# Patient Record
Sex: MALE | Race: WHITE | NOT HISPANIC OR LATINO | Employment: FULL TIME | URBAN - METROPOLITAN AREA
[De-identification: names, ages, dates, MRNs, and addresses within clinical notes are randomized per-mention and may not be internally consistent; named-entity substitution may affect disease eponyms.]

---

## 2017-07-03 ENCOUNTER — OFFICE VISIT (OUTPATIENT)
Dept: FAMILY MEDICINE CLINIC | Facility: CLINIC | Age: 52
End: 2017-07-03

## 2017-07-03 VITALS
SYSTOLIC BLOOD PRESSURE: 128 MMHG | HEIGHT: 67 IN | WEIGHT: 238 LBS | OXYGEN SATURATION: 96 % | DIASTOLIC BLOOD PRESSURE: 76 MMHG | BODY MASS INDEX: 37.35 KG/M2 | TEMPERATURE: 98.2 F | HEART RATE: 87 BPM

## 2017-07-03 DIAGNOSIS — S76.112A STRAIN OF LEFT QUADRICEPS, INITIAL ENCOUNTER: Primary | ICD-10-CM

## 2017-07-03 PROCEDURE — 99213 OFFICE O/P EST LOW 20 MIN: CPT | Performed by: FAMILY MEDICINE

## 2017-07-03 RX ORDER — PANTOPRAZOLE SODIUM 40 MG/1
TABLET, DELAYED RELEASE ORAL
COMMUNITY
Start: 2017-04-04 | End: 2017-11-03 | Stop reason: SDUPTHER

## 2017-07-03 RX ORDER — CYCLOBENZAPRINE HCL 10 MG
10 TABLET ORAL 3 TIMES DAILY PRN
Qty: 30 TABLET | Refills: 0 | Status: SHIPPED | OUTPATIENT
Start: 2017-07-03 | End: 2017-11-03 | Stop reason: SDUPTHER

## 2017-07-03 NOTE — PROGRESS NOTES
Subjective   Jay Saunders is a 52 y.o. male    HPI Comments: Patient presents today for assessment of a left quadriceps injury that occurred on 6/28/17 while playing softball.  He felt something pop in his left anterior mid thigh diving for a softball.  He had subsequent loss of range of motion and slight swelling but marked pain and tenderness that persisted.  He subsequently developed evidence of deep bruising with discoloration in the medial distal thigh region.  He has pain with stretching of the quadriceps complex but has maintained relatively good motion overall.      The following portions of the patient's history were reviewed and updated as appropriate: allergies, current medications, past social history and problem list    Review of Systems   Constitutional: Negative.    Respiratory: Negative for shortness of breath and wheezing.    Cardiovascular: Negative for chest pain and palpitations.   Musculoskeletal: Positive for myalgias. Negative for arthralgias.   Skin: Positive for color change. Negative for rash and wound.   Neurological: Negative.    Hematological: Negative for adenopathy. Does not bruise/bleed easily.       Objective     Vitals:    07/03/17 1119   BP: 128/76   Pulse: 87   Temp: 98.2 °F (36.8 °C)   SpO2: 96%       Physical Exam   Constitutional: He is oriented to person, place, and time. He appears well-developed and well-nourished.   HENT:   Head: Normocephalic and atraumatic.   Eyes: Conjunctivae are normal. Pupils are equal, round, and reactive to light.   Musculoskeletal: He exhibits tenderness. He exhibits no deformity.        Left upper leg: He exhibits tenderness and swelling. He exhibits no bony tenderness.   Tenderness over the mid quadriceps left leg with resolving ecchymosis medial distal thigh.   Neurological: He is alert and oriented to person, place, and time.   Nursing note and vitals reviewed.      Assessment/Plan   Problem List Items Addressed This Visit     None      Visit  Diagnoses     Strain of left quadriceps, initial encounter    -  Primary    Relevant Medications    cyclobenzaprine (FLEXERIL) 10 MG tablet        Continued stretching left quad.

## 2017-10-28 RX ORDER — DICLOFENAC SODIUM 75 MG/1
TABLET, DELAYED RELEASE ORAL
Qty: 180 TABLET | Refills: 3 | Status: CANCELLED | OUTPATIENT
Start: 2017-10-28

## 2017-10-28 RX ORDER — DILTIAZEM HYDROCHLORIDE 240 MG/1
CAPSULE, EXTENDED RELEASE ORAL
Qty: 90 CAPSULE | Refills: 3 | Status: CANCELLED | OUTPATIENT
Start: 2017-10-28

## 2017-10-28 RX ORDER — SIMVASTATIN 40 MG
TABLET ORAL
Qty: 90 TABLET | Refills: 3 | Status: CANCELLED | OUTPATIENT
Start: 2017-10-28

## 2017-10-28 RX ORDER — CITALOPRAM 20 MG/1
TABLET ORAL
Qty: 90 TABLET | Refills: 3 | Status: CANCELLED | OUTPATIENT
Start: 2017-10-28

## 2017-10-28 RX ORDER — LISINOPRIL AND HYDROCHLOROTHIAZIDE 20; 12.5 MG/1; MG/1
TABLET ORAL
Qty: 90 TABLET | Refills: 3 | Status: CANCELLED | OUTPATIENT
Start: 2017-10-28

## 2017-11-02 RX ORDER — CITALOPRAM 20 MG/1
TABLET ORAL
Qty: 90 TABLET | Refills: 3 | Status: CANCELLED | OUTPATIENT
Start: 2017-11-02

## 2017-11-03 ENCOUNTER — TELEPHONE (OUTPATIENT)
Dept: FAMILY MEDICINE CLINIC | Facility: CLINIC | Age: 52
End: 2017-11-03

## 2017-11-03 DIAGNOSIS — S76.112A STRAIN OF LEFT QUADRICEPS, INITIAL ENCOUNTER: ICD-10-CM

## 2017-11-03 RX ORDER — SIMVASTATIN 40 MG
40 TABLET ORAL DAILY
Qty: 90 TABLET | Refills: 3 | Status: SHIPPED | OUTPATIENT
Start: 2017-11-03 | End: 2018-11-06 | Stop reason: SDUPTHER

## 2017-11-03 RX ORDER — PANTOPRAZOLE SODIUM 40 MG/1
40 TABLET, DELAYED RELEASE ORAL DAILY
Qty: 30 TABLET | Refills: 1 | Status: SHIPPED | OUTPATIENT
Start: 2017-11-03 | End: 2018-11-26

## 2017-11-03 RX ORDER — DILTIAZEM HYDROCHLORIDE 240 MG/1
240 CAPSULE, EXTENDED RELEASE ORAL DAILY
Qty: 90 CAPSULE | Refills: 3 | Status: SHIPPED | OUTPATIENT
Start: 2017-11-03 | End: 2018-11-06 | Stop reason: SDUPTHER

## 2017-11-03 RX ORDER — CITALOPRAM 20 MG/1
20 TABLET ORAL DAILY
Qty: 90 TABLET | Refills: 3 | Status: SHIPPED | OUTPATIENT
Start: 2017-11-03 | End: 2018-11-06 | Stop reason: SDUPTHER

## 2017-11-03 RX ORDER — LISINOPRIL AND HYDROCHLOROTHIAZIDE 20; 12.5 MG/1; MG/1
1 TABLET ORAL DAILY
Qty: 90 TABLET | Refills: 3 | Status: SHIPPED | OUTPATIENT
Start: 2017-11-03 | End: 2018-11-06 | Stop reason: SDUPTHER

## 2017-11-03 RX ORDER — DICLOFENAC SODIUM 75 MG/1
75 TABLET, DELAYED RELEASE ORAL 2 TIMES DAILY
Qty: 180 TABLET | Refills: 3 | Status: SHIPPED | OUTPATIENT
Start: 2017-11-03 | End: 2018-11-06 | Stop reason: SDUPTHER

## 2017-11-03 RX ORDER — CYCLOBENZAPRINE HCL 10 MG
10 TABLET ORAL 3 TIMES DAILY PRN
Qty: 30 TABLET | Refills: 0 | Status: SHIPPED | OUTPATIENT
Start: 2017-11-03 | End: 2018-11-26

## 2017-11-03 NOTE — TELEPHONE ENCOUNTER
Patient called for refills of medication, spoke to Dr. ST  Verbally and he ok'd all refills, notified patient. Sent to [facility]. Patient has appt 11/22/2017

## 2017-11-03 NOTE — TELEPHONE ENCOUNTER
----- Message from Brisa Umana sent at 11/3/2017 10:32 AM EDT -----  Contact: RX  PLEASE CALL RX BACK, THEY NEED TO VERIFY THAT THE INTERACTION BETWEEN THE FOLLOWING MEDICATIONS ARE BEING MONITORED:    diltiaZEM (TAZTIA XT) 240 MG 24 hr capsule  simvastatin (ZOCOR) 40 MG tablet        Pharmacy     [address] - [phone]  - [phone] FX

## 2017-11-22 ENCOUNTER — OFFICE VISIT (OUTPATIENT)
Dept: FAMILY MEDICINE CLINIC | Facility: CLINIC | Age: 52
End: 2017-11-22

## 2017-11-22 VITALS
BODY MASS INDEX: 36.29 KG/M2 | OXYGEN SATURATION: 99 % | HEIGHT: 67 IN | DIASTOLIC BLOOD PRESSURE: 74 MMHG | SYSTOLIC BLOOD PRESSURE: 128 MMHG | WEIGHT: 231.2 LBS | HEART RATE: 67 BPM

## 2017-11-22 DIAGNOSIS — I10 ESSENTIAL HYPERTENSION: ICD-10-CM

## 2017-11-22 DIAGNOSIS — Z12.5 PROSTATE CANCER SCREENING: ICD-10-CM

## 2017-11-22 DIAGNOSIS — M15.9 PRIMARY OSTEOARTHRITIS INVOLVING MULTIPLE JOINTS: ICD-10-CM

## 2017-11-22 DIAGNOSIS — E78.49 OTHER HYPERLIPIDEMIA: ICD-10-CM

## 2017-11-22 DIAGNOSIS — Z00.00 ROUTINE GENERAL MEDICAL EXAMINATION AT A HEALTH CARE FACILITY: Primary | ICD-10-CM

## 2017-11-22 PROCEDURE — 93000 ELECTROCARDIOGRAM COMPLETE: CPT | Performed by: FAMILY MEDICINE

## 2017-11-22 PROCEDURE — 99396 PREV VISIT EST AGE 40-64: CPT | Performed by: FAMILY MEDICINE

## 2017-11-22 RX ORDER — DILTIAZEM HYDROCHLORIDE 240 MG/1
CAPSULE, COATED, EXTENDED RELEASE ORAL
COMMUNITY
Start: 2017-11-08 | End: 2018-11-26

## 2017-11-22 NOTE — PROGRESS NOTES
Subjective   Jay Saunders is a 52 y.o. male    HPI Comments: Patient presents today for annual physical examination and follow-up regarding hypertension and hyperlipidemia.  He is compliant with his medications.  He reports some difficulties with erectile dysfunction and thinks it may be due to his citalopram.  I've given him instructions on how to wean off his medication of citalopram slowly over the next month.  Hopefully this will help.  The patient is not fasting today but will get his labs done at the [facility] where he lives.  He has no acute complaints today and feels well generally.  He is frustrated with his weight but has started exercising regularly at a gym 2-3 days a week and has lost a few pounds already.  He is encouraged regarding this.    Hypertension   Pertinent negatives include no chest pain, headaches, palpitations or shortness of breath.   Hyperlipidemia   Pertinent negatives include no chest pain, myalgias or shortness of breath.       The following portions of the patient's history were reviewed and updated as appropriate: allergies, current medications, past social history and problem list    Review of Systems   Constitutional: Negative.  Negative for chills, fatigue, fever and unexpected weight change.   HENT: Negative.    Eyes: Negative.    Respiratory: Negative.  Negative for cough, chest tightness, shortness of breath and wheezing.    Cardiovascular: Negative.  Negative for chest pain, palpitations and leg swelling.   Gastrointestinal: Negative.  Negative for abdominal pain, nausea and vomiting.   Endocrine: Negative.  Negative for polydipsia, polyphagia and polyuria.   Genitourinary: Negative.  Negative for dysuria, frequency and urgency.   Musculoskeletal: Negative.  Negative for arthralgias, back pain and myalgias.   Skin: Negative.  Negative for color change and rash.   Allergic/Immunologic: Negative.    Neurological: Negative.  Negative for dizziness, syncope, weakness  and headaches.   Hematological: Negative.  Negative for adenopathy. Does not bruise/bleed easily.   Psychiatric/Behavioral: Negative.    All other systems reviewed and are negative.      Objective     Vitals:    11/22/17 1406   BP: 128/74   Pulse: 67   SpO2: 99%       Physical Exam   Constitutional: He is oriented to person, place, and time. He appears well-developed and well-nourished.   HENT:   Head: Normocephalic and atraumatic.   Right Ear: External ear normal.   Left Ear: External ear normal.   Nose: Nose normal.   Mouth/Throat: Oropharynx is clear and moist.   Eyes: Conjunctivae and EOM are normal. Pupils are equal, round, and reactive to light.   Neck: Normal range of motion. Neck supple. No JVD present. No thyromegaly present.   Cardiovascular: Normal rate, regular rhythm, normal heart sounds, intact distal pulses and normal pulses.    No murmur heard.  Pulmonary/Chest: Effort normal and breath sounds normal. No respiratory distress.   Abdominal: Soft. Bowel sounds are normal. He exhibits no mass. There is no hepatosplenomegaly. There is no tenderness.   Genitourinary: Rectum normal, prostate normal and penis normal.   Musculoskeletal: Normal range of motion. He exhibits no edema.   Lymphadenopathy:     He has no cervical adenopathy.   Neurological: He is alert and oriented to person, place, and time. He has normal reflexes. No cranial nerve deficit.   Skin: Skin is warm and dry. No rash noted.   Psychiatric: He has a normal mood and affect. His behavior is normal.   Nursing note and vitals reviewed.      ECG 12 Lead  Date/Time: 11/22/2017 4:50 PM  Performed by: RAMESH MEJIA  Authorized by: RAMESH MEJIA   Comparison: compared with previous ECG from 3/17/2015  Similar to previous ECG  Rhythm: sinus rhythm  Rate: normal  Conduction: conduction normal  ST Segments: ST segments normal  T Waves: T waves normal  QRS axis: normal  Other: no other findings  Clinical impression: normal  ECG  Comments: rSr' in V1 likely normal variant.          Assessment/Plan   Problem List Items Addressed This Visit        Cardiovascular and Mediastinum    Hyperlipidemia    Relevant Orders    Comprehensive Metabolic Panel    Lipid Panel    TSH    ECG 12 Lead      Other Visit Diagnoses     Routine general medical examination at a health care facility    -  Primary    Relevant Orders    CBC (No Diff)    Comprehensive Metabolic Panel    Essential hypertension        Relevant Medications    diltiaZEM CD (CARDIZEM CD) 240 MG 24 hr capsule    Other Relevant Orders    Comprehensive Metabolic Panel    TSH    ECG 12 Lead    Primary osteoarthritis involving multiple joints        Prostate cancer screening        Relevant Orders    PSA Screen        Patient is counseled during today's visit regarding preventative health measures to include use of seatbelts, medication safety, healthy diet and regular exercise.

## 2018-11-06 RX ORDER — LISINOPRIL AND HYDROCHLOROTHIAZIDE 20; 12.5 MG/1; MG/1
TABLET ORAL
Qty: 90 TABLET | Refills: 0 | Status: SHIPPED | OUTPATIENT
Start: 2018-11-06 | End: 2018-11-26

## 2018-11-06 RX ORDER — SIMVASTATIN 40 MG
TABLET ORAL
Qty: 90 TABLET | Refills: 0 | Status: SHIPPED | OUTPATIENT
Start: 2018-11-06 | End: 2018-11-26

## 2018-11-06 RX ORDER — DICLOFENAC SODIUM 75 MG/1
TABLET, DELAYED RELEASE ORAL
Qty: 180 TABLET | Refills: 0 | Status: SHIPPED | OUTPATIENT
Start: 2018-11-06 | End: 2018-11-26

## 2018-11-06 RX ORDER — DILTIAZEM HYDROCHLORIDE 240 MG/1
CAPSULE, EXTENDED RELEASE ORAL
Qty: 90 CAPSULE | Refills: 0 | Status: SHIPPED | OUTPATIENT
Start: 2018-11-06 | End: 2018-11-26

## 2018-11-06 RX ORDER — CITALOPRAM 20 MG/1
TABLET ORAL
Qty: 90 TABLET | Refills: 0 | Status: SHIPPED | OUTPATIENT
Start: 2018-11-06 | End: 2018-11-26

## 2018-11-26 ENCOUNTER — OFFICE VISIT (OUTPATIENT)
Dept: FAMILY MEDICINE CLINIC | Facility: CLINIC | Age: 53
End: 2018-11-26

## 2018-11-26 VITALS
DIASTOLIC BLOOD PRESSURE: 78 MMHG | HEART RATE: 60 BPM | OXYGEN SATURATION: 98 % | TEMPERATURE: 98.6 F | BODY MASS INDEX: 34.07 KG/M2 | WEIGHT: 238 LBS | SYSTOLIC BLOOD PRESSURE: 130 MMHG | HEIGHT: 70 IN

## 2018-11-26 DIAGNOSIS — Z00.00 ROUTINE GENERAL MEDICAL EXAMINATION AT A HEALTH CARE FACILITY: Primary | ICD-10-CM

## 2018-11-26 DIAGNOSIS — M25.532 LEFT WRIST PAIN: ICD-10-CM

## 2018-11-26 DIAGNOSIS — I10 ESSENTIAL HYPERTENSION: ICD-10-CM

## 2018-11-26 DIAGNOSIS — S76.112A STRAIN OF LEFT QUADRICEPS, INITIAL ENCOUNTER: ICD-10-CM

## 2018-11-26 DIAGNOSIS — E78.49 OTHER HYPERLIPIDEMIA: ICD-10-CM

## 2018-11-26 DIAGNOSIS — Z12.5 PROSTATE CANCER SCREENING: ICD-10-CM

## 2018-11-26 DIAGNOSIS — M15.9 PRIMARY OSTEOARTHRITIS INVOLVING MULTIPLE JOINTS: ICD-10-CM

## 2018-11-26 DIAGNOSIS — R53.83 FATIGUE, UNSPECIFIED TYPE: ICD-10-CM

## 2018-11-26 PROCEDURE — 99396 PREV VISIT EST AGE 40-64: CPT | Performed by: FAMILY MEDICINE

## 2018-11-26 RX ORDER — LISINOPRIL AND HYDROCHLOROTHIAZIDE 20; 12.5 MG/1; MG/1
1 TABLET ORAL DAILY
Qty: 90 TABLET | Refills: 3 | Status: SHIPPED | OUTPATIENT
Start: 2018-11-26 | End: 2020-01-08 | Stop reason: SDUPTHER

## 2018-11-26 RX ORDER — DICLOFENAC SODIUM 75 MG/1
75 TABLET, DELAYED RELEASE ORAL 2 TIMES DAILY
Qty: 180 TABLET | Refills: 3 | Status: SHIPPED | OUTPATIENT
Start: 2018-11-26 | End: 2020-01-08 | Stop reason: SDUPTHER

## 2018-11-26 RX ORDER — DILTIAZEM HYDROCHLORIDE 240 MG/1
240 CAPSULE, COATED, EXTENDED RELEASE ORAL DAILY
Qty: 90 CAPSULE | Refills: 3 | Status: SHIPPED | OUTPATIENT
Start: 2018-11-26 | End: 2020-01-08 | Stop reason: SDUPTHER

## 2018-11-26 RX ORDER — PANTOPRAZOLE SODIUM 40 MG/1
40 TABLET, DELAYED RELEASE ORAL DAILY
Qty: 90 TABLET | Refills: 3 | Status: SHIPPED | OUTPATIENT
Start: 2018-11-26 | End: 2020-01-08 | Stop reason: SDUPTHER

## 2018-11-26 RX ORDER — CITALOPRAM 20 MG/1
20 TABLET ORAL DAILY
Qty: 90 TABLET | Refills: 3 | Status: SHIPPED | OUTPATIENT
Start: 2018-11-26 | End: 2020-01-08 | Stop reason: SDUPTHER

## 2018-11-26 RX ORDER — CYCLOBENZAPRINE HCL 10 MG
10 TABLET ORAL 3 TIMES DAILY PRN
Qty: 30 TABLET | Refills: 5 | Status: SHIPPED | OUTPATIENT
Start: 2018-11-26 | End: 2020-01-08 | Stop reason: SDUPTHER

## 2018-11-26 RX ORDER — SIMVASTATIN 40 MG
40 TABLET ORAL DAILY
Qty: 90 TABLET | Refills: 3 | Status: SHIPPED | OUTPATIENT
Start: 2018-11-26 | End: 2020-01-08 | Stop reason: SDUPTHER

## 2018-11-26 NOTE — PROGRESS NOTES
Subjective   Jay Saunders is a 53 y.o. male    Chief Complaint    Annual physical exam  High cholesterol  High blood pressure  GERD  Left wrist pain      History of Present Illness   Patient presents today for annual physical examination.  He is also here to follow-up regarding multiple chronic medical problems including hypertension, hyperlipidemia, GERD, degenerative arthritis with chronic back pain.  He is complaining of left wrist pain that has been present for some time but seems to have gotten worse of late.  He thinks he may have had a remote injury involving his wrist but had not had pain for many years.  He reports pain with hyperextension or hyperflexion.  It is limiting his activities as he is remodeling a home.  Patient requests written order for his lab studies to be done at [facility].  He is due for refills on all of his medications.      The following portions of the patient's history were reviewed and updated as appropriate: allergies, current medications, past social history and problem list    Review of Systems   Constitutional: Negative.  Negative for appetite change, chills, fatigue, fever and unexpected weight change.   HENT: Negative.    Eyes: Negative.    Respiratory: Negative.  Negative for cough, chest tightness, shortness of breath and wheezing.    Cardiovascular: Negative.  Negative for chest pain, palpitations and leg swelling.   Gastrointestinal: Negative.  Negative for abdominal distention, abdominal pain, diarrhea, nausea and vomiting.        Patient experiencing heartburn/acid reflux     Endocrine: Negative.  Negative for polydipsia, polyphagia and polyuria.   Genitourinary: Negative.  Negative for dysuria, frequency and urgency.   Musculoskeletal: Negative.  Negative for arthralgias, back pain and myalgias.   Skin: Negative.  Negative for color change and rash.   Allergic/Immunologic: Negative.    Neurological: Negative.  Negative for dizziness, syncope, weakness and  headaches.   Hematological: Negative.  Negative for adenopathy. Does not bruise/bleed easily.   Psychiatric/Behavioral: Negative.    All other systems reviewed and are negative.      Objective     Vitals:    11/26/18 1407   BP: 130/78   Pulse: 60   Temp: 98.6 °F (37 °C)   SpO2: 98%       Physical Exam   Constitutional: He is oriented to person, place, and time. He appears well-developed and well-nourished.   HENT:   Head: Normocephalic and atraumatic.   Right Ear: External ear normal.   Left Ear: External ear normal.   Nose: Nose normal.   Mouth/Throat: Oropharynx is clear and moist.   Eyes: Conjunctivae and EOM are normal. Pupils are equal, round, and reactive to light.   Neck: Normal range of motion. Neck supple. No JVD present. No thyromegaly present.   Cardiovascular: Normal rate, regular rhythm, normal heart sounds, intact distal pulses and normal pulses.   No murmur heard.  Pulmonary/Chest: Effort normal and breath sounds normal. No respiratory distress.   Abdominal: Soft. Bowel sounds are normal. He exhibits no mass. There is no hepatosplenomegaly. There is no tenderness.   Genitourinary: Rectum normal, prostate normal and penis normal.   Musculoskeletal: Normal range of motion. He exhibits no edema.   Lymphadenopathy:     He has no cervical adenopathy.   Neurological: He is alert and oriented to person, place, and time. He has normal reflexes. No cranial nerve deficit.   Skin: Skin is warm and dry. No rash noted.   Psychiatric: He has a normal mood and affect. His behavior is normal.   Nursing note and vitals reviewed.      Assessment/Plan   Problem List Items Addressed This Visit        Cardiovascular and Mediastinum    Hyperlipidemia    Relevant Medications    simvastatin (ZOCOR) 40 MG tablet    Other Relevant Orders    Comprehensive Metabolic Panel    Lipid Panel      Other Visit Diagnoses     Routine general medical examination at a health care facility    -  Primary    Relevant Orders    CBC (No Diff)     Comprehensive Metabolic Panel    Lipid Panel    Prostate cancer screening        Relevant Orders    PSA Screen    Primary osteoarthritis involving multiple joints        Essential hypertension        Relevant Medications    diltiaZEM CD (CARTIA XT) 240 MG 24 hr capsule    lisinopril-hydrochlorothiazide (PRINZIDE,ZESTORETIC) 20-12.5 MG per tablet    Other Relevant Orders    CBC (No Diff)    Comprehensive Metabolic Panel    Lipid Panel    TSH    T4, Free    Fatigue, unspecified type        Relevant Orders    Comprehensive Metabolic Panel    TSH    T4, Free    Testosterone, Free, Total    Left wrist pain        Relevant Orders    Ambulatory Referral to Hand Surgery    Strain of left quadriceps, initial encounter        Relevant Medications    cyclobenzaprine (FLEXERIL) 10 MG tablet        Patient is counseled during today's visit regarding preventative health measures to include use of seatbelts, medication safety, healthy diet and regular exercise.

## 2019-01-18 ENCOUNTER — TELEPHONE (OUTPATIENT)
Dept: FAMILY MEDICINE CLINIC | Facility: CLINIC | Age: 54
End: 2019-01-18

## 2019-01-18 NOTE — TELEPHONE ENCOUNTER
He had them drawn at [facility].  Everything looks okay but I never did receive his PSA or TSH levels.  Okay to mail copies of what we've got

## 2019-01-18 NOTE — TELEPHONE ENCOUNTER
----- Message from Brisa Umana sent at 1/18/2019 10:25 AM EST -----  Contact: PATIENT  HE WOULD LIKE A COPY OF HIS LABS MAILED TO HIM, SAID ITS BEEN SEVERAL MONTHS AND HE HAS NOT GOTTEN ANYTHING FROM  THE OFFICE. THANK YOU

## 2020-01-08 ENCOUNTER — OFFICE VISIT (OUTPATIENT)
Dept: FAMILY MEDICINE CLINIC | Facility: CLINIC | Age: 55
End: 2020-01-08

## 2020-01-08 VITALS
WEIGHT: 247 LBS | HEART RATE: 69 BPM | DIASTOLIC BLOOD PRESSURE: 84 MMHG | HEIGHT: 70 IN | OXYGEN SATURATION: 98 % | TEMPERATURE: 97.6 F | SYSTOLIC BLOOD PRESSURE: 140 MMHG | BODY MASS INDEX: 35.36 KG/M2

## 2020-01-08 DIAGNOSIS — E78.49 OTHER HYPERLIPIDEMIA: ICD-10-CM

## 2020-01-08 DIAGNOSIS — F41.9 ANXIETY: ICD-10-CM

## 2020-01-08 DIAGNOSIS — I10 ESSENTIAL HYPERTENSION: ICD-10-CM

## 2020-01-08 DIAGNOSIS — Z00.00 ROUTINE GENERAL MEDICAL EXAMINATION AT A HEALTH CARE FACILITY: Primary | ICD-10-CM

## 2020-01-08 DIAGNOSIS — Z12.5 PROSTATE CANCER SCREENING: ICD-10-CM

## 2020-01-08 DIAGNOSIS — K21.9 GASTROESOPHAGEAL REFLUX DISEASE, ESOPHAGITIS PRESENCE NOT SPECIFIED: ICD-10-CM

## 2020-01-08 PROCEDURE — 99396 PREV VISIT EST AGE 40-64: CPT | Performed by: FAMILY MEDICINE

## 2020-01-08 RX ORDER — SIMVASTATIN 40 MG
40 TABLET ORAL DAILY
Qty: 90 TABLET | Refills: 3 | Status: SHIPPED | OUTPATIENT
Start: 2020-01-08 | End: 2021-02-11

## 2020-01-08 RX ORDER — PANTOPRAZOLE SODIUM 40 MG/1
40 TABLET, DELAYED RELEASE ORAL DAILY
Qty: 90 TABLET | Refills: 3 | Status: SHIPPED | OUTPATIENT
Start: 2020-01-08 | End: 2021-02-11

## 2020-01-08 RX ORDER — DILTIAZEM HYDROCHLORIDE 240 MG/1
240 CAPSULE, COATED, EXTENDED RELEASE ORAL DAILY
Qty: 90 CAPSULE | Refills: 3 | Status: SHIPPED | OUTPATIENT
Start: 2020-01-08 | End: 2021-02-11

## 2020-01-08 RX ORDER — DICLOFENAC SODIUM 75 MG/1
75 TABLET, DELAYED RELEASE ORAL 2 TIMES DAILY
Qty: 180 TABLET | Refills: 3 | Status: SHIPPED | OUTPATIENT
Start: 2020-01-08 | End: 2021-02-11

## 2020-01-08 RX ORDER — LISINOPRIL AND HYDROCHLOROTHIAZIDE 20; 12.5 MG/1; MG/1
1 TABLET ORAL DAILY
Qty: 90 TABLET | Refills: 3 | Status: SHIPPED | OUTPATIENT
Start: 2020-01-08 | End: 2021-02-11

## 2020-01-08 RX ORDER — CYCLOBENZAPRINE HCL 10 MG
10 TABLET ORAL 3 TIMES DAILY PRN
Qty: 30 TABLET | Refills: 5 | Status: SHIPPED | OUTPATIENT
Start: 2020-01-08 | End: 2020-07-08

## 2020-01-08 RX ORDER — CITALOPRAM 20 MG/1
20 TABLET ORAL DAILY
Qty: 90 TABLET | Refills: 3 | Status: SHIPPED | OUTPATIENT
Start: 2020-01-08 | End: 2021-02-11

## 2020-01-08 NOTE — PROGRESS NOTES
Subjective   Jay Saunders is a 54 y.o. male    Chief Complaint    Annual physical exam  Prostate cancer screening  High blood pressure  High cholesterol  Anxiety    History of Present Illness  Patient presents today for annual physical examination.  He is due for annual lab studies as well as renewal of his prescriptions.  Since I last saw him he had surgery on his wrist by Dr. Olson.  He reports this is been slow to heal but he has improved.  Health maintenance issues are reviewed and addressed with the patient.    The following portions of the patient's history were reviewed and updated as appropriate: allergies, current medications, past social history and problem list    Review of Systems   Constitutional: Negative.  Negative for chills, fatigue, fever and unexpected weight change.   HENT: Negative.    Eyes: Negative.    Respiratory: Negative.  Negative for cough, chest tightness, shortness of breath and wheezing.    Cardiovascular: Negative.  Negative for chest pain, palpitations and leg swelling.   Gastrointestinal: Negative.  Negative for abdominal pain, nausea and vomiting.   Endocrine: Negative.  Negative for polydipsia, polyphagia and polyuria.   Genitourinary: Negative.  Negative for dysuria, frequency and urgency.   Musculoskeletal: Negative.  Negative for arthralgias, back pain and myalgias.   Skin: Negative.  Negative for color change and rash.   Allergic/Immunologic: Negative.    Neurological: Negative.  Negative for dizziness, syncope, weakness and headaches.   Hematological: Negative.  Negative for adenopathy. Does not bruise/bleed easily.   Psychiatric/Behavioral: Negative.    All other systems reviewed and are negative.      Objective     Vitals:    01/08/20 1407   BP: 140/84   Pulse: 69   Temp: 97.6 °F (36.4 °C)   SpO2: 98%       Physical Exam   Constitutional: He is oriented to person, place, and time. He appears well-developed and well-nourished.   HENT:   Head: Normocephalic and  atraumatic.   Right Ear: External ear normal.   Left Ear: External ear normal.   Nose: Nose normal.   Mouth/Throat: Oropharynx is clear and moist.   Eyes: Pupils are equal, round, and reactive to light. Conjunctivae and EOM are normal.   Neck: Normal range of motion. Neck supple. No JVD present. No thyromegaly present.   Cardiovascular: Normal rate, regular rhythm, normal heart sounds, intact distal pulses and normal pulses.   No murmur heard.  Pulmonary/Chest: Effort normal and breath sounds normal. No respiratory distress.   Abdominal: Soft. Bowel sounds are normal. He exhibits no mass. There is no hepatosplenomegaly. There is no tenderness.   Genitourinary: Rectum normal, prostate normal and penis normal.   Musculoskeletal: Normal range of motion. He exhibits no edema.   Lymphadenopathy:     He has no cervical adenopathy.   Neurological: He is alert and oriented to person, place, and time. He has normal reflexes. No cranial nerve deficit.   Skin: Skin is warm and dry. No rash noted.   Psychiatric: He has a normal mood and affect. His behavior is normal.   Nursing note and vitals reviewed.      Assessment/Plan   Problem List Items Addressed This Visit        Cardiovascular and Mediastinum    Hyperlipidemia    Relevant Medications    simvastatin (ZOCOR) 40 MG tablet    Other Relevant Orders    Comprehensive Metabolic Panel    Lipid Panel      Other Visit Diagnoses     Routine general medical examination at a health care facility    -  Primary    Relevant Orders    CBC (No Diff)    Comprehensive Metabolic Panel    Lipid Panel    PSA Screen    TSH    T4, Free    Prostate cancer screening        Relevant Orders    PSA Screen    Essential hypertension        Relevant Medications    dilTIAZem CD (CARTIA XT) 240 MG 24 hr capsule    lisinopril-hydrochlorothiazide (PRINZIDE,ZESTORETIC) 20-12.5 MG per tablet    Other Relevant Orders    CBC (No Diff)    Comprehensive Metabolic Panel    TSH    T4, Free    Strain of left  quadriceps, initial encounter        Relevant Medications    cyclobenzaprine (FLEXERIL) 10 MG tablet        Patient is counseled during today's visit regarding preventative health measures to include use of seatbelts, medication safety, healthy diet and regular exercise.

## 2020-07-08 ENCOUNTER — OFFICE VISIT (OUTPATIENT)
Dept: FAMILY MEDICINE CLINIC | Facility: CLINIC | Age: 55
End: 2020-07-08

## 2020-07-08 VITALS
DIASTOLIC BLOOD PRESSURE: 82 MMHG | WEIGHT: 245 LBS | TEMPERATURE: 97.7 F | SYSTOLIC BLOOD PRESSURE: 124 MMHG | HEIGHT: 70 IN | BODY MASS INDEX: 35.07 KG/M2 | OXYGEN SATURATION: 99 % | HEART RATE: 78 BPM

## 2020-07-08 DIAGNOSIS — M54.42 ACUTE MIDLINE LOW BACK PAIN WITH BILATERAL SCIATICA: Primary | ICD-10-CM

## 2020-07-08 DIAGNOSIS — M54.41 ACUTE MIDLINE LOW BACK PAIN WITH BILATERAL SCIATICA: Primary | ICD-10-CM

## 2020-07-08 DIAGNOSIS — M51.36 DEGENERATIVE DISC DISEASE, LUMBAR: ICD-10-CM

## 2020-07-08 PROCEDURE — 99213 OFFICE O/P EST LOW 20 MIN: CPT | Performed by: PHYSICIAN ASSISTANT

## 2020-07-08 RX ORDER — TIZANIDINE 2 MG/1
2 TABLET ORAL EVERY 8 HOURS PRN
Qty: 30 TABLET | Refills: 1 | Status: SHIPPED | OUTPATIENT
Start: 2020-07-08

## 2020-07-08 RX ORDER — METHYLPREDNISOLONE 4 MG/1
TABLET ORAL
Qty: 1 EACH | Refills: 0 | Status: SHIPPED | OUTPATIENT
Start: 2020-07-08 | End: 2020-10-20 | Stop reason: SDUPTHER

## 2020-07-08 NOTE — PROGRESS NOTES
Subjective   Jay Saunders is a 55 y.o. male  Back Pain (increased lower back pain x2 weeks, difficulty ambulating )      History of Present Illness  Patient is a pleasant 55-year-old white male who presents for evaluation treatment of severe, unrelenting, constant back pain he has been experiencing for the past 5 days.  He has a history of degenerative disc disease status post spinal surgery 2015 with Dr. Owens.  States he is done really well for the past 5 years and rarely has any flareups of his back problem until this past weekend.  He states he was bending over pouring some concrete and felt a sharp pain in his low back.  States is progressively worsened and is now 10 out of 10 and he has not been able get out of the bed for the past 3 days to do daily activities.  He states that it is radiating down both of his legs.  He is sitting down the pain radiates to his knees if he is walking radiates to his feet.  He states he had to have his wife assist him to dress himself, shower and he has difficulty walking.  He complains of pain numbness burning tingling in low back down both legs.  No loss of urinary or bowel control.  He is currently taking diclofenac and Flexeril.  The following portions of the patient's history were reviewed and updated as appropriate: allergies, current medications, past social history and problem list    Review of Systems   Respiratory: Negative.    Gastrointestinal: Negative.    Genitourinary: Negative.    Musculoskeletal: Positive for back pain and myalgias. Negative for arthralgias and gait problem.   Skin: Negative.    Neurological: Positive for weakness and numbness. Negative for dizziness and tremors.   Psychiatric/Behavioral: Positive for sleep disturbance.       Objective     Vitals:    07/08/20 1601   BP: 124/82   Pulse: 78   Temp: 97.7 °F (36.5 °C)   SpO2: 99%       Physical Exam   Constitutional: He is oriented to person, place, and time. He appears well-developed and  well-nourished. He does not have a sickly appearance. He does not appear ill. He appears distressed.   Cardiovascular: Normal rate, regular rhythm and intact distal pulses.   Pulmonary/Chest: Effort normal and breath sounds normal. No respiratory distress.   Musculoskeletal: He exhibits tenderness.        Lumbar back: He exhibits decreased range of motion, tenderness, bony tenderness and pain. He exhibits no swelling, no deformity and no spasm.   Gait is very slow, struggles to stand up after being seated, walks with lumbar spine in flexion and with very slow movements   Neurological: He is alert and oriented to person, place, and time. He displays abnormal reflex. A sensory deficit is present. No cranial nerve deficit. He exhibits normal muscle tone. Coordination normal.   Skin: No rash noted. He is not diaphoretic. No erythema. No pallor.   Psychiatric: He has a normal mood and affect. His behavior is normal. Thought content normal.   Nursing note and vitals reviewed.    I reviewed patient's MRI to start from 2015 showing degenerative changes  Assessment/Plan     Jay was seen today for back pain.    Diagnoses and all orders for this visit:    Acute midline low back pain with bilateral sciatica    Degenerative disc disease, lumbar    Other orders  -     methylPREDNISolone (MEDROL, LORIE,) 4 MG tablet; Take as directed on package instructions.  -     tiZANidine (ZANAFLEX) 2 MG tablet; Take 1 tablet by mouth Every 8 (Eight) Hours As Needed for Muscle Spasms.    Continuing diclofenac.  Prescription given for gabapentin 3 mg 1 twice daily for 10 days #20 and Norco 10/325 1 twice daily #20 with no refills.  Discussed abuse potential, controlled substance status of these medications and intention of this being for short-term 10-day usage only.  If symptoms are persistent, unimproved after the weekend patient will call at that time would recommend MRI of lumbar spine for further evaluation due to previous surgery and  symptoms of severe radiculopathy

## 2020-07-13 ENCOUNTER — TELEPHONE (OUTPATIENT)
Dept: FAMILY MEDICINE CLINIC | Facility: CLINIC | Age: 55
End: 2020-07-13

## 2020-07-13 DIAGNOSIS — M51.36 DEGENERATIVE DISC DISEASE, LUMBAR: Primary | ICD-10-CM

## 2020-07-13 DIAGNOSIS — M54.41 ACUTE MIDLINE LOW BACK PAIN WITH BILATERAL SCIATICA: ICD-10-CM

## 2020-07-13 DIAGNOSIS — M54.42 ACUTE MIDLINE LOW BACK PAIN WITH BILATERAL SCIATICA: ICD-10-CM

## 2020-07-13 NOTE — TELEPHONE ENCOUNTER
PT STATES THAT HE IS STILL HAVING ISSUES WITH HIS BACK.    PT IS REQUESTING AN ORDER FOR AN MRI.      PLEASE ADVISE

## 2020-07-23 ENCOUNTER — HOSPITAL ENCOUNTER (OUTPATIENT)
Dept: MRI IMAGING | Facility: HOSPITAL | Age: 55
Discharge: HOME | End: 2020-07-23
Admitting: FAMILY MEDICINE

## 2020-07-23 DIAGNOSIS — M51.36 DEGENERATIVE DISC DISEASE, LUMBAR: ICD-10-CM

## 2020-07-23 DIAGNOSIS — M54.42 ACUTE MIDLINE LOW BACK PAIN WITH BILATERAL SCIATICA: ICD-10-CM

## 2020-07-23 DIAGNOSIS — M54.41 ACUTE MIDLINE LOW BACK PAIN WITH BILATERAL SCIATICA: ICD-10-CM

## 2020-07-23 PROCEDURE — 72148 MRI LUMBAR SPINE W/O DYE: CPT

## 2020-10-19 ENCOUNTER — TELEPHONE (OUTPATIENT)
Dept: FAMILY MEDICINE CLINIC | Facility: CLINIC | Age: 55
End: 2020-10-19

## 2020-10-19 NOTE — TELEPHONE ENCOUNTER
Caller: Jay Saunders    Relationship: Self    Best call back number: [phone]    What medication are you requesting: STERIODS     What are your current symptoms: BACK PAIN    Have you had these symptoms before:    [x] Yes  [] No    Have you been treated for these symptoms before:   [x] Yes  [] No    If a prescription is needed, what is your preferred pharmacy and phone number: [facility] [address] [place], [place] [address] [facility] [phone] [facility] [phone]      Additional notes:  PATIENT STATES HE WAS GIVEN THIS MEDICINE BY [facility] BUT IS CURRENTLY OUT.

## 2020-10-20 RX ORDER — METHYLPREDNISOLONE 4 MG/1
TABLET ORAL
Qty: 1 EACH | Refills: 0 | Status: SHIPPED | OUTPATIENT
Start: 2020-10-20 | End: 2021-03-31

## 2021-02-08 DIAGNOSIS — K21.9 GASTROESOPHAGEAL REFLUX DISEASE: ICD-10-CM

## 2021-02-08 DIAGNOSIS — I10 ESSENTIAL HYPERTENSION: ICD-10-CM

## 2021-02-08 DIAGNOSIS — F41.9 ANXIETY: ICD-10-CM

## 2021-02-08 DIAGNOSIS — E78.49 OTHER HYPERLIPIDEMIA: ICD-10-CM

## 2021-02-10 NOTE — TELEPHONE ENCOUNTER
Caller: Jay Saunders    Relationship: Self    Best call back number: [phone]    Medication needed:   Requested Prescriptions     Pending Prescriptions Disp Refills   • pantoprazole (PROTONIX) 40 MG EC tablet [Pharmacy Med Name: Pantoprazole Sodium 40 MG Oral Tablet Delayed Release] 90 tablet 0     Sig: Take 1 tablet by mouth once daily   • diclofenac (VOLTAREN) 75 MG EC tablet [Pharmacy Med Name: Diclofenac Sodium 75 MG Oral Tablet Delayed Release] 180 tablet 0     Sig: Take 1 tablet by mouth twice daily   • citalopram (CeleXA) 20 MG tablet [Pharmacy Med Name: Citalopram Hydrobromide 20 MG Oral Tablet] 90 tablet 0     Sig: Take 1 tablet by mouth once daily   • simvastatin (ZOCOR) 40 MG tablet [Pharmacy Med Name: Simvastatin 40 MG Oral Tablet] 90 tablet 0     Sig: Take 1 tablet by mouth once daily   • lisinopril-hydrochlorothiazide (PRINZIDE,ZESTORETIC) 20-12.5 MG per tablet [Pharmacy Med Name: Lisinopril-hydroCHLOROthiazide 20-12.5 MG Oral Tablet] 90 tablet 0     Sig: Take 1 tablet by mouth once daily   • Cartia  MG 24 hr capsule [Pharmacy Med Name: Cartia  MG Oral Capsule Extended Release 24 Hour] 90 capsule 0     Sig: Take 1 capsule by mouth once daily       When do you need the refill by: ASAP    What details did the patient provide when requesting the medication: PATIENT IS OUT OF HIS MEDICATION AS OF TODAY    Does the patient have less than a 3 day supply:  [x] Yes  [] No    What is the patient's preferred pharmacy: [address] [phone] [facility] [phone]

## 2021-02-11 RX ORDER — DILTIAZEM HYDROCHLORIDE 240 MG/1
CAPSULE, EXTENDED RELEASE ORAL
Qty: 90 CAPSULE | Refills: 0 | Status: SHIPPED | OUTPATIENT
Start: 2021-02-11 | End: 2021-05-04

## 2021-02-11 RX ORDER — DICLOFENAC SODIUM 75 MG/1
TABLET, DELAYED RELEASE ORAL
Qty: 180 TABLET | Refills: 0 | Status: SHIPPED | OUTPATIENT
Start: 2021-02-11 | End: 2021-05-04

## 2021-02-11 RX ORDER — CITALOPRAM 20 MG/1
TABLET ORAL
Qty: 90 TABLET | Refills: 0 | Status: SHIPPED | OUTPATIENT
Start: 2021-02-11 | End: 2021-05-04

## 2021-02-11 RX ORDER — PANTOPRAZOLE SODIUM 40 MG/1
TABLET, DELAYED RELEASE ORAL
Qty: 90 TABLET | Refills: 0 | Status: SHIPPED | OUTPATIENT
Start: 2021-02-11 | End: 2021-05-04

## 2021-02-11 RX ORDER — LISINOPRIL AND HYDROCHLOROTHIAZIDE 20; 12.5 MG/1; MG/1
TABLET ORAL
Qty: 90 TABLET | Refills: 0 | Status: SHIPPED | OUTPATIENT
Start: 2021-02-11 | End: 2021-05-04

## 2021-02-11 RX ORDER — SIMVASTATIN 40 MG
TABLET ORAL
Qty: 90 TABLET | Refills: 0 | Status: SHIPPED | OUTPATIENT
Start: 2021-02-11 | End: 2021-05-04

## 2021-03-31 ENCOUNTER — HOSPITAL ENCOUNTER (OUTPATIENT)
Dept: GENERAL RADIOLOGY | Facility: HOSPITAL | Age: 56
Discharge: HOME | End: 2021-03-31

## 2021-03-31 ENCOUNTER — LAB (OUTPATIENT)
Dept: LAB | Facility: HOSPITAL | Age: 56
End: 2021-03-31

## 2021-03-31 ENCOUNTER — OFFICE VISIT (OUTPATIENT)
Dept: FAMILY MEDICINE CLINIC | Facility: CLINIC | Age: 56
End: 2021-03-31

## 2021-03-31 VITALS
HEIGHT: 70 IN | HEART RATE: 68 BPM | WEIGHT: 240.6 LBS | DIASTOLIC BLOOD PRESSURE: 78 MMHG | RESPIRATION RATE: 18 BRPM | BODY MASS INDEX: 34.44 KG/M2 | OXYGEN SATURATION: 96 % | SYSTOLIC BLOOD PRESSURE: 126 MMHG

## 2021-03-31 DIAGNOSIS — E78.49 OTHER HYPERLIPIDEMIA: ICD-10-CM

## 2021-03-31 DIAGNOSIS — Z00.00 ROUTINE GENERAL MEDICAL EXAMINATION AT A HEALTH CARE FACILITY: ICD-10-CM

## 2021-03-31 DIAGNOSIS — I10 ESSENTIAL HYPERTENSION: ICD-10-CM

## 2021-03-31 DIAGNOSIS — R22.31 SUBCUTANEOUS NODULE OF RIGHT HAND: ICD-10-CM

## 2021-03-31 DIAGNOSIS — F41.9 ANXIETY: ICD-10-CM

## 2021-03-31 DIAGNOSIS — Z12.5 PROSTATE CANCER SCREENING: ICD-10-CM

## 2021-03-31 DIAGNOSIS — Z00.00 ROUTINE GENERAL MEDICAL EXAMINATION AT A HEALTH CARE FACILITY: Primary | ICD-10-CM

## 2021-03-31 LAB
ALBUMIN SERPL-MCNC: 4.5 G/DL (ref 3.5–5.2)
ALBUMIN/GLOB SERPL: 2.3 G/DL
ALP SERPL-CCNC: 53 U/L (ref 39–117)
ALT SERPL W P-5'-P-CCNC: 42 U/L (ref 1–41)
ANION GAP SERPL CALCULATED.3IONS-SCNC: 10.5 MMOL/L (ref 5–15)
AST SERPL-CCNC: 25 U/L (ref 1–40)
BILIRUB SERPL-MCNC: 0.5 MG/DL (ref 0–1.2)
BUN SERPL-MCNC: 16 MG/DL (ref 6–20)
BUN/CREAT SERPL: 18.2 (ref 7–25)
CALCIUM SPEC-SCNC: 9.2 MG/DL (ref 8.6–10.5)
CHLORIDE SERPL-SCNC: 103 MMOL/L (ref 98–107)
CHOLEST SERPL-MCNC: 155 MG/DL (ref 0–200)
CO2 SERPL-SCNC: 29.5 MMOL/L (ref 22–29)
CREAT SERPL-MCNC: 0.88 MG/DL (ref 0.76–1.27)
DEPRECATED RDW RBC AUTO: 42.7 FL (ref 37–54)
ERYTHROCYTE [DISTWIDTH] IN BLOOD BY AUTOMATED COUNT: 12.8 % (ref 12.3–15.4)
GFR SERPL CREATININE-BSD FRML MDRD: 91 ML/MIN/1.73
GLOBULIN UR ELPH-MCNC: 2 GM/DL
GLUCOSE SERPL-MCNC: 73 MG/DL (ref 65–99)
HCT VFR BLD AUTO: 48.5 % (ref 37.5–51)
HDLC SERPL-MCNC: 45 MG/DL (ref 40–60)
HGB BLD-MCNC: 16.5 G/DL (ref 13–17.7)
LDLC SERPL CALC-MCNC: 74 MG/DL (ref 0–100)
LDLC/HDLC SERPL: 1.49 {RATIO}
MCH RBC QN AUTO: 31 PG (ref 26.6–33)
MCHC RBC AUTO-ENTMCNC: 34 G/DL (ref 31.5–35.7)
MCV RBC AUTO: 91 FL (ref 79–97)
PLATELET # BLD AUTO: 230 10*3/MM3 (ref 140–450)
PMV BLD AUTO: 9.4 FL (ref 6–12)
POTASSIUM SERPL-SCNC: 4 MMOL/L (ref 3.5–5.2)
PROT SERPL-MCNC: 6.5 G/DL (ref 6–8.5)
PSA SERPL-MCNC: 2.14 NG/ML (ref 0–4)
RBC # BLD AUTO: 5.33 10*6/MM3 (ref 4.14–5.8)
SODIUM SERPL-SCNC: 143 MMOL/L (ref 136–145)
T4 FREE SERPL-MCNC: 1.24 NG/DL (ref 0.93–1.7)
TRIGL SERPL-MCNC: 215 MG/DL (ref 0–150)
TSH SERPL DL<=0.05 MIU/L-ACNC: 1.62 UIU/ML (ref 0.27–4.2)
VLDLC SERPL-MCNC: 36 MG/DL (ref 5–40)
WBC # BLD AUTO: 5.26 10*3/MM3 (ref 3.4–10.8)

## 2021-03-31 PROCEDURE — 84439 ASSAY OF FREE THYROXINE: CPT

## 2021-03-31 PROCEDURE — 36415 COLL VENOUS BLD VENIPUNCTURE: CPT

## 2021-03-31 PROCEDURE — 80053 COMPREHEN METABOLIC PANEL: CPT

## 2021-03-31 PROCEDURE — 80061 LIPID PANEL: CPT

## 2021-03-31 PROCEDURE — 73120 X-RAY EXAM OF HAND: CPT

## 2021-03-31 PROCEDURE — G0103 PSA SCREENING: HCPCS

## 2021-03-31 PROCEDURE — 84443 ASSAY THYROID STIM HORMONE: CPT

## 2021-03-31 PROCEDURE — 85027 COMPLETE CBC AUTOMATED: CPT

## 2021-03-31 PROCEDURE — 99396 PREV VISIT EST AGE 40-64: CPT | Performed by: FAMILY MEDICINE

## 2021-03-31 NOTE — PROGRESS NOTES
"Subjective   Jay Saunders is a 55 y.o. male    Chief Complaint    Annual physical exam  Prostate cancer screening  Hypertension   Hyperlipidemia   Anxiety    History of Present Illness  The patient presents today in a fasting state for a physical exam and is also due for lab studies.     The patient complains of burning foot pain in the ball and arch area just above the heel. His foot itches sometimes, and it feels as though a sock is rolled up under his toe all the time. He describes this as intermittent but seems to be more often now.     He also notes 3 to 4 months ago having a deep cut on a finger on his right hand and 3 days later a knot popped up. The patient relates if he stretches the finger out it will hut, but if he makes a fist it \"burns like fire.\" He also notes that he made the repairs to the finger using Dermabond and Steri-Strips.    The patient denies any problems with his bowels or bladder.    The following portions of the patient's history were reviewed and updated as appropriate: allergies, current medications, past social history and problem list    Review of Systems   Constitutional: Negative.  Negative for fatigue and unexpected weight change.   HENT: Negative.    Eyes: Negative.    Respiratory: Negative.  Negative for cough, chest tightness and shortness of breath.    Cardiovascular: Negative.  Negative for chest pain, palpitations and leg swelling.   Gastrointestinal: Negative.  Negative for nausea.   Endocrine: Negative.    Genitourinary: Negative.    Musculoskeletal: Negative.    Skin: Negative.  Negative for color change and rash.   Allergic/Immunologic: Negative.    Neurological: Negative.  Negative for dizziness, syncope, weakness and headaches.   Hematological: Negative.    Psychiatric/Behavioral: Negative.    All other systems reviewed and are negative.      Objective     Vitals:    03/31/21 0851   BP: 126/78   Pulse: 68   Resp: 18   SpO2: 96%       Physical Exam  Vitals and " nursing note reviewed.   Constitutional:       Appearance: He is well-developed.   HENT:      Head: Normocephalic and atraumatic.      Right Ear: External ear normal.      Left Ear: External ear normal.      Nose: Nose normal.   Eyes:      Conjunctiva/sclera: Conjunctivae normal.      Pupils: Pupils are equal, round, and reactive to light.   Neck:      Thyroid: No thyromegaly.      Vascular: No JVD.   Cardiovascular:      Rate and Rhythm: Normal rate and regular rhythm.      Pulses: Normal pulses.      Heart sounds: Normal heart sounds. No murmur heard.     Pulmonary:      Effort: Pulmonary effort is normal. No respiratory distress.      Breath sounds: Normal breath sounds.   Abdominal:      General: Bowel sounds are normal.      Palpations: Abdomen is soft. There is no mass.      Tenderness: There is no abdominal tenderness.   Genitourinary:     Penis: Normal.       Prostate: Normal.      Rectum: Normal.   Musculoskeletal:         General: Normal range of motion.      Cervical back: Normal range of motion and neck supple.   Skin:     General: Skin is warm and dry.   Neurological:      Mental Status: He is alert and oriented to person, place, and time.      Cranial Nerves: No cranial nerve deficit.      Deep Tendon Reflexes: Reflexes are normal and symmetric.         Assessment/Plan   Problems Addressed this Visit        Cardiac and Vasculature    Hyperlipidemia    Relevant Orders    Comprehensive Metabolic Panel    Lipid Panel       Mental Health    Anxiety      Other Visit Diagnoses     Routine general medical examination at a health care facility    -  Primary    Relevant Orders    CBC (No Diff)    Comprehensive Metabolic Panel    Lipid Panel    TSH    T4, Free    Subcutaneous nodule of right hand        Relevant Orders    XR Hand 2 View Right    Prostate cancer screening        Relevant Orders    PSA Screen    Essential hypertension        Relevant Orders    Comprehensive Metabolic Panel    Lipid Panel       Diagnoses       Codes Comments    Routine general medical examination at a health care facility    -  Primary ICD-10-CM: Z00.00  ICD-9-CM: V70.0     Subcutaneous nodule of right hand     ICD-10-CM: R22.31  ICD-9-CM: 782.2     Prostate cancer screening     ICD-10-CM: Z12.5  ICD-9-CM: V76.44     Essential hypertension     ICD-10-CM: I10  ICD-9-CM: 401.9     Anxiety     ICD-10-CM: F41.9  ICD-9-CM: 300.00     Other hyperlipidemia     ICD-10-CM: E78.49  ICD-9-CM: 272.4         Patient is counseled during today's visit regarding preventative health measures to include use of seatbelts, medication safety, healthy diet and regular exercise.     Scribed for LEISA Roque MD by ENRIQUE ALBA.  03/31/21   11:20 EDT    I have personally performed the services described in this document as scribed by the above individual, and it is both accurate and complete.  LEISA Roque MD  3/31/2021  21:20 EDT

## 2021-04-14 ENCOUNTER — TELEPHONE (OUTPATIENT)
Dept: FAMILY MEDICINE CLINIC | Facility: CLINIC | Age: 56
End: 2021-04-14

## 2021-04-14 DIAGNOSIS — R22.31 SUBCUTANEOUS NODULE OF RIGHT HAND: Primary | ICD-10-CM

## 2021-04-14 NOTE — TELEPHONE ENCOUNTER
Caller: Jay Saunders    Relationship: Self    Best call back number: [phone]    What is the medical concern/diagnosis: HAND AND FOOT PAIN    What specialty or service is being requested: REFERRAL TO A HAND SPECIALIST AND FOOT SPECIALIST     What is the provider, practice or medical service name: [facility] DR. MEJIA RECOMMENDS       Any additional details: PATIENT WILL BE OUT OF TOWN 04/14/21 - 04/26/21 AS WELL AS 05/24/2021 - 06/01/21    PLEASE SCHEDULE ACCORDINGLY.

## 2021-05-03 DIAGNOSIS — F41.9 ANXIETY: ICD-10-CM

## 2021-05-03 DIAGNOSIS — E78.49 OTHER HYPERLIPIDEMIA: ICD-10-CM

## 2021-05-03 DIAGNOSIS — I10 ESSENTIAL HYPERTENSION: ICD-10-CM

## 2021-05-03 DIAGNOSIS — K21.9 GASTROESOPHAGEAL REFLUX DISEASE: ICD-10-CM

## 2021-05-04 RX ORDER — PANTOPRAZOLE SODIUM 40 MG/1
TABLET, DELAYED RELEASE ORAL
Qty: 90 TABLET | Refills: 3 | Status: SHIPPED | OUTPATIENT
Start: 2021-05-04 | End: 2022-05-09

## 2021-05-04 RX ORDER — DILTIAZEM HYDROCHLORIDE 240 MG/1
CAPSULE, COATED, EXTENDED RELEASE ORAL
Qty: 90 CAPSULE | Refills: 3 | Status: SHIPPED | OUTPATIENT
Start: 2021-05-04 | End: 2022-05-09

## 2021-05-04 RX ORDER — CITALOPRAM 20 MG/1
TABLET ORAL
Qty: 90 TABLET | Refills: 3 | Status: SHIPPED | OUTPATIENT
Start: 2021-05-04 | End: 2022-05-09

## 2021-05-04 RX ORDER — DICLOFENAC SODIUM 75 MG/1
TABLET, DELAYED RELEASE ORAL
Qty: 180 TABLET | Refills: 3 | Status: SHIPPED | OUTPATIENT
Start: 2021-05-04 | End: 2022-05-09

## 2021-05-04 RX ORDER — SIMVASTATIN 40 MG
TABLET ORAL
Qty: 90 TABLET | Refills: 3 | Status: SHIPPED | OUTPATIENT
Start: 2021-05-04 | End: 2022-05-09

## 2021-05-04 RX ORDER — LISINOPRIL AND HYDROCHLOROTHIAZIDE 20; 12.5 MG/1; MG/1
TABLET ORAL
Qty: 90 TABLET | Refills: 3 | Status: SHIPPED | OUTPATIENT
Start: 2021-05-04 | End: 2022-04-20 | Stop reason: SDUPTHER

## 2021-05-14 ENCOUNTER — PRIOR AUTHORIZATION (OUTPATIENT)
Dept: FAMILY MEDICINE CLINIC | Facility: CLINIC | Age: 56
End: 2021-05-14

## 2021-05-14 NOTE — TELEPHONE ENCOUNTER
PA submitted through CoverMyMeds for patient's Protonix 40 mg tablets.   Key: [id number]  Approved today. Will have additional information in approval fax from insurance.   Notified pharmacy of approval.

## 2021-09-30 ENCOUNTER — OFFICE VISIT (OUTPATIENT)
Dept: FAMILY MEDICINE CLINIC | Facility: CLINIC | Age: 56
End: 2021-09-30

## 2021-09-30 ENCOUNTER — HOSPITAL ENCOUNTER (OUTPATIENT)
Dept: GENERAL RADIOLOGY | Facility: HOSPITAL | Age: 56
Discharge: HOME | End: 2021-09-30
Admitting: FAMILY MEDICINE

## 2021-09-30 VITALS
OXYGEN SATURATION: 98 % | TEMPERATURE: 97 F | WEIGHT: 244 LBS | BODY MASS INDEX: 34.93 KG/M2 | HEART RATE: 66 BPM | SYSTOLIC BLOOD PRESSURE: 118 MMHG | DIASTOLIC BLOOD PRESSURE: 80 MMHG | HEIGHT: 70 IN | RESPIRATION RATE: 16 BRPM

## 2021-09-30 DIAGNOSIS — M25.562 CHRONIC PAIN OF LEFT KNEE: ICD-10-CM

## 2021-09-30 DIAGNOSIS — G89.29 CHRONIC PAIN OF LEFT KNEE: ICD-10-CM

## 2021-09-30 DIAGNOSIS — G89.29 CHRONIC PAIN OF LEFT KNEE: Primary | ICD-10-CM

## 2021-09-30 DIAGNOSIS — M25.562 CHRONIC PAIN OF LEFT KNEE: Primary | ICD-10-CM

## 2021-09-30 DIAGNOSIS — M79.672 PAIN IN BOTH FEET: ICD-10-CM

## 2021-09-30 DIAGNOSIS — M79.671 PAIN IN BOTH FEET: ICD-10-CM

## 2021-09-30 PROCEDURE — 99213 OFFICE O/P EST LOW 20 MIN: CPT | Performed by: FAMILY MEDICINE

## 2021-09-30 PROCEDURE — 73560 X-RAY EXAM OF KNEE 1 OR 2: CPT

## 2021-09-30 NOTE — PROGRESS NOTES
Subjective   Jay Saunders is a 56 y.o. male    Chief Complaint    Chronic left knee pain    History of Present Illness  The patient reports many years of knee pain that seems to be getting worse. He reports that his pain is constant. The patient said it feels like a little lining is under his patella. He has a lot of constant pain in the lateral collateral ligament where the meniscus is. The patient said his feet are still killing him. He is not sure if it is where he changed his gait a little bit. The patient said it has some pops and cranks, but the pain is not in that knee. He said the bone itself is what is hurting.    The patient would like to get some other things done. He said when they redid his wrist, it still is not right.  He said he has no strength in it. If he turns it a certain way it hurts. The patient said he gets as much problem if not more now than he did before he did it.    The patient said he needs to see Dr. Villalobos. He is starting to wake up 2 to 3 times a night and has to roll over and get pillows due to shoulder pain. The patient said the loss of sleep for 30 to 45 minutes, but he is starting to have that 2 to 3 times a night again.    The patient said he trips, and if he does not watch he will stumble. He said he still has foot drop. The patient said he could have hit the ground and jagged something up.    The following portions of the patient's history were reviewed and updated as appropriate: allergies, current medications, past social history and problem list    Review of Systems   Constitutional: Negative.  Negative for chills and fever.   Respiratory: Negative.    Cardiovascular: Negative.    Gastrointestinal: Negative.    Musculoskeletal: Positive for arthralgias and gait problem. Negative for myalgias.   Skin: Negative.    Allergic/Immunologic: Negative for immunocompromised state.   Neurological: Negative for dizziness, tremors, weakness and numbness.   Hematological: Negative  for adenopathy. Does not bruise/bleed easily.   Psychiatric/Behavioral: Negative for behavioral problems and dysphoric mood. The patient is not nervous/anxious.        Objective     Vitals:    09/30/21 1057   BP: 118/80   Pulse: 66   Resp: 16   Temp: 97 °F (36.1 °C)   SpO2: 98%       Physical Exam  Vitals and nursing note reviewed.   Constitutional:       Appearance: He is well-developed. He is obese.   HENT:      Head: Normocephalic and atraumatic.   Eyes:      Conjunctiva/sclera: Conjunctivae normal.      Pupils: Pupils are equal, round, and reactive to light.   Cardiovascular:      Rate and Rhythm: Normal rate and regular rhythm.   Pulmonary:      Effort: Pulmonary effort is normal.      Breath sounds: Normal breath sounds.   Abdominal:      General: Bowel sounds are normal.      Palpations: Abdomen is soft.   Musculoskeletal:      Lumbar back: Tenderness and bony tenderness present. No swelling, deformity or spasms. Decreased range of motion.      Left knee: Crepitus present. No swelling or deformity. Decreased range of motion. Tenderness present over the lateral joint line. No LCL laxity or MCL laxity.     Instability Tests: Anterior drawer test negative. Posterior drawer test negative.   Neurological:      Mental Status: He is alert and oriented to person, place, and time.      Deep Tendon Reflexes: Reflexes are normal and symmetric.   Psychiatric:         Mood and Affect: Mood normal.         Behavior: Behavior normal.         Assessment/Plan   Problems Addressed this Visit     None      Visit Diagnoses     Chronic pain of left knee    -  Primary    Relevant Orders    XR Knee 1 or 2 View Left    Ambulatory Referral to Orthopedic Surgery (Completed)    Pain in both feet        Relevant Orders    Ambulatory Referral to Orthopedic Surgery (Completed)      Diagnoses       Codes Comments    Chronic pain of left knee    -  Primary ICD-10-CM: M25.562, G89.29  ICD-9-CM: 719.46, 338.29     Pain in both feet      ICD-10-CM: M79.671, M79.672  ICD-9-CM: 729.5         Please note that portions of this document were completed with a voice recognition program. Efforts were made to edit the dictations, but occasionally words are mis-transcribed        Transcribed from ambient dictation for LEISA Roque MD by Bette Barrientos.  09/30/21   12:08 EDT    I have personally performed the services described in this document as transcribed by the above individual, and it is both accurate and complete.  LEISA Roque MD  9/30/2021  12:26 EDT

## 2021-10-12 ENCOUNTER — TELEPHONE (OUTPATIENT)
Dept: FAMILY MEDICINE CLINIC | Facility: CLINIC | Age: 56
End: 2021-10-12

## 2021-10-12 NOTE — TELEPHONE ENCOUNTER
PATIENT CALLED TO FOLLOW UP ON HIS REFERRAL TO  [facility] [phone] ABOUT HIS, KNEES, SHOULDER, FEET, AND WRIST ISSUES THAT WERE DISCUSSED IN LAST VISIT WITH DR. ROBERTS. PLEASE RETURN CALL TO GIVE A STATUS UPDATE  [phone]

## 2022-04-19 ENCOUNTER — OFFICE VISIT (OUTPATIENT)
Dept: FAMILY MEDICINE CLINIC | Facility: CLINIC | Age: 57
End: 2022-04-19

## 2022-04-19 ENCOUNTER — LAB (OUTPATIENT)
Dept: LAB | Facility: HOSPITAL | Age: 57
End: 2022-04-19

## 2022-04-19 VITALS
HEIGHT: 70 IN | HEART RATE: 71 BPM | SYSTOLIC BLOOD PRESSURE: 138 MMHG | TEMPERATURE: 96.5 F | RESPIRATION RATE: 16 BRPM | WEIGHT: 238 LBS | OXYGEN SATURATION: 98 % | BODY MASS INDEX: 34.07 KG/M2 | DIASTOLIC BLOOD PRESSURE: 88 MMHG

## 2022-04-19 DIAGNOSIS — F41.9 ANXIETY: ICD-10-CM

## 2022-04-19 DIAGNOSIS — Z00.00 ROUTINE GENERAL MEDICAL EXAMINATION AT A HEALTH CARE FACILITY: Primary | ICD-10-CM

## 2022-04-19 DIAGNOSIS — K21.9 GASTROESOPHAGEAL REFLUX DISEASE, UNSPECIFIED WHETHER ESOPHAGITIS PRESENT: ICD-10-CM

## 2022-04-19 DIAGNOSIS — E78.49 OTHER HYPERLIPIDEMIA: ICD-10-CM

## 2022-04-19 DIAGNOSIS — U09.9 POST COVID-19 CONDITION, UNSPECIFIED: ICD-10-CM

## 2022-04-19 DIAGNOSIS — I10 ESSENTIAL HYPERTENSION: ICD-10-CM

## 2022-04-19 DIAGNOSIS — Z12.5 PROSTATE CANCER SCREENING: ICD-10-CM

## 2022-04-19 DIAGNOSIS — M51.36 DEGENERATIVE DISC DISEASE, LUMBAR: ICD-10-CM

## 2022-04-19 DIAGNOSIS — Z00.00 ROUTINE GENERAL MEDICAL EXAMINATION AT A HEALTH CARE FACILITY: ICD-10-CM

## 2022-04-19 LAB
ALBUMIN SERPL-MCNC: 5 G/DL (ref 3.5–5.2)
ALBUMIN/GLOB SERPL: 2 G/DL
ALP SERPL-CCNC: 57 U/L (ref 39–117)
ALT SERPL W P-5'-P-CCNC: 56 U/L (ref 1–41)
ANION GAP SERPL CALCULATED.3IONS-SCNC: 10.1 MMOL/L (ref 5–15)
AST SERPL-CCNC: 30 U/L (ref 1–40)
BASOPHILS # BLD AUTO: 0.07 10*3/MM3 (ref 0–0.2)
BASOPHILS NFR BLD AUTO: 1.3 % (ref 0–1.5)
BILIRUB SERPL-MCNC: 0.6 MG/DL (ref 0–1.2)
BUN SERPL-MCNC: 13 MG/DL (ref 6–20)
BUN/CREAT SERPL: 12.5 (ref 7–25)
CALCIUM SPEC-SCNC: 9.9 MG/DL (ref 8.6–10.5)
CHLORIDE SERPL-SCNC: 104 MMOL/L (ref 98–107)
CHOLEST SERPL-MCNC: 160 MG/DL (ref 0–200)
CO2 SERPL-SCNC: 28.9 MMOL/L (ref 22–29)
CREAT SERPL-MCNC: 1.04 MG/DL (ref 0.76–1.27)
CRP SERPL-MCNC: <0.3 MG/DL (ref 0–0.5)
DEPRECATED RDW RBC AUTO: 41.6 FL (ref 37–54)
EGFRCR SERPLBLD CKD-EPI 2021: 85.3 ML/MIN/1.73
EOSINOPHIL # BLD AUTO: 0.08 10*3/MM3 (ref 0–0.4)
EOSINOPHIL NFR BLD AUTO: 1.5 % (ref 0.3–6.2)
ERYTHROCYTE [DISTWIDTH] IN BLOOD BY AUTOMATED COUNT: 13.1 % (ref 12.3–15.4)
ERYTHROCYTE [SEDIMENTATION RATE] IN BLOOD: 6 MM/HR (ref 0–20)
GLOBULIN UR ELPH-MCNC: 2.5 GM/DL
GLUCOSE SERPL-MCNC: 88 MG/DL (ref 65–99)
HCT VFR BLD AUTO: 49.5 % (ref 37.5–51)
HDLC SERPL-MCNC: 45 MG/DL (ref 40–60)
HGB BLD-MCNC: 16.8 G/DL (ref 13–17.7)
IMM GRANULOCYTES # BLD AUTO: 0.01 10*3/MM3 (ref 0–0.05)
IMM GRANULOCYTES NFR BLD AUTO: 0.2 % (ref 0–0.5)
LDLC SERPL CALC-MCNC: 94 MG/DL (ref 0–100)
LDLC/HDLC SERPL: 2.03 {RATIO}
LYMPHOCYTES # BLD AUTO: 1.24 10*3/MM3 (ref 0.7–3.1)
LYMPHOCYTES NFR BLD AUTO: 23.8 % (ref 19.6–45.3)
MCH RBC QN AUTO: 29.7 PG (ref 26.6–33)
MCHC RBC AUTO-ENTMCNC: 33.9 G/DL (ref 31.5–35.7)
MCV RBC AUTO: 87.5 FL (ref 79–97)
MONOCYTES # BLD AUTO: 0.55 10*3/MM3 (ref 0.1–0.9)
MONOCYTES NFR BLD AUTO: 10.6 % (ref 5–12)
NEUTROPHILS NFR BLD AUTO: 3.26 10*3/MM3 (ref 1.7–7)
NEUTROPHILS NFR BLD AUTO: 62.6 % (ref 42.7–76)
NRBC BLD AUTO-RTO: 0 /100 WBC (ref 0–0.2)
PLATELET # BLD AUTO: 248 10*3/MM3 (ref 140–450)
PMV BLD AUTO: 9.4 FL (ref 6–12)
POTASSIUM SERPL-SCNC: 4.6 MMOL/L (ref 3.5–5.2)
PROT SERPL-MCNC: 7.5 G/DL (ref 6–8.5)
PSA SERPL-MCNC: 2.98 NG/ML (ref 0–4)
RBC # BLD AUTO: 5.66 10*6/MM3 (ref 4.14–5.8)
SODIUM SERPL-SCNC: 143 MMOL/L (ref 136–145)
T4 FREE SERPL-MCNC: 1.73 NG/DL (ref 0.93–1.7)
TRIGL SERPL-MCNC: 119 MG/DL (ref 0–150)
TSH SERPL DL<=0.05 MIU/L-ACNC: 2.46 UIU/ML (ref 0.27–4.2)
VLDLC SERPL-MCNC: 21 MG/DL (ref 5–40)
WBC NRBC COR # BLD: 5.21 10*3/MM3 (ref 3.4–10.8)

## 2022-04-19 PROCEDURE — G0103 PSA SCREENING: HCPCS

## 2022-04-19 PROCEDURE — 80061 LIPID PANEL: CPT

## 2022-04-19 PROCEDURE — 86140 C-REACTIVE PROTEIN: CPT

## 2022-04-19 PROCEDURE — 85652 RBC SED RATE AUTOMATED: CPT

## 2022-04-19 PROCEDURE — 99396 PREV VISIT EST AGE 40-64: CPT | Performed by: FAMILY MEDICINE

## 2022-04-19 PROCEDURE — 36415 COLL VENOUS BLD VENIPUNCTURE: CPT

## 2022-04-19 PROCEDURE — 80050 GENERAL HEALTH PANEL: CPT

## 2022-04-19 PROCEDURE — 84439 ASSAY OF FREE THYROXINE: CPT

## 2022-04-19 NOTE — PROGRESS NOTES
Subjective   Jay Saunders is a 56 y.o. male    Chief Complaint    Annual physical exam  Prostate cancer screening  Hypertension  Hyperlipidemia  Back pain  Anxiety  History of Present Illness     The patient presents today for his annual physical exam and prostate cancer screening, as well as follow-up on his medical problems. He will soon be due for medication refills and is due for annual laboratory testing.     The patient notes he had COVID-19 in 01/2021. He notes that since then, his blood pressure has been fluctuating more than usual and he has been intermittently hypertensive. The patient also notes mild intermittent chest tightness.    The patient notes his knee pain is still present. He has been seen by orthopedic surgery, Dr. Mejia, since his last visit. The patient notes he was told by Dr. Mejia that he will probably need knee replacement in the next few years. He was given a cortisone injection in his knee, but he states it only provided relief of pain for a few days.     The patient denies any recent swelling to his bilateral hands or feet. He notes occasional swelling when he walks for a long time, but states this resolves once he stops walking.     The patient notes he has been taking Zyrtec in addition to his regular medications for his seasonal allergies. He has not eaten today.      The following portions of the patient's history were reviewed and updated as appropriate: allergies, current medications, past social history and problem list    Review of Systems   Constitutional: Negative.  Negative for appetite change, chills, fatigue, fever and unexpected weight change.   HENT: Negative.    Eyes: Negative.    Respiratory: Negative.  Negative for cough, chest tightness, shortness of breath and wheezing.    Cardiovascular: Negative.  Negative for chest pain, palpitations and leg swelling.   Gastrointestinal: Negative.  Negative for abdominal pain, diarrhea, nausea and vomiting.   Endocrine:  Negative.  Negative for polydipsia, polyphagia and polyuria.   Genitourinary: Negative.  Negative for dysuria, frequency and urgency.   Musculoskeletal: Negative.  Negative for arthralgias, back pain, gait problem and myalgias.   Skin: Negative.  Negative for color change and rash.   Allergic/Immunologic: Negative.    Neurological: Negative.  Negative for dizziness, tremors, syncope, weakness, light-headedness, numbness and headaches.   Hematological: Negative.  Negative for adenopathy. Does not bruise/bleed easily.   Psychiatric/Behavioral: Positive for dysphoric mood and sleep disturbance. Negative for agitation, behavioral problems, confusion, decreased concentration and suicidal ideas. The patient is nervous/anxious.    All other systems reviewed and are negative.      Objective     Vitals:    04/19/22 0902   BP: 138/88   Pulse: 71   Resp: 16   Temp: 96.5 °F (35.8 °C)   SpO2: 98%       Physical Exam  Vitals and nursing note reviewed.   Constitutional:       General: He is not in acute distress.     Appearance: Normal appearance. He is well-developed. He is not ill-appearing, toxic-appearing or diaphoretic.   HENT:      Head: Normocephalic and atraumatic.      Right Ear: External ear normal.      Left Ear: External ear normal.   Eyes:      General: No scleral icterus.     Conjunctiva/sclera: Conjunctivae normal.      Pupils: Pupils are equal, round, and reactive to light.   Neck:      Thyroid: No thyromegaly.      Vascular: No carotid bruit or JVD.   Cardiovascular:      Rate and Rhythm: Normal rate and regular rhythm.      Pulses: Normal pulses.      Heart sounds: Normal heart sounds. No murmur heard.  Pulmonary:      Effort: Pulmonary effort is normal. No respiratory distress.      Breath sounds: Normal breath sounds. No wheezing or rales.   Abdominal:      General: Bowel sounds are normal. There is no distension.      Palpations: Abdomen is soft. There is no mass.      Tenderness: There is no abdominal  tenderness. There is no guarding or rebound.      Hernia: No hernia is present.   Musculoskeletal:         General: No swelling.      Cervical back: Normal range of motion and neck supple.      Lumbar back: Tenderness and bony tenderness present. No swelling, deformity or spasms. Decreased range of motion.   Lymphadenopathy:      Cervical: No cervical adenopathy.   Skin:     General: Skin is warm and dry.      Coloration: Skin is not jaundiced or pale.      Findings: No lesion or rash.   Neurological:      Mental Status: He is alert and oriented to person, place, and time.      Cranial Nerves: No cranial nerve deficit.      Sensory: No sensory deficit.      Motor: No weakness.      Coordination: Coordination normal.      Gait: Gait normal.      Deep Tendon Reflexes: Reflexes are normal and symmetric.   Psychiatric:         Mood and Affect: Mood normal.         Behavior: Behavior normal.         Thought Content: Thought content normal.         Judgment: Judgment normal.         Assessment/Plan   Problems Addressed this Visit        Cardiac and Vasculature    Hyperlipidemia    Relevant Orders    Comprehensive Metabolic Panel    Lipid Panel       Mental Health    Anxiety      Other Visit Diagnoses     Routine general medical examination at a health care facility    -  Primary    Relevant Orders    CBC & Differential    Comprehensive Metabolic Panel    Lipid Panel    TSH    T4, Free    Prostate cancer screening        Relevant Orders    PSA Screen    Essential hypertension        Relevant Orders    Comprehensive Metabolic Panel    TSH    T4, Free    Degenerative disc disease, lumbar        Gastroesophageal reflux disease, unspecified whether esophagitis present        Relevant Orders    CBC & Differential    Comprehensive Metabolic Panel    Post covid-19 condition, unspecified        Relevant Orders    CBC & Differential    Comprehensive Metabolic Panel    TSH    T4, Free    C-reactive Protein    Sedimentation Rate       Diagnoses       Codes Comments    Routine general medical examination at a health care facility    -  Primary ICD-10-CM: Z00.00  ICD-9-CM: V70.0     Prostate cancer screening     ICD-10-CM: Z12.5  ICD-9-CM: V76.44     Other hyperlipidemia     ICD-10-CM: E78.49  ICD-9-CM: 272.4     Essential hypertension     ICD-10-CM: I10  ICD-9-CM: 401.9     Anxiety     ICD-10-CM: F41.9  ICD-9-CM: 300.00     Degenerative disc disease, lumbar     ICD-10-CM: M51.36  ICD-9-CM: 722.52     Gastroesophageal reflux disease, unspecified whether esophagitis present     ICD-10-CM: K21.9  ICD-9-CM: 530.81     Post covid-19 condition, unspecified     ICD-10-CM: U09.9  ICD-9-CM: 139.8         Preventive medicine discussed, diet, exercise, healthy living discussed at length.  Discussed nutrition, physical activity, healthy weight, injury prevention, misuse of tobacco, alcohol and drugs, dental health, mental health, immunizations, screening    Part of this note may be an electronic transcription/translation of spoken language to printed text using the Dragon Dictation System.          Transcribed from ambient dictation for LEISA Roque MD by Margaret Mitchell.  04/19/22   12:59 EDT    Patient verbalized consent to the visit recording.  I have personally performed the services described in this document as transcribed by the above individual, and it is both accurate and complete.  Margaret Mitchell  4/19/2022  13:06 EDT

## 2022-04-20 DIAGNOSIS — I10 ESSENTIAL HYPERTENSION: ICD-10-CM

## 2022-04-20 RX ORDER — LISINOPRIL AND HYDROCHLOROTHIAZIDE 20; 12.5 MG/1; MG/1
2 TABLET ORAL DAILY
Qty: 180 TABLET | Refills: 3 | Status: SHIPPED | OUTPATIENT
Start: 2022-04-20 | End: 2023-02-02

## 2022-05-09 DIAGNOSIS — K21.9 GASTROESOPHAGEAL REFLUX DISEASE: ICD-10-CM

## 2022-05-09 DIAGNOSIS — I10 ESSENTIAL HYPERTENSION: ICD-10-CM

## 2022-05-09 DIAGNOSIS — E78.49 OTHER HYPERLIPIDEMIA: ICD-10-CM

## 2022-05-09 DIAGNOSIS — F41.9 ANXIETY: ICD-10-CM

## 2022-05-09 RX ORDER — DICLOFENAC SODIUM 75 MG/1
TABLET, DELAYED RELEASE ORAL
Qty: 180 TABLET | Refills: 0 | Status: SHIPPED | OUTPATIENT
Start: 2022-05-09 | End: 2022-08-08

## 2022-05-09 RX ORDER — DILTIAZEM HYDROCHLORIDE 240 MG/1
CAPSULE, COATED, EXTENDED RELEASE ORAL
Qty: 90 CAPSULE | Refills: 0 | Status: SHIPPED | OUTPATIENT
Start: 2022-05-09 | End: 2022-08-08

## 2022-05-09 RX ORDER — SIMVASTATIN 40 MG
TABLET ORAL
Qty: 90 TABLET | Refills: 0 | Status: SHIPPED | OUTPATIENT
Start: 2022-05-09 | End: 2022-08-08

## 2022-05-09 RX ORDER — CITALOPRAM 20 MG/1
TABLET ORAL
Qty: 90 TABLET | Refills: 0 | Status: SHIPPED | OUTPATIENT
Start: 2022-05-09 | End: 2022-08-08

## 2022-05-09 RX ORDER — PANTOPRAZOLE SODIUM 40 MG/1
TABLET, DELAYED RELEASE ORAL
Qty: 90 TABLET | Refills: 0 | Status: SHIPPED | OUTPATIENT
Start: 2022-05-09 | End: 2022-08-08

## 2022-08-08 DIAGNOSIS — I10 ESSENTIAL HYPERTENSION: ICD-10-CM

## 2022-08-08 DIAGNOSIS — E78.49 OTHER HYPERLIPIDEMIA: ICD-10-CM

## 2022-08-08 DIAGNOSIS — F41.9 ANXIETY: ICD-10-CM

## 2022-08-08 DIAGNOSIS — K21.9 GASTROESOPHAGEAL REFLUX DISEASE: ICD-10-CM

## 2022-08-08 RX ORDER — DILTIAZEM HYDROCHLORIDE 240 MG/1
CAPSULE, COATED, EXTENDED RELEASE ORAL
Qty: 90 CAPSULE | Refills: 0 | Status: SHIPPED | OUTPATIENT
Start: 2022-08-08 | End: 2022-11-07

## 2022-08-08 RX ORDER — DICLOFENAC SODIUM 75 MG/1
TABLET, DELAYED RELEASE ORAL
Qty: 180 TABLET | Refills: 0 | Status: SHIPPED | OUTPATIENT
Start: 2022-08-08 | End: 2022-11-07

## 2022-08-08 RX ORDER — CITALOPRAM 20 MG/1
TABLET ORAL
Qty: 90 TABLET | Refills: 0 | Status: SHIPPED | OUTPATIENT
Start: 2022-08-08 | End: 2022-11-07

## 2022-08-08 RX ORDER — PANTOPRAZOLE SODIUM 40 MG/1
TABLET, DELAYED RELEASE ORAL
Qty: 90 TABLET | Refills: 0 | Status: SHIPPED | OUTPATIENT
Start: 2022-08-08 | End: 2022-11-07

## 2022-08-08 RX ORDER — SIMVASTATIN 40 MG
TABLET ORAL
Qty: 90 TABLET | Refills: 0 | Status: SHIPPED | OUTPATIENT
Start: 2022-08-08 | End: 2022-11-07

## 2022-11-06 DIAGNOSIS — E78.49 OTHER HYPERLIPIDEMIA: ICD-10-CM

## 2022-11-06 DIAGNOSIS — K21.9 GASTROESOPHAGEAL REFLUX DISEASE: ICD-10-CM

## 2022-11-06 DIAGNOSIS — F41.9 ANXIETY: ICD-10-CM

## 2022-11-06 DIAGNOSIS — I10 ESSENTIAL HYPERTENSION: ICD-10-CM

## 2022-11-07 RX ORDER — SIMVASTATIN 40 MG
TABLET ORAL
Qty: 90 TABLET | Refills: 0 | Status: SHIPPED | OUTPATIENT
Start: 2022-11-07 | End: 2023-02-02

## 2022-11-07 RX ORDER — DICLOFENAC SODIUM 75 MG/1
TABLET, DELAYED RELEASE ORAL
Qty: 180 TABLET | Refills: 0 | Status: SHIPPED | OUTPATIENT
Start: 2022-11-07 | End: 2023-02-02

## 2022-11-07 RX ORDER — DILTIAZEM HYDROCHLORIDE 240 MG/1
CAPSULE, COATED, EXTENDED RELEASE ORAL
Qty: 90 CAPSULE | Refills: 0 | Status: SHIPPED | OUTPATIENT
Start: 2022-11-07 | End: 2023-02-02

## 2022-11-07 RX ORDER — PANTOPRAZOLE SODIUM 40 MG/1
TABLET, DELAYED RELEASE ORAL
Qty: 90 TABLET | Refills: 0 | Status: SHIPPED | OUTPATIENT
Start: 2022-11-07 | End: 2023-02-02

## 2022-11-07 RX ORDER — CITALOPRAM 20 MG/1
TABLET ORAL
Qty: 90 TABLET | Refills: 0 | Status: SHIPPED | OUTPATIENT
Start: 2022-11-07 | End: 2023-02-02

## 2022-11-10 ENCOUNTER — OFFICE VISIT (OUTPATIENT)
Dept: FAMILY MEDICINE CLINIC | Facility: CLINIC | Age: 57
End: 2022-11-10

## 2022-11-10 VITALS
DIASTOLIC BLOOD PRESSURE: 80 MMHG | RESPIRATION RATE: 16 BRPM | BODY MASS INDEX: 33.64 KG/M2 | HEIGHT: 70 IN | OXYGEN SATURATION: 98 % | SYSTOLIC BLOOD PRESSURE: 138 MMHG | WEIGHT: 235 LBS | HEART RATE: 69 BPM | TEMPERATURE: 96.5 F

## 2022-11-10 DIAGNOSIS — I10 ESSENTIAL HYPERTENSION: ICD-10-CM

## 2022-11-10 DIAGNOSIS — F41.9 ANXIETY: Primary | ICD-10-CM

## 2022-11-10 DIAGNOSIS — E78.49 OTHER HYPERLIPIDEMIA: ICD-10-CM

## 2022-11-10 DIAGNOSIS — M79.673 HEEL PAIN, UNSPECIFIED LATERALITY: ICD-10-CM

## 2022-11-10 PROCEDURE — 99214 OFFICE O/P EST MOD 30 MIN: CPT | Performed by: FAMILY MEDICINE

## 2022-11-10 NOTE — PROGRESS NOTES
Subjective   Jay Saunders is a 57 y.o. male    Chief Complaint    Hypertension  Hyperlipidemia  Gastroesophageal reflux disease  Anxiety    History of Present Illness  The patient presents today for a follow-up primarily regarding hypertension due to an increase in his antihypertensive medication in 04/2022. His blood pressure improved today to 138/80 mmHg. He is due for multiple medication refills as listed.    Hypertension  He reports he has taken medications for 30 years. The patient states when he exhibits pain, he breaks 1 tablet in half or takes a whole tablet. He states he has never taken drugs or smoked. The patient notes he drinks beer or a shot of bourbon. He reports he has lost weight. The patient walks between 3 to 4 miles every day and drinks a lot of water.    Foot pain  The patient reports he would like a referral to a podiatrist. He states he has a bruise on his foot that he believes is a bone spur. The patient notes he gets 4,000 to 5,000 steps. He reports he exhibits pain once passes the 4,000 to 5,000 step threshold.    The following portions of the patient's history were reviewed and updated as appropriate: allergies, current medications, past social history and problem list    Review of Systems   Constitutional: Negative for appetite change, chills, fatigue, fever and unexpected weight change.   Respiratory: Negative for cough, chest tightness, shortness of breath and wheezing.    Cardiovascular: Negative for chest pain, palpitations and leg swelling.   Gastrointestinal: Negative for abdominal distention, abdominal pain, diarrhea, nausea and vomiting.        Patient experiencing heartburn/acid reflux     Endocrine: Negative for polydipsia, polyphagia and polyuria.   Genitourinary: Negative for dysuria, frequency and urgency.   Musculoskeletal: Negative for arthralgias, back pain and myalgias.   Skin: Negative for color change and rash.   Neurological: Negative for dizziness, tremors,  syncope, weakness, light-headedness and headaches.   Hematological: Negative for adenopathy. Does not bruise/bleed easily.   Psychiatric/Behavioral: Positive for dysphoric mood and sleep disturbance. Negative for agitation, behavioral problems, confusion, decreased concentration and suicidal ideas. The patient is nervous/anxious.        Objective     Vitals:    11/10/22 1533   BP: 138/80   Pulse: 69   Resp: 16   Temp: 96.5 °F (35.8 °C)   SpO2: 98%       Physical Exam  Vitals and nursing note reviewed.   Constitutional:       General: He is not in acute distress.     Appearance: Normal appearance. He is well-developed. He is not ill-appearing, toxic-appearing or diaphoretic.   HENT:      Head: Normocephalic.   Eyes:      General: No scleral icterus.     Conjunctiva/sclera: Conjunctivae normal.      Pupils: Pupils are equal, round, and reactive to light.   Neck:      Thyroid: No thyromegaly.      Vascular: No carotid bruit or JVD.   Cardiovascular:      Rate and Rhythm: Normal rate and regular rhythm.      Pulses: Normal pulses.      Heart sounds: Normal heart sounds. No murmur heard.  Pulmonary:      Effort: Pulmonary effort is normal. No respiratory distress.      Breath sounds: Normal breath sounds. No wheezing or rales.   Abdominal:      General: Bowel sounds are normal. There is no distension.      Palpations: Abdomen is soft. There is no mass.      Tenderness: There is no abdominal tenderness. There is no guarding or rebound.      Hernia: No hernia is present.   Musculoskeletal:      Cervical back: Neck supple.   Lymphadenopathy:      Cervical: No cervical adenopathy.   Skin:     General: Skin is warm and dry.      Coloration: Skin is not jaundiced or pale.      Findings: No rash.   Neurological:      Mental Status: He is alert and oriented to person, place, and time.   Psychiatric:         Behavior: Behavior normal.         Assessment & Plan     Problems Addressed this Visit        Cardiac and Vasculature     Hyperlipidemia       Mental Health    Anxiety - Primary   Other Visit Diagnoses     Essential hypertension        Heel pain, unspecified laterality        Relevant Orders    Ambulatory Referral to Orthopedic Surgery      Diagnoses       Codes Comments    Anxiety    -  Primary ICD-10-CM: F41.9  ICD-9-CM: 300.00     Essential hypertension     ICD-10-CM: I10  ICD-9-CM: 401.9     Other hyperlipidemia     ICD-10-CM: E78.49  ICD-9-CM: 272.4     Heel pain, unspecified laterality     ICD-10-CM: M79.673  ICD-9-CM: 729.5         I spent 25 minutes in patient care: Reviewing records prior to the visit, examining the patient, entering orders and documentation    Part of this note may be an electronic transcription/translation of spoken language to printed text using the Dragon Dictation System.           Transcribed from ambient dictation for LEISA Roque MD by Lori Bullock.  11/10/22   17:51 EST  Patient or patient representative verbalized consent to the visit recording.  I have personally performed the services described in this document as transcribed by the above individual, and it is both accurate and complete.

## 2022-11-21 ENCOUNTER — OFFICE VISIT (OUTPATIENT)
Dept: ORTHOPEDIC SURGERY | Facility: CLINIC | Age: 57
End: 2022-11-21

## 2022-11-21 VITALS
WEIGHT: 235.89 LBS | BODY MASS INDEX: 33.77 KG/M2 | DIASTOLIC BLOOD PRESSURE: 82 MMHG | SYSTOLIC BLOOD PRESSURE: 120 MMHG | HEIGHT: 70 IN

## 2022-11-21 DIAGNOSIS — M79.671 FOOT PAIN, BILATERAL: Primary | ICD-10-CM

## 2022-11-21 DIAGNOSIS — M20.22 HALLUX RIGIDUS OF BOTH FEET: ICD-10-CM

## 2022-11-21 DIAGNOSIS — M79.672 PAIN OF LEFT HEEL: ICD-10-CM

## 2022-11-21 DIAGNOSIS — M20.21 HALLUX RIGIDUS OF BOTH FEET: ICD-10-CM

## 2022-11-21 DIAGNOSIS — M79.672 FOOT PAIN, BILATERAL: Primary | ICD-10-CM

## 2022-11-21 PROCEDURE — 99204 OFFICE O/P NEW MOD 45 MIN: CPT | Performed by: ORTHOPAEDIC SURGERY

## 2022-11-21 NOTE — PROGRESS NOTES
[facility] [place] [place]     Office Visit     Date: 11/21/2022   Patient Name: Jay Saunders  MRN: [id number]  YOB: 1965    Chief Complaint:   Chief Complaint   Patient presents with   • Left Foot - Pain   • Right Foot - Pain       Referring Physician: Hank Roque*     History of Present Illness: Jay Saunders is a 57 y.o. male who is here today for bilateral great toe pain and left heel pain.  States has been bothering him for the last several months.  Denies any injury or trauma.    Subjective   Review of Systems   Constitutional: Negative.  Negative for chills, fatigue and fever.   HENT: Negative.  Negative for congestion and dental problem.    Eyes: Negative.  Negative for blurred vision.   Respiratory: Negative.  Negative for shortness of breath.    Cardiovascular: Negative.  Negative for leg swelling.   Gastrointestinal: Negative.  Negative for abdominal pain.   Endocrine: Negative.  Negative for polyuria.   Genitourinary: Negative.  Negative for difficulty urinating.   Musculoskeletal: Positive for arthralgias.   Skin: Negative.    Allergic/Immunologic: Negative.    Neurological: Negative.    Hematological: Negative.  Negative for adenopathy.   Psychiatric/Behavioral: Negative.  Negative for behavioral problems.        Past Medical History:   Past Medical History:   Diagnosis Date   • Acute maxillary sinusitis    • Anxiety    • Essential hypertension, benign    • Hyperlipidemia    • Lumbar spondylosis    • Osteoarthritis    • Routine general medical examination at a health care facility    • Shoulder pain    • Special screening for malignant neoplasm of prostate    • Spinal stenosis        Past Surgical History:   Past Surgical History:   Procedure Laterality Date   • BACK SURGERY     • EYE SURGERY     • HIP SURGERY     • MOUTH SURGERY     • SHOULDER SURGERY     • VASECTOMY         Family History:   Family History   Problem  "Relation Age of Onset   • Thyroid disease Mother    • Hypertension Father        Social History:   Social History     Socioeconomic History   • Marital status:    Tobacco Use   • Smoking status: Former   • Smokeless tobacco: Never   Substance and Sexual Activity   • Alcohol use: No     Comment: 10/13/1995   • Drug use: No       Medications:   Current Outpatient Medications:   •  citalopram (CeleXA) 20 MG tablet, Take 1 tablet by mouth once daily, Disp: 90 tablet, Rfl: 0  •  diclofenac (VOLTAREN) 75 MG EC tablet, Take 1 tablet by mouth twice daily, Disp: 180 tablet, Rfl: 0  •  dilTIAZem CD (CARDIZEM CD) 240 MG 24 hr capsule, Take 1 capsule by mouth once daily, Disp: 90 capsule, Rfl: 0  •  lisinopril-hydrochlorothiazide (PRINZIDE,ZESTORETIC) 20-12.5 MG per tablet, Take 2 tablets by mouth Daily., Disp: 180 tablet, Rfl: 3  •  pantoprazole (PROTONIX) 40 MG EC tablet, Take 1 tablet by mouth once daily, Disp: 90 tablet, Rfl: 0  •  simvastatin (ZOCOR) 40 MG tablet, Take 1 tablet by mouth once daily, Disp: 90 tablet, Rfl: 0  •  tiZANidine (ZANAFLEX) 2 MG tablet, Take 1 tablet by mouth Every 8 (Eight) Hours As Needed for Muscle Spasms., Disp: 30 tablet, Rfl: 1    Allergies: No Known Allergies    I reviewed the patient's chief complaint, history of present illness, review of systems, past medical history, surgical history, family history, social history, medications and allergy list   Objective    Vital Signs:   Vitals:    11/21/22 0810   BP: 120/82   Weight: 107 kg (235 lb 14.3 oz)   Height: 177.8 cm (70\")       Ortho Exam:  left LE Foot and Ankle Exam:   Normal gait pattern. Hindfoot alignment is neutral. Plantigrade foot.   Neurological exam of the superficial peroneal, deep peroneal, plantar, sural and saphenous nerves demonstrates intact sensation and normal motor function.   Peripheral pulses including posterior tibial artery and deep peroneal artery are intact and palpable. There is good perfusion to the toes. " "  The skin is intact throughout the foot and ankle without ulceration.   Range of motion of ankle, subtalar joint, midfoot is within normal limits.   There is tenderness to palpation at the plantar aspect of the posteromedial heel at the plantar fascia insertion site.   There is limited motion of the first MTP joint bilaterally, there is pain with mid arc passive ROM.     Results Review:   XR Foot 3+ View Bilateral  Bilateral Foot X-Ray 11/21/22   Indication: Pain  Views: 3 weight bearing , comparison none  Findings: xrays reviewed by me today in the office and show bilateral   first MTP joint space narrowing with dorsal osteophyte formation worse on   the right than left, degenerative changes consistent with age, no other   osseous abnormality     Assessment / Plan    Assessment/Plan:   Diagnoses and all orders for this visit:    1. Foot pain, bilateral (Primary)  -     XR Foot 3+ View Bilateral    2. Pain of left heel    3. Hallux rigidus of both feet    Discussed heel pain/injury (undiagnosed new problem with uncertain prognosis) with patient as well as treatment options at length. Decision regarding surgical intervention considered. Patient is not a candidate due to trial of nonoperative management. I explained to the the patient that we will treat this as plantar fasciitis. I explained the treatments that are statistically significant at improving the problem include stretching/night splinting and casting.    I explained the most important treatments: Stretching and night splinting. I provided them with an educational handout and showed them the stretches and they were able to reproduce them. I emphasized the \"toe pull\" as the most important stretch. They need to do the stretches ~5 repetitions each, 6-8 times per day.  I explained how to do and also provided them with information regarding getting a night splint.    If they do not improve after 4 months of aggressive stretching and night splinting, the next " "step is typically a short leg fiberglass walking cast worn for 6 weeks.    Discussed with patient that most patients with this condition improve with nonoperative treatment however it frequently takes at least several months. I did explain to patient that if conservative management fails there are surgical treatment options however they are not 100% successful.     I also explained to the patient he has arthritis of both big toe MTP joints. It is commonly genetic, but may also occur after trauma (such as a turf toe injury). It is often bilateral. I explained that arthritis by definition is a loss of cartilage. We do not make cartilage in our bodies as adults, once it begins to wear out it will continue to wear out. As this happens, the body puts extra bone around the joint.  We call this osteophyte formation.    We discussed treatment options at length:  1. Turf toe orthotics    2. NSAIDs (OTC Aleve, Advil, etc) when painful I also recommended voltaren gel   3. Cortisone injection - the injection can be done every 3 months    Surgery is reserved for failure of conservative treatment. There are 2 surgeries that I  generally recommend. They are both \"salvage\" procedures, as we cannot make new cartilage. For patients who have some cartilage remaining I do a cheilectomy and osteotomy. This only helps pain about 75% of the time. For a failure of the cheilectomy and osteotomy or for a patient with very little cartilage in the joint I do a fusion of the 1st MTP joint or the Cartiva procedure.     Provided patient with information for plantar fascia exercises, obtaining carbon fiber footplate, was provided gel heel cups in clinic today, recommend Voltaren gel as needed, recommend accommodative shoe wear for the future.  Recommend return to clinic as needed for possible future steroid injections for MTP joints.    Follow Up:   Return if symptoms worsen or fail to improve.      Nathanael Harvey MD  [facility] [place] [facility]  "

## 2022-11-21 NOTE — PATIENT INSTRUCTIONS
Plantar Fasciitis    What is the plantar fascia?  The plantar fascia is a web of strong bands of fibrous connective tissue beneath the skin on the bottom of the foot  This tissue travels along the arch of the foot, from the base of the heel bone (calcaneus) to the ball of the foot (the metatarsophalangeal joints)      The plantar fascia functions to support the arch of the foot, especially during the forefoot push-off phase of the gait cycle    What is plantar fasciitis?  Plantar fasciitis is the most common cause of heel pain   The pain is typically experienced at the bottom of the heel and sometimes along the arch of the foot  Other less typical pain patterns have been observed  Pain is often worsened by physical activity and improved with rest  Pain can be pronounced during the first few steps in the morning or during the first few steps after a period of rest        What causes plantar fasciitis?  Plantar fasciitis is most accurately characterized as a degenerative condition  The term degenerative is used to describe conditions that are caused by repetitive “wear and tear”   For the plantar fascia, these small “wear and tear” injuries tend to accumulate near where the connective tissue band attaches to the heel bone  The body will naturally try to heal this damaged tissue and this may produce some swelling and inflammation localized to the bottom of the heel area  The plantar fascia tissue is constantly under some degree of tension and this tension is increased with weight-bearing and high impact activities  Changes in activity level and inadequate or poorly supportive footwear may play a role in the development of plantar fasciitis although some cases develop spontaneously without an obvious explanation       Some patients with heel pain from plantar fasciitis may have bone spurs on their x-ray. Bone spurs do not cause plantar fasciitis. Many people without heel pain and no diagnosis of plantar fasciitis will  have bone spurs were they to have x-rays performed.     What is the treatment for plantar fasciitis?  Plantar fasciitis rarely needs surgery  The vast majority of patients recover normal painless function through a combination of the below nonsurgical treatment strategies  The most important thing to recognize is that it takes time to recover and that the most successful patients are those that apply the below strategies consistently over time  Nonsurgical treatment options  Temporary immobilization:  Some patients present with a plantar fascia that is very acutely inflamed and exquisitely tender to the touch. It is difficult for these patients to walk and they do so with a limp. These patients often benefit from a period of stricter rest, such as in a walking boot, to jumpstart the healing process and calm down the aggravated tissue. For these cases a boot may be recommended for 1-2 weeks.  Temporary activity modifications:   Avoid high impact activities such as power walking, running, using the treadmill, hiking and jumping. These activities place a lot of strain on the plantar fascia and can impede its ability to heal. Continuation of these activities through the treatment or recovery period may prolong the duration of symptoms.   Stretching exercises:      Healing of the plantar fascia tissue is dependent upon the flexibility of the calf muscle. If the calf is tight, this leads to greater tension along the plantar fascia tissue during walking and can obstruct healing. There is good evidence that a consistent calf stretching program can improve symptoms of plantar fasciitis and lead to recovery. Improving calf and Achilles tendon flexibility does not happen overnight, however. It can take 6-12 weeks of routine stretching before improvements are realized. Stretching and massage techniques along the plantar fascia in the arch of the foot can also be tried with ice and rollers.     Orthotics:      In general, a well  "cushioned soft orthotic or insert will be better tolerated when the bottom of the heel is sore. It is recommended that this orthotic or insert also include some form of arch support. Gel heel cups can also be tried to further cushion the painful heel area.     Footwear modifications:  Patients prefer shoes with a thicker and more rigid supportive sole. Shoes with a rocker-bottom style sole are helpful because the foot rolls with the shoe on the walking surface. This type of sole limits the need for the forefoot to push-off and this decreases the demands and strain on the plantar fascia tissue. Many footwear companies offer this style now. [employer] is one such brand.     Night splinting:  Many patients share that pain is the worst in the mornings with the first few steps of the day. This is probably due to the way the ankle rests in plantarflexion (toes pointed down) during sleep. A plantarflexed ankle leads to a contracted or tight position for the calf and plantar fascia tissue. There is some evidence to suggest that keeping the ankle in a neutral (90-degree) position overnight can help alleviate some of the morning symptoms. This can be accomplished with a night splint. These can be searched for and       purchased on Amazon for $20-40. Amazon: type in \"Plantar Fasciitis Night Splint\"    Injections:  Injections are typically reserved for patients with symptoms that are not responding to the above measures. Steroid is one type of injection that is effective for pain control but usually temporary. On occasion patients may expect 3-6 months of relief. Symptoms will likely return, however, if this treatment is performed in isolation. It is not advisable to perform these often as there are risks such as heel fat pad atrophy. Other injections are also available such as platelet-rich-plasma (PRP). This is still a relatively new treatment technique, but some early evidence suggests that it may be more effective than steroid. "   Anti-inflammatory medications:      Non-steroidal anti-inflammatory medications (NSAIDs) may be tried. There are over-the-counter and prescription options. These are typically taken by mouth, but there are also topical formulations. Plantar fasciitis is not considered an inflammatory condition and so the use of these medications in isolation will rarely solve the underlying problem. Rather, these medications can help with pain while other, more effective strategies like stretching, are employed. Some patients have medical conditions that limit their ability to take NSAIDs.     Other treatments:  Extracorporeal shock wave therapy (ESWT), radiofrequency ablation (RFA), iontophoresis and the Tenex procedure are alternative second-line treatments.   Surgical treatment options  Nonsurgical treatment has a very high rate of success for plantar fasciitis. It is rare that a patient needs surgery, and this is usually not considered until around 12 months of failed conservative treatment.   The surgical treatment for plantar fasciitis is called a plantar fasciotomy. This is basically a partial surgical release of the damaged plantar fascia band near the attachment on the heel bone. This can be done with open or endoscopic (scope) technique. With the open technique there is a single few centimeter incision on the inner aspect of the heel. With the scope technique there are two very small poke-hole incisions on either side of the heel. If there is a bone spur on the bottom of the heel bone, this is usually removed at the time of surgery.   Occasionally, a calf or heel cord lengthening procedure may be recommended at the same time as the plantar fasciotomy surgery. This is basically the surgical way to stretch the calf. This procedure is called a gastrocnemius recession.     Resources:  Anyi COLBY, Mikey QUAN, Jose Ramon DP, Olivia PA, Sam TT, Stone GE, Tanvi MILLIGAN. Plantar fascia-specific stretching exercise improves  outcomes in patients with chronic plantar fasciitis. A prospective clinical trial with two-year follow-up. J Bone Joint Surg Am. 2006 Aug;88(8):1775-81.  John CJ, Milka D, Kim BP. Correlation Between Gastrocnemius Tightness and Heel Pain Severity in Plantar Fasciitis. Foot Ankle Int. 2021 Jan;42(1):76-82.  Some images were obtained from the American Academy of Orthopedic Surgery's patient education resource called OrthoInfo. Additional information can be found at www.orthoinfo.org. Search “plantar fasciitis”.      Plantar Fasciitis Rehabilitation Stretches/Exercises:     YOU MUST DO:   5 REPETITIONS          6-8 TIMES PER DAY            FOR 4 MONTHS     Cross the injured leg over other leg. While placing fingers along base of toes, pull toes back toward shin until stretch felt in plantar fascia.           Stand with the ball of the injured foot on a stair. Reach for the bottom step with your heel until stretching felt in arch of foot. Hold this position for 30-60 seconds then relax and repeat.        Standing calf stretch - While facing a wall, put your hands against the wall at eye level. Keep injured leg back and uninjured leg forward while keeping the heel of the injured leg on the floor. Turn injured foot slightly inward while slowly leaning inward until you feel the back of your calf on the injured leg stretch. Repeat 3 times.        Towel Stretch - Sit on a hard surface with your injured leg stretched out. Wrap a towel around the foot of the stretched out leg and pull the towel toward your body, stretching the back of the calf for 30 seconds. Repeat 3 times.        Frozen can roll - Roll bare injured foot back and forth from heel to mid-arch over a frozen juice can. Repeat for 3-5 minutes. This exercise is particularly useful in the morning.        Sitting toe raise - Sit in a chair with your feet flat on the floor. Raise the toes and ball of injured foot while keeping heel on the floor. Hold for 5 seconds,  "repeat 10 times and perform 3 sets of 10.        Towel pickup - With your heel on the ground,  a towel with your toes and release. Repeat 10-20 times.     References:  1. Angelica ALVES, Gianluca NEWMAN (1999) Plantar fasciitis: etiology and treatment. Journal of Orthopaedic & Sports Physical Therapy 29, 756-952  2. KALYN Kincaid Special Foot Exercises [Internet]. [facility]. Available from [email or web address]-exercises       [employer]bon [employer] Inserts      www.[employer]  www.amazon.com, type in \"carbon fiber insole\"       [employer]                  Online:  www.[employer]  www.[employer]/en/us/  [employer]    [place], [place]:   [employer] [place]   [address], [address], [facility] [address]   [email or web address]      [employer]   [address] [facility], [facility], [address]   [email or web address]      [employer]   [address], [place] [address]   [email or web address]      [employer]   [address] , [address], [place] [address]   [email or web address]                "

## 2023-02-01 DIAGNOSIS — K21.9 GASTROESOPHAGEAL REFLUX DISEASE: ICD-10-CM

## 2023-02-01 DIAGNOSIS — F41.9 ANXIETY: ICD-10-CM

## 2023-02-01 DIAGNOSIS — I10 ESSENTIAL HYPERTENSION: ICD-10-CM

## 2023-02-01 DIAGNOSIS — E78.49 OTHER HYPERLIPIDEMIA: ICD-10-CM

## 2023-02-02 RX ORDER — DILTIAZEM HYDROCHLORIDE 240 MG/1
CAPSULE, COATED, EXTENDED RELEASE ORAL
Qty: 90 CAPSULE | Refills: 0 | Status: SHIPPED | OUTPATIENT
Start: 2023-02-02

## 2023-02-02 RX ORDER — SIMVASTATIN 40 MG
TABLET ORAL
Qty: 90 TABLET | Refills: 0 | Status: SHIPPED | OUTPATIENT
Start: 2023-02-02

## 2023-02-02 RX ORDER — LISINOPRIL AND HYDROCHLOROTHIAZIDE 20; 12.5 MG/1; MG/1
TABLET ORAL
Qty: 180 TABLET | Refills: 0 | Status: SHIPPED | OUTPATIENT
Start: 2023-02-02

## 2023-02-02 RX ORDER — PANTOPRAZOLE SODIUM 40 MG/1
TABLET, DELAYED RELEASE ORAL
Qty: 90 TABLET | Refills: 0 | Status: SHIPPED | OUTPATIENT
Start: 2023-02-02

## 2023-02-02 RX ORDER — CITALOPRAM 20 MG/1
TABLET ORAL
Qty: 90 TABLET | Refills: 0 | Status: SHIPPED | OUTPATIENT
Start: 2023-02-02

## 2023-02-02 RX ORDER — DICLOFENAC SODIUM 75 MG/1
TABLET, DELAYED RELEASE ORAL
Qty: 180 TABLET | Refills: 0 | Status: SHIPPED | OUTPATIENT
Start: 2023-02-02

## 2023-04-17 DIAGNOSIS — I10 ESSENTIAL HYPERTENSION: ICD-10-CM

## 2023-04-17 RX ORDER — LISINOPRIL AND HYDROCHLOROTHIAZIDE 20; 12.5 MG/1; MG/1
TABLET ORAL
Qty: 180 TABLET | Refills: 0 | Status: SHIPPED | OUTPATIENT
Start: 2023-04-17

## 2023-05-07 DIAGNOSIS — F41.9 ANXIETY: ICD-10-CM

## 2023-05-07 DIAGNOSIS — K21.9 GASTROESOPHAGEAL REFLUX DISEASE: ICD-10-CM

## 2023-05-07 DIAGNOSIS — E78.49 OTHER HYPERLIPIDEMIA: ICD-10-CM

## 2023-05-07 DIAGNOSIS — I10 ESSENTIAL HYPERTENSION: ICD-10-CM

## 2023-05-08 RX ORDER — CITALOPRAM 20 MG/1
TABLET ORAL
Qty: 90 TABLET | Refills: 0 | Status: SHIPPED | OUTPATIENT
Start: 2023-05-08

## 2023-05-08 RX ORDER — DILTIAZEM HYDROCHLORIDE 240 MG/1
240 CAPSULE, COATED, EXTENDED RELEASE ORAL DAILY
Qty: 90 CAPSULE | Refills: 0 | Status: SHIPPED | OUTPATIENT
Start: 2023-05-08

## 2023-05-08 RX ORDER — DICLOFENAC SODIUM 75 MG/1
TABLET, DELAYED RELEASE ORAL
Qty: 180 TABLET | Refills: 0 | Status: SHIPPED | OUTPATIENT
Start: 2023-05-08

## 2023-05-08 RX ORDER — PANTOPRAZOLE SODIUM 40 MG/1
TABLET, DELAYED RELEASE ORAL
Qty: 90 TABLET | Refills: 0 | Status: SHIPPED | OUTPATIENT
Start: 2023-05-08

## 2023-05-08 RX ORDER — SIMVASTATIN 40 MG
TABLET ORAL
Qty: 90 TABLET | Refills: 0 | Status: SHIPPED | OUTPATIENT
Start: 2023-05-08

## 2023-05-30 ENCOUNTER — TELEPHONE (OUTPATIENT)
Dept: FAMILY MEDICINE CLINIC | Facility: CLINIC | Age: 58
End: 2023-05-30

## 2023-05-30 NOTE — TELEPHONE ENCOUNTER
"Spoke with pt; he said he's been taking Imodium and Pedialyte and the diarrhea/nausea/vomiting has improved, just wanted to make sure he was \"doing the right things\". I told him it sounds like he is but if sx get worse between now and appt tomorrow to go to ER for work-up/possible IV. He wanted provider to know this is his second round with this in about 4 months and he's concerned. I told him to mention to his provider tomorrow and she'd likely do labs/testing.  "

## 2023-05-30 NOTE — TELEPHONE ENCOUNTER
Patient would a return call to discuss his symptoms. He states he has lost 14 pounds in 4 days.   [facility] scheduled the patient for an appointment tomorrow.

## 2023-05-31 ENCOUNTER — OFFICE VISIT (OUTPATIENT)
Dept: FAMILY MEDICINE CLINIC | Facility: CLINIC | Age: 58
End: 2023-05-31

## 2023-05-31 ENCOUNTER — LAB (OUTPATIENT)
Dept: FAMILY MEDICINE CLINIC | Facility: CLINIC | Age: 58
End: 2023-05-31

## 2023-05-31 VITALS
BODY MASS INDEX: 31.92 KG/M2 | HEART RATE: 64 BPM | WEIGHT: 223 LBS | SYSTOLIC BLOOD PRESSURE: 118 MMHG | TEMPERATURE: 98.7 F | HEIGHT: 70 IN | OXYGEN SATURATION: 98 % | DIASTOLIC BLOOD PRESSURE: 66 MMHG

## 2023-05-31 DIAGNOSIS — Z00.00 GENERAL MEDICAL EXAM: ICD-10-CM

## 2023-05-31 DIAGNOSIS — R63.4 UNEXPLAINED WEIGHT LOSS: ICD-10-CM

## 2023-05-31 DIAGNOSIS — R19.7 DIARRHEA, UNSPECIFIED TYPE: Primary | ICD-10-CM

## 2023-05-31 DIAGNOSIS — R19.7 NAUSEA VOMITING AND DIARRHEA: ICD-10-CM

## 2023-05-31 DIAGNOSIS — R11.2 NAUSEA VOMITING AND DIARRHEA: ICD-10-CM

## 2023-05-31 LAB
BASOPHILS # BLD AUTO: 0.04 10*3/MM3 (ref 0–0.2)
BASOPHILS NFR BLD AUTO: 1 % (ref 0–1.5)
DEPRECATED RDW RBC AUTO: 40.9 FL (ref 37–54)
EOSINOPHIL # BLD AUTO: 0.03 10*3/MM3 (ref 0–0.4)
EOSINOPHIL NFR BLD AUTO: 0.8 % (ref 0.3–6.2)
ERYTHROCYTE [DISTWIDTH] IN BLOOD BY AUTOMATED COUNT: 12.8 % (ref 12.3–15.4)
HCT VFR BLD AUTO: 48.1 % (ref 37.5–51)
HGB BLD-MCNC: 16.9 G/DL (ref 13–17.7)
IMM GRANULOCYTES # BLD AUTO: 0.01 10*3/MM3 (ref 0–0.05)
IMM GRANULOCYTES NFR BLD AUTO: 0.3 % (ref 0–0.5)
LYMPHOCYTES # BLD AUTO: 1.25 10*3/MM3 (ref 0.7–3.1)
LYMPHOCYTES NFR BLD AUTO: 32 % (ref 19.6–45.3)
MCH RBC QN AUTO: 30.8 PG (ref 26.6–33)
MCHC RBC AUTO-ENTMCNC: 35.1 G/DL (ref 31.5–35.7)
MCV RBC AUTO: 87.8 FL (ref 79–97)
MONOCYTES # BLD AUTO: 0.46 10*3/MM3 (ref 0.1–0.9)
MONOCYTES NFR BLD AUTO: 11.8 % (ref 5–12)
NEUTROPHILS NFR BLD AUTO: 2.12 10*3/MM3 (ref 1.7–7)
NEUTROPHILS NFR BLD AUTO: 54.1 % (ref 42.7–76)
NRBC BLD AUTO-RTO: 0 /100 WBC (ref 0–0.2)
PLATELET # BLD AUTO: 226 10*3/MM3 (ref 140–450)
PMV BLD AUTO: 9 FL (ref 6–12)
RBC # BLD AUTO: 5.48 10*6/MM3 (ref 4.14–5.8)
WBC NRBC COR # BLD: 3.91 10*3/MM3 (ref 3.4–10.8)

## 2023-05-31 PROCEDURE — 82150 ASSAY OF AMYLASE: CPT | Performed by: PHYSICIAN ASSISTANT

## 2023-05-31 PROCEDURE — 99214 OFFICE O/P EST MOD 30 MIN: CPT | Performed by: PHYSICIAN ASSISTANT

## 2023-05-31 PROCEDURE — 83690 ASSAY OF LIPASE: CPT | Performed by: PHYSICIAN ASSISTANT

## 2023-05-31 PROCEDURE — 80050 GENERAL HEALTH PANEL: CPT | Performed by: PHYSICIAN ASSISTANT

## 2023-05-31 PROCEDURE — 84481 FREE ASSAY (FT-3): CPT | Performed by: PHYSICIAN ASSISTANT

## 2023-05-31 PROCEDURE — 86677 HELICOBACTER PYLORI ANTIBODY: CPT | Performed by: PHYSICIAN ASSISTANT

## 2023-05-31 PROCEDURE — 84439 ASSAY OF FREE THYROXINE: CPT | Performed by: PHYSICIAN ASSISTANT

## 2023-05-31 RX ORDER — ONDANSETRON 8 MG/1
8 TABLET, ORALLY DISINTEGRATING ORAL EVERY 8 HOURS PRN
Qty: 20 TABLET | Refills: 0 | Status: SHIPPED | OUTPATIENT
Start: 2023-05-31

## 2023-05-31 NOTE — PROGRESS NOTES
Subjective   Jay Saunders is a 58 y.o. male  Diarrhea (Intermittent diarrhea since April, last for a few weeks, stops then comes back, currently taking imodium, probiotic and pepto-bismul ) and Vomiting (Intermittent vomiting and nausea since April, last for a few weeks, stops then comes back)      History of Present Illness      The patient is a 58-year-old male seen today for evaluation of sudden weight loss, nausea, vomiting, and diarrhea.    The patient has lost 14 pounds in the last several days. He was at work on Thursday, 05/25/2023, and became nauseous as the day went on. This is the third occurrence since 04/2023. He will experience symptoms for a few days and then it will stop, and he will start gaining weight. He has experienced diarrhea with every episode and each time the number of days has increased. However, this time he experienced vomiting yellow bile. He experienced syncope at the time of vomiting. While at work, he was unable to eat much, he just tried putting something in his abdomen and then he started experiencing diarrhea. He has not had diarrhea since Sunday, 05/28/2023 secondary to taking Imodium. He wakes up in the morning, and experiencing nausea like someone that has sinus drainage. He is drinking excessive amounts of Pedialyte, Gatorade, and water. He takes pantoprazole for acid reflux, which is well controlled. He denies blood in his stool. He denies hematemesis. He denies any fever. He denies any other family members are sick at the time it occurs. He denies being diagnosed with a peptic ulcer. He denies having trouble digesting food. He denies any specific food that causes it. He has regular bowel movements. His bowels are yellowish green in appearance. He periodically has abdominal cramps. He has not had his gallbladder removed. He has had a colonoscopy multiple times previously that all were normal. He denies it started after he traveled. He has lived all through [place] and  [place] and has never experienced abdominal problems. He has not had any blood work or stool studies done. He has been able to eat without vomiting. He is urinating well. He denies blood in his urine. He denies any hernias.  He has worked this week.     His blood pressure is well controlled.    The following portions of the patient's history were reviewed and updated as appropriate: allergies, current medications, past social history and problem list    Review of Systems   Constitutional: Positive for appetite change and unexpected weight change.   Respiratory: Negative.    Cardiovascular: Negative.    Gastrointestinal: Positive for diarrhea, nausea and vomiting. Negative for anal bleeding, blood in stool and constipation.   Genitourinary: Negative.    Allergic/Immunologic: Negative.        Objective     Vitals:    05/31/23 1129   BP: 118/66   Pulse: 64   Temp: 98.7 °F (37.1 °C)   SpO2: 98%     A review of systems was performed, and the pertinent positives are noted in the HPI.    Physical Exam  Vitals and nursing note reviewed.   Constitutional:       General: He is not in acute distress.     Appearance: Normal appearance. He is well-developed. He is not ill-appearing, toxic-appearing or diaphoretic.      Comments: BMI32   Eyes:      General: No scleral icterus.     Conjunctiva/sclera: Conjunctivae normal.   Cardiovascular:      Rate and Rhythm: Normal rate and regular rhythm.   Pulmonary:      Effort: Pulmonary effort is normal. No respiratory distress.      Breath sounds: Normal breath sounds.   Abdominal:      General: Bowel sounds are normal. There is no distension.      Palpations: Abdomen is soft. There is no mass.      Tenderness: There is no abdominal tenderness. There is no guarding or rebound.      Hernia: No hernia is present.   Skin:     General: Skin is warm and dry.      Coloration: Skin is not jaundiced or pale.      Findings: No erythema or rash.   Neurological:      Mental Status: He is  alert and oriented to person, place, and time.   Psychiatric:         Mood and Affect: Mood normal.         Behavior: Behavior normal.         Assessment & Plan     Diagnoses and all orders for this visit:    1. Diarrhea, unspecified type (Primary)  -     CBC & Differential; Future  -     Comprehensive metabolic panel; Future  -     TSH; Future  -     T4, Free; Future  -     T3, free; Future  -     Amylase; Future  -     Lipase; Future  -     H.pylori,IgG / IgA Antibodies; Future  -     Gastrointestinal Panel, PCR - Stool, Per Rectum; Future  -     CBC & Differential  -     Comprehensive metabolic panel  -     TSH  -     T4, Free  -     T3, free  -     Amylase  -     Lipase  -     H.pylori,IgG / IgA Antibodies    2. Unexplained weight loss  -     CBC & Differential; Future  -     Comprehensive metabolic panel; Future  -     TSH; Future  -     T4, Free; Future  -     T3, free; Future  -     Amylase; Future  -     Lipase; Future  -     H.pylori,IgG / IgA Antibodies; Future  -     CBC & Differential  -     Comprehensive metabolic panel  -     TSH  -     T4, Free  -     T3, free  -     Amylase  -     Lipase  -     H.pylori,IgG / IgA Antibodies    3. Nausea vomiting and diarrhea  -     CBC & Differential; Future  -     Comprehensive metabolic panel; Future  -     TSH; Future  -     T4, Free; Future  -     T3, free; Future  -     Amylase; Future  -     Lipase; Future  -     H.pylori,IgG / IgA Antibodies; Future  -     Gastrointestinal Panel, PCR - Stool, Per Rectum; Future  -     CBC & Differential  -     Comprehensive metabolic panel  -     TSH  -     T4, Free  -     T3, free  -     Amylase  -     Lipase  -     H.pylori,IgG / IgA Antibodies    Other orders  -     ondansetron ODT (ZOFRAN-ODT) 8 MG disintegrating tablet; Place 1 tablet on the tongue Every 8 (Eight) Hours As Needed for Nausea or Vomiting.  Dispense: 20 tablet; Refill: 0    1. Nausea and vomiting  - We will obtain blood work today.  - We will obtain a stool  specimen.  - If I do not find an answer with the labs and the stool study by the first of the week, then I will refer him to gastroenterology and either get him rescoped again or ultrasound of the gallbladder.  - I will prescribe Zofran for nausea.    Transcribed from ambient dictation for Argentina Osborn PA-C by Kezia Webster.  05/31/23   15:35 EDT    Patient or patient representative verbalized consent to the visit recording.  I have personally performed the services described in this document as transcribed by the above individual, and it is both accurate and complete.  Argentina Osborn PA-C  5/31/2023  16:02 EDT

## 2023-06-01 LAB
ALBUMIN SERPL-MCNC: 4.7 G/DL (ref 3.5–5.2)
ALBUMIN/GLOB SERPL: 1.7 G/DL
ALP SERPL-CCNC: 55 U/L (ref 39–117)
ALT SERPL W P-5'-P-CCNC: 47 U/L (ref 1–41)
AMYLASE SERPL-CCNC: 45 U/L (ref 28–100)
ANION GAP SERPL CALCULATED.3IONS-SCNC: 15.3 MMOL/L (ref 5–15)
AST SERPL-CCNC: 29 U/L (ref 1–40)
BILIRUB SERPL-MCNC: 0.6 MG/DL (ref 0–1.2)
BUN SERPL-MCNC: 13 MG/DL (ref 6–20)
BUN/CREAT SERPL: 14.9 (ref 7–25)
CALCIUM SPEC-SCNC: 9.7 MG/DL (ref 8.6–10.5)
CHLORIDE SERPL-SCNC: 98 MMOL/L (ref 98–107)
CO2 SERPL-SCNC: 25.7 MMOL/L (ref 22–29)
CREAT SERPL-MCNC: 0.87 MG/DL (ref 0.76–1.27)
EGFRCR SERPLBLD CKD-EPI 2021: 101.3 ML/MIN/1.73
GLOBULIN UR ELPH-MCNC: 2.8 GM/DL
GLUCOSE SERPL-MCNC: 73 MG/DL (ref 65–99)
LIPASE SERPL-CCNC: 37 U/L (ref 13–60)
POTASSIUM SERPL-SCNC: 3.7 MMOL/L (ref 3.5–5.2)
PROT SERPL-MCNC: 7.5 G/DL (ref 6–8.5)
SODIUM SERPL-SCNC: 139 MMOL/L (ref 136–145)
T3FREE SERPL-MCNC: 4.63 PG/ML (ref 2–4.4)
T4 FREE SERPL-MCNC: 1.84 NG/DL (ref 0.93–1.7)
TSH SERPL DL<=0.05 MIU/L-ACNC: 1.83 UIU/ML (ref 0.27–4.2)

## 2023-06-01 PROCEDURE — 87507 IADNA-DNA/RNA PROBE TQ 12-25: CPT | Performed by: PHYSICIAN ASSISTANT

## 2023-06-02 ENCOUNTER — TELEPHONE (OUTPATIENT)
Dept: FAMILY MEDICINE CLINIC | Facility: CLINIC | Age: 58
End: 2023-06-02

## 2023-06-02 DIAGNOSIS — E05.90 HYPERTHYROIDISM: Primary | ICD-10-CM

## 2023-06-02 LAB
ADV 40+41 DNA STL QL NAA+NON-PROBE: NOT DETECTED
ASTRO TYP 1-8 RNA STL QL NAA+NON-PROBE: NOT DETECTED
C CAYETANENSIS DNA STL QL NAA+NON-PROBE: NOT DETECTED
C COLI+JEJ+UPSA DNA STL QL NAA+NON-PROBE: NOT DETECTED
CRYPTOSP DNA STL QL NAA+NON-PROBE: NOT DETECTED
E HISTOLYT DNA STL QL NAA+NON-PROBE: NOT DETECTED
EAEC PAA PLAS AGGR+AATA ST NAA+NON-PRB: NOT DETECTED
EC STX1+STX2 GENES STL QL NAA+NON-PROBE: NOT DETECTED
EPEC EAE GENE STL QL NAA+NON-PROBE: NOT DETECTED
ETEC LTA+ST1A+ST1B TOX ST NAA+NON-PROBE: NOT DETECTED
G LAMBLIA DNA STL QL NAA+NON-PROBE: NOT DETECTED
H PYLORI IGA SER-ACNC: <9 UNITS (ref 0–8.9)
H PYLORI IGG SER IA-ACNC: 0.7 INDEX VALUE (ref 0–0.79)
NOROVIRUS GI+II RNA STL QL NAA+NON-PROBE: NOT DETECTED
P SHIGELLOIDES DNA STL QL NAA+NON-PROBE: NOT DETECTED
RVA RNA STL QL NAA+NON-PROBE: DETECTED
S ENT+BONG DNA STL QL NAA+NON-PROBE: NOT DETECTED
SAPO I+II+IV+V RNA STL QL NAA+NON-PROBE: NOT DETECTED
SHIGELLA SP+EIEC IPAH ST NAA+NON-PROBE: NOT DETECTED
V CHOL+PARA+VUL DNA STL QL NAA+NON-PROBE: NOT DETECTED
V CHOLERAE DNA STL QL NAA+NON-PROBE: NOT DETECTED
Y ENTEROCOL DNA STL QL NAA+NON-PROBE: NOT DETECTED

## 2023-06-02 NOTE — TELEPHONE ENCOUNTER
Pt notified and verbalized an understanding; he said he is actually feeling better; please place referral for endo.

## 2023-06-02 NOTE — TELEPHONE ENCOUNTER
Jadon from  lab in [place] called with critical value:  Pts GI panel showed + for Rotavirus A.

## 2023-06-02 NOTE — TELEPHONE ENCOUNTER
Please let patient know that his stool panel is positive for rotavirus and his labs show that he is slightly hyperthyroid.  Please see if he is feeling any better.  Regarding the hyperthyroidism I need to refer him to an endocrinologist, it could be causing some of the weight loss, typically rotavirus is treated with rehydration and avoiding antidiarrheal medications as they can trap the infection in the gut.  I am hoping that his diarrhea has improved and that if so his weight loss I think is due to the hyperthyroidism

## 2023-06-16 ENCOUNTER — OFFICE VISIT (OUTPATIENT)
Dept: ENDOCRINOLOGY | Facility: CLINIC | Age: 58
End: 2023-06-16
Payer: COMMERCIAL

## 2023-06-16 VITALS
HEART RATE: 64 BPM | SYSTOLIC BLOOD PRESSURE: 138 MMHG | DIASTOLIC BLOOD PRESSURE: 82 MMHG | HEIGHT: 70 IN | BODY MASS INDEX: 32.93 KG/M2 | WEIGHT: 230 LBS

## 2023-06-16 DIAGNOSIS — R94.6 ABNORMAL THYROID FUNCTION TEST: Primary | ICD-10-CM

## 2023-06-16 PROCEDURE — 99203 OFFICE O/P NEW LOW 30 MIN: CPT | Performed by: INTERNAL MEDICINE

## 2023-06-16 NOTE — ASSESSMENT & PLAN NOTE
He has normal TSH but mildly elevated free T4 and free T3.  He is clinically euthyroid.  No goiter on exam.  I'm not sure why the T4 and T3 are high but it doesn't seem to be causing any issues.  He may have an abnormal thyroid binding protein.  This wouldn't cause any problems.  Will continue observation at this time.

## 2023-06-16 NOTE — PROGRESS NOTES
"     Office Note      Date: 2023  Patient Name: Jay Saunders  MRN: [id number]  : 1965    Chief Complaint   Patient presents with    Abnormal Lab       History of Present Illness:   Jay Saunders is a 58 y.o. male who presents for Abnormal Lab    He denies any prior h/o thyroid disease.  He hasn't taken any thyroid meds.  The TSH has been consistently normal.  He had labs done 2022.  The TSH was 2.46.  The free T4 was 1.73 (ULN 1.70).  He had labs done 2023.  The TSH was normal at 1.83.  The free T4 was mildly elevated at 1.84.  The free T4 was 4.63 (ULN 4.4).  He denies any symptoms of hypo- or hyperthyroidism at this time.  He denies any h/o goiter.  He hasn't noted any change in the size of his neck.  He denies any compressive sxs.  He denies any recent steroid use.  He hasn't taken any amiodarone.  He denies any biotin use.    Subjective      Patient was born where: [place].  Facial radiation exposure: No.  High iodine intake: No  Family hx of thyroid disease: Yes, describe: mother with hypothyroidism.    Review of Systems:   Review of Systems   Constitutional: Negative.    HENT:  Positive for hearing loss.    Eyes: Negative.    Respiratory: Negative.     Cardiovascular: Negative.    Gastrointestinal: Negative.    Endocrine: Negative.    Genitourinary:  Positive for difficulty urinating.   Musculoskeletal:  Positive for arthralgias and back pain.   Skin: Negative.    Allergic/Immunologic: Negative.    Neurological: Negative.    Hematological: Negative.    Psychiatric/Behavioral: Negative.       The following portions of the patient's history were reviewed and updated as appropriate: allergies, current medications, past family history, past medical history, past social history, past surgical history, and problem list.    Objective     Visit Vitals  /82 (BP Location: Left arm, Patient Position: Sitting, Cuff Size: Adult)   Pulse 64   Ht 177.8 cm (70\")   Wt 104 kg (230 lb)   BMI 33.00 " kg/m²       Physical Exam:  Physical Exam  Constitutional:       Appearance: Normal appearance.   HENT:      Head: Normocephalic and atraumatic.   Eyes:      Extraocular Movements: Extraocular movements intact.      Conjunctiva/sclera: Conjunctivae normal.      Pupils: Pupils are equal, round, and reactive to light.   Neck:      Thyroid: No thyroid mass, thyromegaly or thyroid tenderness.   Cardiovascular:      Rate and Rhythm: Normal rate and regular rhythm.      Pulses: Normal pulses.      Heart sounds: Normal heart sounds.   Pulmonary:      Effort: Pulmonary effort is normal.      Breath sounds: Normal breath sounds.   Abdominal:      General: Bowel sounds are normal.      Palpations: Abdomen is soft.   Musculoskeletal:         General: Normal range of motion.      Cervical back: Normal range of motion and neck supple.   Lymphadenopathy:      Cervical: No cervical adenopathy.   Skin:     General: Skin is warm and dry.   Neurological:      General: No focal deficit present.      Mental Status: He is alert.   Psychiatric:         Mood and Affect: Mood normal.         Behavior: Behavior normal.         Thought Content: Thought content normal.         Judgment: Judgment normal.       Labs:    TSH  No results found for: TSHBASE     Free T4  Free T4   Date Value Ref Range Status   05/31/2023 1.84 (H) 0.93 - 1.70 ng/dL Final       T3  No results found for: S1FJKUO      TPO  No results found for: THYROIDAB    TG AB  No results found for: THGAB    TG  No results found for: THYROGLB    CBC w/DIFF  Lab Results   Component Value Date    WBC 3.91 05/31/2023    RBC 5.48 05/31/2023    HGB 16.9 05/31/2023    HCT 48.1 05/31/2023    MCV 87.8 05/31/2023    MCH 30.8 05/31/2023    MCHC 35.1 05/31/2023    RDW 12.8 05/31/2023    RDWSD 40.9 05/31/2023    MPV 9.0 05/31/2023     05/31/2023    NEUTRORELPCT 54.1 05/31/2023    LYMPHORELPCT 32.0 05/31/2023    MONORELPCT 11.8 05/31/2023    EOSRELPCT 0.8 05/31/2023    BASORELPCT 1.0  05/31/2023    AUTOIGPER 0.3 05/31/2023    NEUTROABS 2.12 05/31/2023    LYMPHSABS 1.25 05/31/2023    MONOSABS 0.46 05/31/2023    EOSABS 0.03 05/31/2023    BASOSABS 0.04 05/31/2023    AUTOIGNUM 0.01 05/31/2023    NRBC 0.0 05/31/2023           Assessment / Plan      Assessment & Plan:  Diagnoses and all orders for this visit:    1. Abnormal thyroid function test (Primary)  Assessment & Plan:  He has normal TSH but mildly elevated free T4 and free T3.  He is clinically euthyroid.  No goiter on exam.  I'm not sure why the T4 and T3 are high but it doesn't seem to be causing any issues.  He may have an abnormal thyroid binding protein.  This wouldn't cause any problems.  Will continue observation at this time.         Current Outpatient Medications   Medication Instructions    citalopram (CeleXA) 20 MG tablet Take 1 tablet by mouth once daily    diclofenac (VOLTAREN) 75 MG EC tablet Take 1 tablet by mouth twice daily    dilTIAZem CD (CARDIZEM CD) 240 mg, Oral, Daily    lisinopril-hydrochlorothiazide (PRINZIDE,ZESTORETIC) 20-12.5 MG per tablet Take 2 tablets by mouth once daily    ondansetron ODT (ZOFRAN-ODT) 8 mg, Translingual, Every 8 Hours PRN    pantoprazole (PROTONIX) 40 MG EC tablet Take 1 tablet by mouth once daily    simvastatin (ZOCOR) 40 MG tablet Take 1 tablet by mouth once daily    tiZANidine (ZANAFLEX) 2 mg, Oral, Every 8 Hours PRN      Return in about 6 months (around 12/16/2023) for Recheck with TSH, free T4.    Tu Mcbride MD   06/16/2023

## 2023-07-30 DIAGNOSIS — K21.9 GASTROESOPHAGEAL REFLUX DISEASE: ICD-10-CM

## 2023-07-30 DIAGNOSIS — I10 ESSENTIAL HYPERTENSION: ICD-10-CM

## 2023-07-30 DIAGNOSIS — E78.49 OTHER HYPERLIPIDEMIA: ICD-10-CM

## 2023-07-30 DIAGNOSIS — F41.9 ANXIETY: ICD-10-CM

## 2023-07-31 ENCOUNTER — TELEPHONE (OUTPATIENT)
Dept: FAMILY MEDICINE CLINIC | Facility: CLINIC | Age: 58
End: 2023-07-31

## 2023-07-31 RX ORDER — DILTIAZEM HYDROCHLORIDE 240 MG/1
240 CAPSULE, COATED, EXTENDED RELEASE ORAL DAILY
Qty: 90 CAPSULE | Refills: 0 | Status: SHIPPED | OUTPATIENT
Start: 2023-07-31

## 2023-07-31 RX ORDER — DICLOFENAC SODIUM 75 MG/1
TABLET, DELAYED RELEASE ORAL
Qty: 180 TABLET | Refills: 0 | Status: SHIPPED | OUTPATIENT
Start: 2023-07-31

## 2023-07-31 RX ORDER — CITALOPRAM 20 MG/1
TABLET ORAL
Qty: 90 TABLET | Refills: 0 | Status: SHIPPED | OUTPATIENT
Start: 2023-07-31

## 2023-07-31 RX ORDER — PANTOPRAZOLE SODIUM 40 MG/1
TABLET, DELAYED RELEASE ORAL
Qty: 90 TABLET | Refills: 0 | Status: SHIPPED | OUTPATIENT
Start: 2023-07-31

## 2023-07-31 RX ORDER — SIMVASTATIN 40 MG
TABLET ORAL
Qty: 90 TABLET | Refills: 0 | Status: SHIPPED | OUTPATIENT
Start: 2023-07-31

## 2023-07-31 NOTE — TELEPHONE ENCOUNTER
Janeth notified.   Pre-Op H&P  Rony He  [id number]  1952    Chief complaint: infrarenal abdominal aortic aneurysm    HPI:    Patient is a 68 y.o.male who presents today for an endovascular abdominal aortic aneurysm repair for an infrarenal abdominal aortic aneurysm. He has a history of stage III kidney disease and he underwent an attempted renal angiogram which revealed an increase in size of the abdominal aortic aneurysm, which now measures 5.3 cm. He notes that he's known about the aneurysm but when it was initially discovered, it was too small to require intervention.     He has a history of coronary artery disease s/p coronary stenting with his last stent being placed in February of 2019. He notes that he was continuing to have fatigue, shortness of breath and episodes of high blood pressure after his stenting. His cardiologist changed some of his medication and he has not experienced any of these symptoms since early in 2020. He denies current or recent chest pain or shortness of breath. He also has a history of CHF, his last ECHO was in 2019 with an EF of 55%.    Review of Systems:  General ROS: negative for chills, fever or skin lesions;  No changes since last office visit.  Neg for recent sick exposure  Cardiovascular ROS: no chest pain or dyspnea on exertion  Respiratory ROS: no cough, shortness of breath, or wheezing    Allergies:   Allergies   Allergen Reactions   • Amlodipine Swelling   • Carvedilol Itching   • Penicillins Myalgia       Home Meds:    No current facility-administered medications on file prior to encounter.      Current Outpatient Medications on File Prior to Encounter   Medication Sig Dispense Refill   • aspirin 81 MG chewable tablet Chew 1 tablet Daily. 90 tablet 3   • atorvastatin (LIPITOR) 80 MG tablet Take 1 tablet by mouth Daily. 30 tablet 11   • clopidogrel (PLAVIX) 75 MG tablet Take 1 tablet by mouth Daily. (Patient taking differently: Take 75 mg by mouth Daily. Was instructed by   "V to continue) 30 tablet 11   • isosorbide mononitrate (IMDUR) 60 MG 24 hr tablet TAKE 1 TABLET BY MOUTH ONCE DAILY (Patient taking differently: Take  by mouth 2 (two) times a day. 120 mg in the morning and 90 mg at 15:00 in the afternoon) 90 tablet 3       PMH:   Past Medical History:   Diagnosis Date   • Angina at rest (CMS/HCC)    • CHF (congestive heart failure) (CMS/HCC)    • Chronic kidney disease, stage 3    • COPD (chronic obstructive pulmonary disease) (CMS/HCC)    • Dyslipidemia    • Heart failure (CMS/HCC)    • Heart murmur    • Hyperlipidemia    • Hypertension    • Kidney stone    • Myocardial infarction (CMS/HCC)     ad[facility] due to heart   • On home oxygen therapy     2L at HS    • Probable coronary artery disease    • Rheumatic fever    • Unstable angina (CMS/HCC)    • Wears dentures     upper   • Wears glasses      PSH:    Past Surgical History:   Procedure Laterality Date   • APPENDECTOMY     • CARDIAC CATHETERIZATION     • CARDIAC CATHETERIZATION N/A 2019    Procedure: Left Heart Cath;  Surgeon: Cody Nascimento MD;  Location: [facility] CATH INVASIVE LOCATION;  Service: Cardiovascular   • CORONARY ANGIOPLASTY WITH STENT PLACEMENT      x2   • HERNIA REPAIR      X 2   • OTHER SURGICAL HISTORY      renal angiogram    • OTHER SURGICAL HISTORY      \"Tapped back and drained fluid\"            Social History:   Tobacco:   Social History     Tobacco Use   Smoking Status Former Smoker   • Packs/day: 1.50   • Years: 44.00   • Pack years: 66.00   • Types: Cigarettes   • Quit date: 2020   • Years since quittin.8   Smokeless Tobacco Never Used      Alcohol:     Social History     Substance and Sexual Activity   Alcohol Use No       Vitals:           /80 (BP Location: Right arm, Patient Position: Lying)   Temp 97.5 °F (36.4 °C) (Temporal)   Resp 14   Ht 177.8 cm (70\")   Wt 90.3 kg (199 lb)   SpO2 95%   BMI 28.55 kg/m²     Physical Exam:  General Appearance:    " Alert, cooperative, no distress, appears stated age   Head:    Normocephalic, without obvious abnormality, atraumatic   Lungs:     Clear to auscultation bilaterally, respirations unlabored    Heart:   Regular rate and rhythm, S1 and S2 normal with audible systolic murmur     Abdomen:    Soft, nontender.       Genitalia:    deferred   Extremities:   Extremities normal, atraumatic, no cyanosis or edema   Skin:   Skin color, texture, turgor normal, no rashes or lesions   Neurologic:   Grossly intact   Results Review  LABS:  Lab Results   Component Value Date    WBC 9.77 11/10/2020    HGB 10.6 (L) 11/10/2020    HCT 34.4 (L) 11/10/2020    MCV 86.2 11/10/2020     11/10/2020    NEUTROABS 8.49 (H) 03/04/2019    GLUCOSE 108 (H) 11/10/2020    BUN 39 (H) 11/10/2020    CREATININE 1.95 (H) 11/10/2020    EGFRIFNONA 35 (L) 11/10/2020     11/10/2020    K 4.6 11/10/2020     11/10/2020    CO2 25.0 11/10/2020    MG 1.7 02/12/2019    PHOS 6.0 (H) 02/14/2019    CALCIUM 8.9 11/10/2020    ALBUMIN 4.00 04/12/2019    AST 12 04/12/2019    ALT 15 04/12/2019    BILITOT 0.2 04/12/2019       RADIOLOGY:  Imaging Results (Last 72 Hours)     ** No results found for the last 72 hours. **          I reviewed the patient's new clinical results.      Impression:   1. Infrarenal abdominal aortic aneurysm    Plan:   1. Endovascular abdominal aortic aneurysm repair   Patient was seen in the office and risks and benefits were discussed. Please see office note in the chart for details.     ERVIN Stewart   11/11/2020   07:31 EST

## 2023-07-31 NOTE — TELEPHONE ENCOUNTER
Pharmacy Name: [facility] [address] [place] [address] [id number]  - [phone] [facility] [phone]      Pharmacy representative name: HAYDER    Pharmacy representative phone number: [phone]     What medication are you calling in regards to: SIMVASTATIN AND DILTIAZEM    What question does the pharmacy have: LEANDRO STATED THAT THIS COMBINATION HAS AN INCREASED RISK OF INTERACTION. IT IS STRONGLY RECOMMENDED THAT THE SIMVASTATIN BE DROPPED TO 10MG TO LOWER THAT RISK. PLEASE CALL TO DISCUSS.    Who is the provider that prescribed the medication: DR. MEJIA

## 2023-10-22 DIAGNOSIS — F41.9 ANXIETY: ICD-10-CM

## 2023-10-22 DIAGNOSIS — E78.49 OTHER HYPERLIPIDEMIA: ICD-10-CM

## 2023-10-22 DIAGNOSIS — K21.9 GASTROESOPHAGEAL REFLUX DISEASE: ICD-10-CM

## 2023-10-22 DIAGNOSIS — I10 ESSENTIAL HYPERTENSION: ICD-10-CM

## 2023-10-23 RX ORDER — PANTOPRAZOLE SODIUM 40 MG/1
TABLET, DELAYED RELEASE ORAL
Qty: 90 TABLET | Refills: 0 | Status: SHIPPED | OUTPATIENT
Start: 2023-10-23

## 2023-10-23 RX ORDER — CITALOPRAM 20 MG/1
TABLET ORAL
Qty: 90 TABLET | Refills: 0 | Status: SHIPPED | OUTPATIENT
Start: 2023-10-23

## 2023-10-23 RX ORDER — DICLOFENAC SODIUM 75 MG/1
TABLET, DELAYED RELEASE ORAL
Qty: 180 TABLET | Refills: 0 | Status: SHIPPED | OUTPATIENT
Start: 2023-10-23

## 2023-10-23 RX ORDER — SIMVASTATIN 40 MG
TABLET ORAL
Qty: 90 TABLET | Refills: 0 | Status: SHIPPED | OUTPATIENT
Start: 2023-10-23

## 2023-10-23 RX ORDER — LISINOPRIL AND HYDROCHLOROTHIAZIDE 20; 12.5 MG/1; MG/1
TABLET ORAL
Qty: 180 TABLET | Refills: 0 | Status: SHIPPED | OUTPATIENT
Start: 2023-10-23

## 2023-10-23 RX ORDER — DILTIAZEM HYDROCHLORIDE 240 MG/1
240 CAPSULE, COATED, EXTENDED RELEASE ORAL DAILY
Qty: 90 CAPSULE | Refills: 0 | Status: SHIPPED | OUTPATIENT
Start: 2023-10-23

## 2023-12-19 ENCOUNTER — OFFICE VISIT (OUTPATIENT)
Dept: FAMILY MEDICINE CLINIC | Facility: CLINIC | Age: 58
End: 2023-12-19
Payer: COMMERCIAL

## 2023-12-19 ENCOUNTER — LAB (OUTPATIENT)
Dept: FAMILY MEDICINE CLINIC | Facility: CLINIC | Age: 58
End: 2023-12-19
Payer: COMMERCIAL

## 2023-12-19 VITALS
BODY MASS INDEX: 34.65 KG/M2 | SYSTOLIC BLOOD PRESSURE: 126 MMHG | OXYGEN SATURATION: 97 % | HEART RATE: 73 BPM | DIASTOLIC BLOOD PRESSURE: 78 MMHG | WEIGHT: 242 LBS | HEIGHT: 70 IN | TEMPERATURE: 97.8 F

## 2023-12-19 DIAGNOSIS — M25.511 CHRONIC PAIN IN RIGHT SHOULDER: ICD-10-CM

## 2023-12-19 DIAGNOSIS — Z00.00 GENERAL MEDICAL EXAM: Primary | ICD-10-CM

## 2023-12-19 DIAGNOSIS — K21.9 GASTROESOPHAGEAL REFLUX DISEASE: ICD-10-CM

## 2023-12-19 DIAGNOSIS — E78.49 OTHER HYPERLIPIDEMIA: ICD-10-CM

## 2023-12-19 DIAGNOSIS — I10 ESSENTIAL HYPERTENSION, BENIGN: ICD-10-CM

## 2023-12-19 DIAGNOSIS — G89.29 CHRONIC PAIN IN RIGHT SHOULDER: ICD-10-CM

## 2023-12-19 DIAGNOSIS — G57.93 NEUROPATHY OF BOTH FEET: ICD-10-CM

## 2023-12-19 DIAGNOSIS — Z12.5 PROSTATE CANCER SCREENING: ICD-10-CM

## 2023-12-19 DIAGNOSIS — M19.071 ARTHRITIS OF RIGHT FOOT: ICD-10-CM

## 2023-12-19 DIAGNOSIS — E78.2 MIXED HYPERLIPIDEMIA: ICD-10-CM

## 2023-12-19 DIAGNOSIS — G89.29 WRIST PAIN, CHRONIC, LEFT: ICD-10-CM

## 2023-12-19 DIAGNOSIS — R94.6 ABNORMAL THYROID FUNCTION TEST: ICD-10-CM

## 2023-12-19 DIAGNOSIS — I10 ESSENTIAL HYPERTENSION: ICD-10-CM

## 2023-12-19 DIAGNOSIS — M25.532 WRIST PAIN, CHRONIC, LEFT: ICD-10-CM

## 2023-12-19 DIAGNOSIS — F41.9 ANXIETY: ICD-10-CM

## 2023-12-19 PROBLEM — M79.672 PAIN OF LEFT HEEL: Status: RESOLVED | Noted: 2022-11-21 | Resolved: 2023-12-19

## 2023-12-19 LAB
ALBUMIN SERPL-MCNC: 5.3 G/DL (ref 3.5–5.2)
ALBUMIN/GLOB SERPL: 2.2 G/DL
ALP SERPL-CCNC: 60 U/L (ref 39–117)
ALT SERPL W P-5'-P-CCNC: 67 U/L (ref 1–41)
ANION GAP SERPL CALCULATED.3IONS-SCNC: 13 MMOL/L (ref 5–15)
AST SERPL-CCNC: 35 U/L (ref 1–40)
BILIRUB SERPL-MCNC: 0.6 MG/DL (ref 0–1.2)
BUN SERPL-MCNC: 18 MG/DL (ref 6–20)
BUN/CREAT SERPL: 17 (ref 7–25)
CALCIUM SPEC-SCNC: 10.2 MG/DL (ref 8.6–10.5)
CHLORIDE SERPL-SCNC: 102 MMOL/L (ref 98–107)
CHOLEST SERPL-MCNC: 184 MG/DL (ref 0–200)
CO2 SERPL-SCNC: 27 MMOL/L (ref 22–29)
CREAT SERPL-MCNC: 1.06 MG/DL (ref 0.76–1.27)
DEPRECATED RDW RBC AUTO: 40 FL (ref 37–54)
EGFRCR SERPLBLD CKD-EPI 2021: 82.4 ML/MIN/1.73
ERYTHROCYTE [DISTWIDTH] IN BLOOD BY AUTOMATED COUNT: 12.8 % (ref 12.3–15.4)
GLOBULIN UR ELPH-MCNC: 2.4 GM/DL
GLUCOSE SERPL-MCNC: 89 MG/DL (ref 65–99)
HCT VFR BLD AUTO: 47.5 % (ref 37.5–51)
HDLC SERPL-MCNC: 48 MG/DL (ref 40–60)
HGB BLD-MCNC: 16.8 G/DL (ref 13–17.7)
LDLC SERPL CALC-MCNC: 105 MG/DL (ref 0–100)
LDLC/HDLC SERPL: 2.08 {RATIO}
MCH RBC QN AUTO: 30.9 PG (ref 26.6–33)
MCHC RBC AUTO-ENTMCNC: 35.4 G/DL (ref 31.5–35.7)
MCV RBC AUTO: 87.3 FL (ref 79–97)
PLATELET # BLD AUTO: 247 10*3/MM3 (ref 140–450)
PMV BLD AUTO: 9.6 FL (ref 6–12)
POTASSIUM SERPL-SCNC: 4 MMOL/L (ref 3.5–5.2)
PROT SERPL-MCNC: 7.7 G/DL (ref 6–8.5)
PSA SERPL-MCNC: 4.92 NG/ML (ref 0–4)
RBC # BLD AUTO: 5.44 10*6/MM3 (ref 4.14–5.8)
SODIUM SERPL-SCNC: 142 MMOL/L (ref 136–145)
T4 FREE SERPL-MCNC: 1.44 NG/DL (ref 0.93–1.7)
TRIGL SERPL-MCNC: 181 MG/DL (ref 0–150)
TSH SERPL DL<=0.05 MIU/L-ACNC: 2.13 UIU/ML (ref 0.27–4.2)
VLDLC SERPL-MCNC: 31 MG/DL (ref 5–40)
WBC NRBC COR # BLD AUTO: 5.02 10*3/MM3 (ref 3.4–10.8)

## 2023-12-19 PROCEDURE — G0103 PSA SCREENING: HCPCS | Performed by: PHYSICIAN ASSISTANT

## 2023-12-19 PROCEDURE — 84439 ASSAY OF FREE THYROXINE: CPT | Performed by: PHYSICIAN ASSISTANT

## 2023-12-19 PROCEDURE — 80050 GENERAL HEALTH PANEL: CPT | Performed by: PHYSICIAN ASSISTANT

## 2023-12-19 PROCEDURE — 86803 HEPATITIS C AB TEST: CPT | Performed by: PHYSICIAN ASSISTANT

## 2023-12-19 PROCEDURE — 80061 LIPID PANEL: CPT | Performed by: PHYSICIAN ASSISTANT

## 2023-12-19 PROCEDURE — 99396 PREV VISIT EST AGE 40-64: CPT | Performed by: PHYSICIAN ASSISTANT

## 2023-12-19 PROCEDURE — 82746 ASSAY OF FOLIC ACID SERUM: CPT | Performed by: PHYSICIAN ASSISTANT

## 2023-12-19 PROCEDURE — 93000 ELECTROCARDIOGRAM COMPLETE: CPT | Performed by: PHYSICIAN ASSISTANT

## 2023-12-19 PROCEDURE — 82607 VITAMIN B-12: CPT | Performed by: PHYSICIAN ASSISTANT

## 2023-12-19 PROCEDURE — 36415 COLL VENOUS BLD VENIPUNCTURE: CPT | Performed by: PHYSICIAN ASSISTANT

## 2023-12-19 RX ORDER — DILTIAZEM HYDROCHLORIDE 240 MG/1
240 CAPSULE, COATED, EXTENDED RELEASE ORAL DAILY
Qty: 90 CAPSULE | Refills: 1 | Status: SHIPPED | OUTPATIENT
Start: 2023-12-19

## 2023-12-19 RX ORDER — DICLOFENAC SODIUM 75 MG/1
75 TABLET, DELAYED RELEASE ORAL 2 TIMES DAILY
Qty: 180 TABLET | Refills: 1 | Status: SHIPPED | OUTPATIENT
Start: 2023-12-19

## 2023-12-19 RX ORDER — SIMVASTATIN 40 MG
40 TABLET ORAL DAILY
Qty: 90 TABLET | Refills: 3 | Status: SHIPPED | OUTPATIENT
Start: 2023-12-19

## 2023-12-19 RX ORDER — CITALOPRAM 20 MG/1
20 TABLET ORAL DAILY
Qty: 90 TABLET | Refills: 3 | Status: SHIPPED | OUTPATIENT
Start: 2023-12-19

## 2023-12-19 RX ORDER — LISINOPRIL AND HYDROCHLOROTHIAZIDE 20; 12.5 MG/1; MG/1
2 TABLET ORAL DAILY
Qty: 180 TABLET | Refills: 1 | Status: SHIPPED | OUTPATIENT
Start: 2023-12-19

## 2023-12-19 RX ORDER — PANTOPRAZOLE SODIUM 40 MG/1
40 TABLET, DELAYED RELEASE ORAL DAILY
Qty: 90 TABLET | Refills: 3 | Status: SHIPPED | OUTPATIENT
Start: 2023-12-19

## 2023-12-19 NOTE — PROGRESS NOTES
Subjective   Jay Saunders is a 58 y.o. male  Annual Exam (Annual physical and labs ), Wrist Pain (Needs referral back to Dr. Olson for left wrist pain), and Shoulder Pain (Req new referral to Dr. Villalobos for ongoing right shoulder pain )    Yariel Saunders, date of birth 1965, presents today for an annual physical.    He conveys that he is doing well. He conveys that his stomach issues have resolved. His blood pressure medication is working well for him. He was seen by an endocrinologist, Dr. Chicas, in the summer of 2021 for his thyroid. He was told to follow up on his blood work. His left heel has cleared up. He has issues with his digits, mainly on his right foot. He cannot remember the provider's name that he saw for his right foot. He has seen Dr. Olson once for his left wrist. He conveys that he had x-rays done in 11/2022 and was told that it was bone on bone. It is causing him to walk on the outside of his right foot. His gait is off. He has seen Dr. Villalobos for his right shoulder. He had surgery on his right shoulder. He thinks the labrum is gone. He is having trouble with range of motion and pain. He wakes up 4 to 6 times a night. He sleeps on his back. Every time he rolls over on his right side, he wakes up because it is hurting so bad. He has had surgery on his left wrist in the past. He is not sure if the bone is sticking up. It is rubbing against the radial nerve. There are times that he bumps it a certain way and it causes pain. He conveys that it really affects his golf swing. He has never been diabetic. He conveys that he tries to drink more water than he ever did. He drinks black coffee and sweet tea when he goes out to eat. He had COVID-19 approximately 2 months ago. He has a dry cough every so often. He has not had a chest x-ray done since he had COVID-19. He does not feel short of breath. He conveys that when he had COVID it never really settled in his chest. He conveys that it is  irritating. It does affect him in any other way. He has not had any sinus issues. He conveys that his bowels are moving okay, and he is urinating okay. He gets up once or twice at night to urinate.    He has a family history of diabetes on his mother's side.     He conveys that he still has neuropathy in his bilateral feet. It has been intermittent for years. He conveys it is not at the same time. He has not changed his lifestyle. He conveys that he needs everything refilled.    The following portions of the patient's history were reviewed and updated as appropriate: allergies, current medications, past social history and problem list.    Wrist Pain   Associated symptoms include numbness.     Patient presents today for a preventive medical visit.  Patient is here to determine screening labs and tests that are due and to determine immunization status as well.  Patient will be counseled regarding preventative medicine issues such as regular exercise and healthy diet as well.  The following portions of the patient's history were reviewed and updated as appropriate: allergies, current medications, past social history and problem list    Review of Systems   Constitutional: Negative.    HENT: Negative.     Eyes: Negative.    Respiratory: Negative.     Cardiovascular: Negative.    Gastrointestinal: Negative.    Endocrine: Negative.    Genitourinary: Negative.    Musculoskeletal:  Positive for arthralgias and gait problem.   Skin: Negative.    Allergic/Immunologic: Negative.    Neurological:  Positive for numbness.   Hematological: Negative.    Psychiatric/Behavioral: Negative.     All other systems reviewed and are negative.      Objective     Vitals:    12/19/23 0941   BP: 126/78   Pulse: 73   Temp: 97.8 °F (36.6 °C)   SpO2: 97%       Physical Exam  Vitals and nursing note reviewed.   Constitutional:       General: He is not in acute distress.     Appearance: Normal appearance. He is well-developed. He is obese. He is not  ill-appearing, toxic-appearing or diaphoretic.      Comments: BMI34   HENT:      Head: Normocephalic and atraumatic.      Right Ear: External ear normal.      Left Ear: External ear normal.   Eyes:      Conjunctiva/sclera: Conjunctivae normal.      Pupils: Pupils are equal, round, and reactive to light.   Neck:      Thyroid: No thyromegaly.      Vascular: No carotid bruit.   Cardiovascular:      Rate and Rhythm: Normal rate and regular rhythm.      Pulses: Normal pulses.      Heart sounds: Normal heart sounds. No murmur heard.  Pulmonary:      Effort: Pulmonary effort is normal. No respiratory distress.      Breath sounds: Normal breath sounds.   Abdominal:      General: Bowel sounds are normal.      Palpations: Abdomen is soft. There is no mass.      Tenderness: There is no abdominal tenderness.   Musculoskeletal:         General: No swelling. Normal range of motion.      Cervical back: Normal range of motion and neck supple.   Lymphadenopathy:      Cervical: No cervical adenopathy.   Skin:     General: Skin is warm and dry.      Findings: No lesion or rash.   Neurological:      Mental Status: He is alert and oriented to person, place, and time.      Cranial Nerves: No cranial nerve deficit.      Sensory: No sensory deficit.      Motor: No weakness.      Coordination: Coordination normal.      Gait: Gait normal.      Deep Tendon Reflexes: Reflexes are normal and symmetric.   Psychiatric:         Mood and Affect: Mood normal.         Behavior: Behavior normal.         Thought Content: Thought content normal.         Judgment: Judgment normal.       ECG 12 Lead    Date/Time: 12/19/2023 10:46 AM  Performed by: Argentina Osborn PA-C    Authorized by: Argentina Osborn PA-C  Rhythm: sinus bradycardia  Rate: bradycardic  BPM: 54  Conduction: conduction normal  ST Segments: ST segments normal  T Waves: T waves normal  QRS axis: normal  Other: no other findings    Clinical impression: normal ECG          Discussed  preventative medicine issues with patient including regular exercise, healthy diet, stress reduction, adequate sleep and recommended age-appropriate screening studies.  Assessment & Plan     Diagnoses and all orders for this visit:    1. General medical exam (Primary)  -     Lipid Panel  -     TSH  -     T4, Free  -     Comprehensive metabolic panel  -     CBC (No Diff)  -     PSA Screen  -     Hepatitis C Antibody    2. Arthritis of right foot  -     Ambulatory Referral to Orthopedic Surgery    3. Chronic pain in right shoulder  -     Ambulatory Referral to Orthopedic Surgery    4. Wrist pain, chronic, left  -     Ambulatory Referral to Orthopedic Surgery    5. Prostate cancer screening  -     PSA Screen    6. Neuropathy of both feet  -     Vitamin B12  -     Folate    7. Abnormal thyroid function test    8. Anxiety  -     citalopram (CeleXA) 20 MG tablet; Take 1 tablet by mouth Daily.  Dispense: 90 tablet; Refill: 3    9. Essential hypertension, benign    10. Mixed hyperlipidemia    11. Other hyperlipidemia  -     simvastatin (ZOCOR) 40 MG tablet; Take 1 tablet by mouth Daily.  Dispense: 90 tablet; Refill: 3    12. Gastroesophageal reflux disease  -     pantoprazole (PROTONIX) 40 MG EC tablet; Take 1 tablet by mouth Daily.  Dispense: 90 tablet; Refill: 3    13. Essential hypertension  -     lisinopril-hydrochlorothiazide (PRINZIDE,ZESTORETIC) 20-12.5 MG per tablet; Take 2 tablets by mouth Daily.  Dispense: 180 tablet; Refill: 1  -     dilTIAZem CD (CARDIZEM CD) 240 MG 24 hr capsule; Take 1 capsule by mouth Daily.  Dispense: 90 capsule; Refill: 1    Other orders  -     diclofenac (VOLTAREN) 75 MG EC tablet; Take 1 tablet by mouth 2 (Two) Times a Day.  Dispense: 180 tablet; Refill: 1  -     ECG 12 Lead     1. Annual physical exam (Primary)  - LORELEI is doing well overall.   - He is up to date on his preventative care.   - He is due for a tetanus vaccine, shingles vaccine, and influenza vaccine.   - He is due for a full  thyroid panel, kidney, liver functions, electrolytes, and blood count.    2. Essential hypertension  - His blood pressure is well controlled at this time.   - He will continue his current medication regimen.    3. Hypothyroidism, unspecified type  - He will continue his current medication regimen.    4. Chronic left wrist pain  - He will be referred to orthopedics for further evaluation and treatment.    5. Chronic cough  - He will be referred to pulmonology.    6. Neuropathy  - We will recheck his B12, folate, and electrolytes. If they are completely normal, I am going to let Dr. Harvey see if he thinks we need to do a nerve conduction study on it to try to determine where it is coming from, make sure it is not an actual compressed nerve that would need to be released with surgical intervention because of whatever is going on with his arthritis and short of that, then he can start just saying there are medications that he can take to stop the sensation of the neuropathy, but he really want to figure out why on it.        Transcribed from ambient dictation for Argentina Osborn PA-C by Pablo Vargas.  12/19/23   11:03 EST

## 2023-12-20 DIAGNOSIS — R97.20 ELEVATED PSA, LESS THAN 10 NG/ML: Primary | ICD-10-CM

## 2023-12-20 LAB
FOLATE SERPL-MCNC: >20 NG/ML (ref 4.78–24.2)
HCV AB SER DONR QL: NORMAL
VIT B12 BLD-MCNC: 878 PG/ML (ref 211–946)

## 2023-12-27 ENCOUNTER — TELEPHONE (OUTPATIENT)
Dept: FAMILY MEDICINE CLINIC | Facility: CLINIC | Age: 58
End: 2023-12-27
Payer: COMMERCIAL

## 2023-12-27 NOTE — TELEPHONE ENCOUNTER
Pt needs xray of left wrist for his appointment on Jan 8th with ortho. Confirmed with patient that he last had this done at ortho, called them and the xray was done back in 2019 and will need a new xray. Please order.

## 2023-12-28 DIAGNOSIS — G89.29 CHRONIC WRIST PAIN, LEFT: Primary | ICD-10-CM

## 2023-12-28 DIAGNOSIS — M25.532 CHRONIC WRIST PAIN, LEFT: Primary | ICD-10-CM

## 2023-12-28 NOTE — TELEPHONE ENCOUNTER
Called pt and advised to have xrays done and bring the disc with him to his appointment with Dr. Olson.

## 2023-12-29 ENCOUNTER — TELEPHONE (OUTPATIENT)
Dept: FAMILY MEDICINE CLINIC | Facility: CLINIC | Age: 58
End: 2023-12-29
Payer: COMMERCIAL

## 2023-12-29 NOTE — TELEPHONE ENCOUNTER
Caller: Jay Saunders    Relationship: Self    Best call back number: [phone]    What orders are you requesting (i.e. lab or imaging): X RAY OF LEFT WRIST    In what timeframe would the patient need to come in: ASAP    [facility]    Additional notes: PATIENT NEEDS ORDER SENT TO [facility] DUE TO INSURANCE PURPOSES. PLEASE CALL PATIENT WHEN THIS HAS BEEN SENT

## 2023-12-29 NOTE — TELEPHONE ENCOUNTER
Spoke with patient and advised this had been faxed to [facility] per his request.

## 2024-01-03 ENCOUNTER — OFFICE VISIT (OUTPATIENT)
Dept: ORTHOPEDIC SURGERY | Facility: CLINIC | Age: 59
End: 2024-01-03
Payer: COMMERCIAL

## 2024-01-03 VITALS
SYSTOLIC BLOOD PRESSURE: 138 MMHG | HEIGHT: 70 IN | DIASTOLIC BLOOD PRESSURE: 82 MMHG | WEIGHT: 242.51 LBS | BODY MASS INDEX: 34.72 KG/M2

## 2024-01-03 DIAGNOSIS — M20.21 HALLUX RIGIDUS OF BOTH FEET: ICD-10-CM

## 2024-01-03 DIAGNOSIS — M20.22 HALLUX RIGIDUS OF BOTH FEET: ICD-10-CM

## 2024-01-03 DIAGNOSIS — M79.671 RIGHT FOOT PAIN: Primary | ICD-10-CM

## 2024-01-03 RX ORDER — BUPIVACAINE HYDROCHLORIDE 5 MG/ML
1 INJECTION, SOLUTION EPIDURAL; INTRACAUDAL
Status: COMPLETED | OUTPATIENT
Start: 2024-01-03 | End: 2024-01-03

## 2024-01-03 RX ORDER — TRIAMCINOLONE ACETONIDE 40 MG/ML
0.5 INJECTION, SUSPENSION INTRA-ARTICULAR; INTRAMUSCULAR
Status: COMPLETED | OUTPATIENT
Start: 2024-01-03 | End: 2024-01-03

## 2024-01-03 RX ADMIN — TRIAMCINOLONE ACETONIDE 0.5 ML: 40 INJECTION, SUSPENSION INTRA-ARTICULAR; INTRAMUSCULAR at 09:06

## 2024-01-03 RX ADMIN — BUPIVACAINE HYDROCHLORIDE 1 ML: 5 INJECTION, SOLUTION EPIDURAL; INTRACAUDAL at 09:06

## 2024-01-03 NOTE — PROGRESS NOTES
Procedure   - Small Joint Arthrocentesis: R great MTP on 1/3/2024 9:06 AM  Indications: pain  Details: 22 G needle, ultrasound-guided lateral approach  Medications: 0.5 mL triamcinolone acetonide 40 MG/ML; 1 mL bupivacaine (PF) 0.5 %  Outcome: tolerated well, no immediate complications  Procedure, treatment alternatives, risks and benefits explained, specific risks discussed. Consent was given by the patient. Immediately prior to procedure a time out was called to verify the correct patient, procedure, equipment, support staff and site/side marked as required. Patient was prepped and draped in the usual sterile fashion.

## 2024-01-03 NOTE — PROGRESS NOTES
"                          [facility] Follow Up     Office Follow Up Visit     Date: 01/03/2024   Patient Name: Jay Saunders  MRN: [id number]  YOB: 1965  Chief Complaint:   Chief Complaint   Patient presents with    Follow-up     Hallux rigidus of both feet, right foot worse      History of Present Illness:   Jay Saunders is a 58 y.o. male who is here today for follow up for right great toe pain.  Last seen in clinic November 2022.  States heel pain is resolved.  Here because his right great toe pain is continue to worsen.  States walking on the lateral part of his foot due to pain.    Subjective   I reviewed the patient's chief complaint, history of present illness, review of systems, past medical history, surgical history, family history, social history, medications and allergy list   Objective    Vital Signs:   Vitals:    01/03/24 0824   BP: 138/82   Weight: 110 kg (242 lb 8.1 oz)   Height: 177.8 cm (70\")     Body mass index is 34.8 kg/m².    Ortho Exam:  There is limited motion of the first MTP joint on the right, there is pain with mid arc passive ROM.  Palpable overlying osteophyte.  Some tenderness at the joint line.    Results Review:  US Guided Injection  Ultrasound-guided corticosteroid injection of the right first MTP joint  XR Foot 3+ View Right  Right Foot X-Ray 01/03/24   Indication: Pain  Views: 3 weight bearing , comparison to previous  Findings: xrays reviewed by me today in the office and show   redemonstration advanced first MTP arthritis with osteophyte formation, no   other acute osseous abnormality       Assessment / Plan    Assessment/Plan:   Diagnoses and all orders for this visit:    1. Right foot pain (Primary)  -     XR Foot 3+ View Right  -     US Guided Injection    2. Hallux rigidus of both feet    Other orders  -     - Small Joint Arthrocentesis: R great MTP      We once again discussed patient's first MTP arthritis which has been present for " over a year causing pain that has progressed (a chronic illness with exacerbation). Decision regarding surgical intervention considered. Patient is not a candidate due to continued trial of nonoperative management. I explained this is arthritis of the big toe MTP joint.  We once again discussed his treatment options.  1. Carbon fiber foot plate and accommodative shoes  2. NSAIDs (OTC Aleve, Advil, etc) when painful I also recommended voltaren gel   3. Cortisone injection - the injection can be done every 3 months    Patient has tried [employer] shoes as well as carbon fiber insoles.  Yet his symptoms have persisted.  After long discussion patient elected for steroid injection in clinic today.  Discussed with patient he can have this injection up to every 3 months but if it lasts longer than that to wait as long as he can but based on his symptoms.  We also discussed surgical management which for him I would likely recommend an MTP fusion surgery.  Patient is going to see how things go after the injection and follow-up as needed.  It was a pleasure seeing him today.       Follow Up:   Return if symptoms worsen or fail to improve.      Nathanael Harvey MD  [facility]

## 2024-01-04 ENCOUNTER — TELEPHONE (OUTPATIENT)
Dept: FAMILY MEDICINE CLINIC | Facility: CLINIC | Age: 59
End: 2024-01-04
Payer: COMMERCIAL

## 2024-01-04 NOTE — TELEPHONE ENCOUNTER
Spoke to patient and advised that they did not receive the fax the first time we sent it and that we had resubmitted it recently. Advised for him to call them so he can get scheduled.

## 2024-01-04 NOTE — TELEPHONE ENCOUNTER
----- Message from Jay Saunders sent at 1/4/2024 11:12 AM EST -----  Regarding: referral to a Neurologist  Contact: [phone]  during my appointment with Dr. Harvey, we spoke about the neuropathy in my feet.   he stated to get with you to get an appointment with a Neurologist.  could you please  submit a referral ?        During my last appointment we had discussed the referrals to several specialist.  all of them have been made with the exception of Dr. Villalobos for my shoulder.   could you please ask for an appointment again?       thank you very much    Tj

## 2024-01-04 NOTE — TELEPHONE ENCOUNTER
Last seen 12/19/2023, referral to Dr. Villalobos was already placed 12/19, Blanca, can you check on this and notify the patient?    Patient also asking for referral to neurology for neuropathy recommended by Dr. Harvey

## 2024-01-22 ENCOUNTER — OFFICE VISIT (OUTPATIENT)
Dept: UROLOGY | Facility: CLINIC | Age: 59
End: 2024-01-22
Payer: COMMERCIAL

## 2024-01-22 VITALS — OXYGEN SATURATION: 97 % | BODY MASS INDEX: 34.65 KG/M2 | HEART RATE: 79 BPM | HEIGHT: 70 IN | WEIGHT: 242 LBS

## 2024-01-22 DIAGNOSIS — N40.1 BPH WITH OBSTRUCTION/LOWER URINARY TRACT SYMPTOMS: ICD-10-CM

## 2024-01-22 DIAGNOSIS — N13.8 BPH WITH OBSTRUCTION/LOWER URINARY TRACT SYMPTOMS: ICD-10-CM

## 2024-01-22 DIAGNOSIS — R97.20 ELEVATED PROSTATE SPECIFIC ANTIGEN (PSA): ICD-10-CM

## 2024-01-22 PROCEDURE — 99204 OFFICE O/P NEW MOD 45 MIN: CPT | Performed by: STUDENT IN AN ORGANIZED HEALTH CARE EDUCATION/TRAINING PROGRAM

## 2024-01-22 RX ORDER — TAMSULOSIN HYDROCHLORIDE 0.4 MG/1
1 CAPSULE ORAL DAILY
Qty: 90 CAPSULE | Refills: 1 | Status: SHIPPED | OUTPATIENT
Start: 2024-01-22

## 2024-01-23 ENCOUNTER — LAB (OUTPATIENT)
Dept: LAB | Facility: HOSPITAL | Age: 59
End: 2024-01-23
Payer: COMMERCIAL

## 2024-01-23 DIAGNOSIS — R97.20 ELEVATED PROSTATE SPECIFIC ANTIGEN (PSA): ICD-10-CM

## 2024-01-23 LAB
CREAT SERPL-MCNC: 1.05 MG/DL (ref 0.76–1.27)
EGFRCR SERPLBLD CKD-EPI 2021: 83.3 ML/MIN/1.73

## 2024-01-23 PROCEDURE — 82565 ASSAY OF CREATININE: CPT

## 2024-01-23 PROCEDURE — 36415 COLL VENOUS BLD VENIPUNCTURE: CPT

## 2024-01-30 ENCOUNTER — TELEPHONE (OUTPATIENT)
Dept: UROLOGY | Facility: CLINIC | Age: 59
End: 2024-01-30
Payer: COMMERCIAL

## 2024-01-30 DIAGNOSIS — R97.20 ELEVATED PROSTATE SPECIFIC ANTIGEN (PSA): Primary | ICD-10-CM

## 2024-01-30 NOTE — TELEPHONE ENCOUNTER
Patient was schedule for prostate MRI today but because he has a hip replacement, MRI techs refused to do the imaging.  I was told off hand by radiology prior to ordering his MRI, this hip replacement should not be much of an issue.  I will reach out to radiology to confirm their policy.  In the interim he will see me back on 2-12-24 as scheduled with a repeat PSA prior.  If his PSA returns to normal we will probably continue monitoring his PSA, if his PSA still high I would recommend select MDX urine test at that time.    Ananth San MD

## 2024-01-30 NOTE — TELEPHONE ENCOUNTER
Caller: [facility]    Best call back number: [phone] (PT)    What was the call regarding: CALLING BECAUSE PT IS CURRENTLY THERE FOR AN MRI. SAYS THEY CANNOT DO MRI BECAUSE PT HAS HIP REPLACEMENT.     PT NEEDS TO KNOW WHAT HIS NEXT STEP IS

## 2024-02-02 ENCOUNTER — TRANSCRIBE ORDERS (OUTPATIENT)
Dept: GENERAL RADIOLOGY | Facility: HOSPITAL | Age: 59
End: 2024-02-02
Payer: COMMERCIAL

## 2024-02-02 ENCOUNTER — TRANSCRIBE ORDERS (OUTPATIENT)
Dept: LAB | Facility: HOSPITAL | Age: 59
End: 2024-02-02
Payer: COMMERCIAL

## 2024-02-02 ENCOUNTER — LAB (OUTPATIENT)
Dept: LAB | Facility: HOSPITAL | Age: 59
End: 2024-02-02
Payer: COMMERCIAL

## 2024-02-02 ENCOUNTER — HOSPITAL ENCOUNTER (OUTPATIENT)
Dept: CARDIOLOGY | Facility: HOSPITAL | Age: 59
Discharge: HOME | End: 2024-02-02
Payer: COMMERCIAL

## 2024-02-02 ENCOUNTER — HOSPITAL ENCOUNTER (OUTPATIENT)
Dept: GENERAL RADIOLOGY | Facility: HOSPITAL | Age: 59
Discharge: HOME | End: 2024-02-02
Payer: COMMERCIAL

## 2024-02-02 ENCOUNTER — TRANSCRIBE ORDERS (OUTPATIENT)
Dept: CARDIOLOGY | Facility: HOSPITAL | Age: 59
End: 2024-02-02
Payer: COMMERCIAL

## 2024-02-02 DIAGNOSIS — Z87.898 PERSONAL HISTORY OF UNSPECIFIED DISEASE: ICD-10-CM

## 2024-02-02 DIAGNOSIS — Z01.818 OTHER SPECIFIED PRE-OPERATIVE EXAMINATION: ICD-10-CM

## 2024-02-02 DIAGNOSIS — Z01.818 PREOP EXAMINATION: ICD-10-CM

## 2024-02-02 DIAGNOSIS — Z01.818 PREOP EXAMINATION: Primary | ICD-10-CM

## 2024-02-02 DIAGNOSIS — R73.09 IMPAIRED GLUCOSE TOLERANCE TEST: ICD-10-CM

## 2024-02-02 DIAGNOSIS — Z87.898 PERSONAL HISTORY OF UNSPECIFIED DISEASE: Primary | ICD-10-CM

## 2024-02-02 DIAGNOSIS — R97.20 ELEVATED PROSTATE SPECIFIC ANTIGEN (PSA): ICD-10-CM

## 2024-02-02 LAB
ANION GAP SERPL CALCULATED.3IONS-SCNC: 12 MMOL/L (ref 5–15)
BUN SERPL-MCNC: 17 MG/DL (ref 6–20)
BUN/CREAT SERPL: 20.7 (ref 7–25)
CALCIUM SPEC-SCNC: 9.3 MG/DL (ref 8.6–10.5)
CHLORIDE SERPL-SCNC: 103 MMOL/L (ref 98–107)
CO2 SERPL-SCNC: 28 MMOL/L (ref 22–29)
CREAT SERPL-MCNC: 0.82 MG/DL (ref 0.76–1.27)
DEPRECATED RDW RBC AUTO: 39.8 FL (ref 37–54)
EGFRCR SERPLBLD CKD-EPI 2021: 103.1 ML/MIN/1.73
ERYTHROCYTE [DISTWIDTH] IN BLOOD BY AUTOMATED COUNT: 12.7 % (ref 12.3–15.4)
GLUCOSE SERPL-MCNC: 82 MG/DL (ref 65–99)
HBA1C MFR BLD: 5.3 % (ref 4.8–5.6)
HCT VFR BLD AUTO: 46.1 % (ref 37.5–51)
HGB BLD-MCNC: 16.1 G/DL (ref 13–17.7)
MCH RBC QN AUTO: 30.4 PG (ref 26.6–33)
MCHC RBC AUTO-ENTMCNC: 34.9 G/DL (ref 31.5–35.7)
MCV RBC AUTO: 87 FL (ref 79–97)
PLATELET # BLD AUTO: 212 10*3/MM3 (ref 140–450)
PMV BLD AUTO: 8.7 FL (ref 6–12)
POTASSIUM SERPL-SCNC: 3.8 MMOL/L (ref 3.5–5.2)
RBC # BLD AUTO: 5.3 10*6/MM3 (ref 4.14–5.8)
SODIUM SERPL-SCNC: 143 MMOL/L (ref 136–145)
WBC NRBC COR # BLD AUTO: 5.06 10*3/MM3 (ref 3.4–10.8)

## 2024-02-02 PROCEDURE — 36415 COLL VENOUS BLD VENIPUNCTURE: CPT

## 2024-02-02 PROCEDURE — 85027 COMPLETE CBC AUTOMATED: CPT

## 2024-02-02 PROCEDURE — 84154 ASSAY OF PSA FREE: CPT

## 2024-02-02 PROCEDURE — 84153 ASSAY OF PSA TOTAL: CPT

## 2024-02-02 PROCEDURE — 93005 ELECTROCARDIOGRAM TRACING: CPT | Performed by: ORTHOPAEDIC SURGERY

## 2024-02-02 PROCEDURE — 83036 HEMOGLOBIN GLYCOSYLATED A1C: CPT

## 2024-02-02 PROCEDURE — 80048 BASIC METABOLIC PNL TOTAL CA: CPT

## 2024-02-02 PROCEDURE — 71046 X-RAY EXAM CHEST 2 VIEWS: CPT

## 2024-02-03 LAB
PSA FREE MFR SERPL: 18.2 %
PSA FREE SERPL-MCNC: 0.89 NG/ML
PSA SERPL-MCNC: 4.9 NG/ML (ref 0–4)
QT INTERVAL: 426 MS
QTC INTERVAL: 414 MS

## 2024-02-12 ENCOUNTER — OFFICE VISIT (OUTPATIENT)
Dept: UROLOGY | Facility: CLINIC | Age: 59
End: 2024-02-12
Payer: COMMERCIAL

## 2024-02-12 VITALS — WEIGHT: 242 LBS | OXYGEN SATURATION: 98 % | HEART RATE: 75 BPM | BODY MASS INDEX: 34.65 KG/M2 | HEIGHT: 70 IN

## 2024-02-12 DIAGNOSIS — R97.20 ELEVATED PROSTATE SPECIFIC ANTIGEN (PSA): Primary | ICD-10-CM

## 2024-02-12 PROCEDURE — 99213 OFFICE O/P EST LOW 20 MIN: CPT | Performed by: STUDENT IN AN ORGANIZED HEALTH CARE EDUCATION/TRAINING PROGRAM

## 2024-02-21 ENCOUNTER — TELEPHONE (OUTPATIENT)
Dept: UROLOGY | Facility: CLINIC | Age: 59
End: 2024-02-21
Payer: COMMERCIAL

## 2024-02-21 DIAGNOSIS — R97.20 ELEVATED PROSTATE SPECIFIC ANTIGEN (PSA): Primary | ICD-10-CM

## 2024-02-21 NOTE — TELEPHONE ENCOUNTER
"Patient has a history of elevated and rising PSA.  Unable to obtain MRI due to hip arthroplasties.  We proceeded with select MDX urinary screening performed after VIKAS at last office visit.  I called him with results of select MDX urine screening, this came back \"very low risk\".  This indicates a very small chance of undiagnosed clinically significant prostate cancer and provides reassurance that it will be safe to continue trending his PSA.  I would like to bring him back in 6 months, sometime in August with a PSA lab to be performed a few days prior.  Patient reports understanding.  I did discuss with the patient that despite low risk select urine screening, if his PSA continues rising at next visit we will need to consider upfront prostate biopsy.  He reports understanding.  Most likely etiology for his PSA increase is BPH vs smoldering prostatitis, given enlarged prostate on VIKAS and moderate urinary symptoms.    PLAN  - Urology staff, please have pt return to clinic in 6 months with repeat PSA (free% and total), ordered    Ananth San MD    "

## 2024-07-11 DIAGNOSIS — N13.8 BPH WITH OBSTRUCTION/LOWER URINARY TRACT SYMPTOMS: ICD-10-CM

## 2024-07-11 DIAGNOSIS — N40.1 BPH WITH OBSTRUCTION/LOWER URINARY TRACT SYMPTOMS: ICD-10-CM

## 2024-07-11 DIAGNOSIS — I10 ESSENTIAL HYPERTENSION: ICD-10-CM

## 2024-07-11 RX ORDER — LISINOPRIL AND HYDROCHLOROTHIAZIDE 20; 12.5 MG/1; MG/1
2 TABLET ORAL DAILY
Qty: 180 TABLET | Refills: 0 | Status: SHIPPED | OUTPATIENT
Start: 2024-07-11

## 2024-07-11 RX ORDER — DICLOFENAC SODIUM 75 MG/1
75 TABLET, DELAYED RELEASE ORAL 2 TIMES DAILY
Qty: 180 TABLET | Refills: 0 | Status: SHIPPED | OUTPATIENT
Start: 2024-07-11

## 2024-07-11 RX ORDER — TAMSULOSIN HYDROCHLORIDE 0.4 MG/1
1 CAPSULE ORAL DAILY
Qty: 90 CAPSULE | Refills: 0 | Status: SHIPPED | OUTPATIENT
Start: 2024-07-11

## 2024-07-11 NOTE — TELEPHONE ENCOUNTER
Rx Refill Note  Requested Prescriptions     Pending Prescriptions Disp Refills    tamsulosin (FLOMAX) 0.4 MG capsule 24 hr capsule [Pharmacy Med Name: Tamsulosin HCl 0.4 MG Oral Capsule] 90 capsule 0     Sig: Take 1 capsule by mouth once daily      Last office visit with prescribing clinician: 2/12/2024   Next office visit with prescribing clinician: 8/29/2024       Larissa Ramos MA  07/11/24, 07:51 EDT

## 2024-07-28 DIAGNOSIS — I10 ESSENTIAL HYPERTENSION: ICD-10-CM

## 2024-07-29 RX ORDER — DILTIAZEM HYDROCHLORIDE 240 MG/1
240 CAPSULE, COATED, EXTENDED RELEASE ORAL DAILY
Qty: 90 CAPSULE | Refills: 0 | Status: SHIPPED | OUTPATIENT
Start: 2024-07-29

## 2024-08-16 ENCOUNTER — LAB (OUTPATIENT)
Facility: HOSPITAL | Age: 59
End: 2024-08-16
Payer: COMMERCIAL

## 2024-08-16 DIAGNOSIS — R97.20 ELEVATED PROSTATE SPECIFIC ANTIGEN (PSA): ICD-10-CM

## 2024-08-16 PROCEDURE — 36415 COLL VENOUS BLD VENIPUNCTURE: CPT

## 2024-08-16 PROCEDURE — 84154 ASSAY OF PSA FREE: CPT

## 2024-08-16 PROCEDURE — 84153 ASSAY OF PSA TOTAL: CPT

## 2024-08-19 LAB
PSA FREE MFR SERPL: 18.3 %
PSA FREE SERPL-MCNC: 0.99 NG/ML
PSA SERPL-MCNC: 5.4 NG/ML (ref 0–4)

## 2024-09-05 ENCOUNTER — OFFICE VISIT (OUTPATIENT)
Dept: UROLOGY | Facility: CLINIC | Age: 59
End: 2024-09-05
Payer: COMMERCIAL

## 2024-09-05 VITALS — DIASTOLIC BLOOD PRESSURE: 77 MMHG | SYSTOLIC BLOOD PRESSURE: 128 MMHG | OXYGEN SATURATION: 95 % | HEART RATE: 71 BPM

## 2024-09-05 DIAGNOSIS — R97.20 ELEVATED PROSTATE SPECIFIC ANTIGEN (PSA): Primary | ICD-10-CM

## 2024-09-05 DIAGNOSIS — N40.1 BPH WITH OBSTRUCTION/LOWER URINARY TRACT SYMPTOMS: ICD-10-CM

## 2024-09-05 DIAGNOSIS — N13.8 BPH WITH OBSTRUCTION/LOWER URINARY TRACT SYMPTOMS: ICD-10-CM

## 2024-09-05 RX ORDER — TAMSULOSIN HYDROCHLORIDE 0.4 MG/1
1 CAPSULE ORAL DAILY
Qty: 90 CAPSULE | Refills: 3 | Status: SHIPPED | OUTPATIENT
Start: 2024-09-05

## 2024-09-05 NOTE — PROGRESS NOTES
"     Follow Up Office Visit      Patient Name: Jay Saunders  : 1965   MRN: [id number]     Chief Complaint:    Chief Complaint   Patient presents with    Elevated PSA       Referring Provider: No ref. provider found    History of Present Illness: Jay Saunders is a 59 y.o. male who presents today for follow up of elevated PSA.  The patient's PSA has been rising over the last few years albeit slowly.  He establish care with me in 2024.  PSA in 2022 was 2.9.  In 2023 it ricki to 4.9.  Rectal examination was benign.  He is unable to obtain MRI prostate secondary to hip arthroplasty.  Patient underwent prostate massage with select MDX urinary screening, this resulted in 2024 at \"very low risk\".  Risk of undiagnosed prostate cancer being extremely low we continued PSA surveillance.  He returns today for 7-month follow-up with repeat PSA which has risen somewhat.    Lab Results   Component Value Date    PSA 5.4 (H) 2024    PSA 4.9 (H) 2024    PSA 4.920 (H) 2023    PSA 2.980 2022    PSA 2.140 2021    PSA 2.200 10/27/2016     He has a history of BPH managing with tamsulosin.  Free PSA percentage is 18 indicating a less than 70% chance of undiagnosed prostate cancer based on nomogram.  He has no additional complaints today.  Feels that Flomax is working well.  IPSS score 17 with a mixed quality of life.    Subjective      Review of System: Review of Systems   Genitourinary:  Positive for difficulty urinating and frequency.      I have reviewed the ROS documented by my clinical staff, I have updated appropriately and I agree. Ananth San MD    I have reviewed and the following portions of the patient's history were updated as appropriate: past family history, past medical history, past social history, past surgical history and problem list.    Medications:     Current Outpatient Medications:     citalopram (CeleXA) 20 MG tablet, Take 1 tablet " by mouth Daily., Disp: 90 tablet, Rfl: 3    diclofenac (VOLTAREN) 75 MG EC tablet, Take 1 tablet by mouth twice daily, Disp: 180 tablet, Rfl: 0    dilTIAZem CD (CARDIZEM CD) 240 MG 24 hr capsule, Take 1 capsule by mouth once daily, Disp: 90 capsule, Rfl: 0    lisinopril-hydrochlorothiazide (PRINZIDE,ZESTORETIC) 20-12.5 MG per tablet, Take 2 tablets by mouth once daily, Disp: 180 tablet, Rfl: 0    ondansetron ODT (ZOFRAN-ODT) 8 MG disintegrating tablet, Place 1 tablet on the tongue Every 8 (Eight) Hours As Needed for Nausea or Vomiting., Disp: 20 tablet, Rfl: 0    pantoprazole (PROTONIX) 40 MG EC tablet, Take 1 tablet by mouth Daily., Disp: 90 tablet, Rfl: 3    simvastatin (ZOCOR) 40 MG tablet, Take 1 tablet by mouth Daily., Disp: 90 tablet, Rfl: 3    tamsulosin (FLOMAX) 0.4 MG capsule 24 hr capsule, Take 1 capsule by mouth Daily., Disp: 90 capsule, Rfl: 3    tiZANidine (ZANAFLEX) 2 MG tablet, Take 1 tablet by mouth Every 8 (Eight) Hours As Needed for Muscle Spasms., Disp: 30 tablet, Rfl: 1    Allergies:   No Known Allergies    IPSS Questionnaire (AUA-7):  Over the past month…    1)  Incomplete Emptying:       How often have you had a sensation of not emptying you had the sensation of not emptying your bladder completely after you finished urinating?  3 - About half the time   2)  Frequency:       How often have you had the urinate again less than two hours after you finished urinating?  3 - About half the time   3)  Intermittency:       How often have you found you stopped and started again several times when you urinated?   1 - Less than 1 time in 5   4) Urgency:      How often have you found it difficult to postpone urination?  2 - Less than half the time   5) Weak Stream:      How often have you had a weak urinary stream?  5 - Almost always   6) Straining:       How often have you had to push or strain to begin urination?  1 - Less than 1 time in 5   7) Nocturia:      How many times did you most typically get up  to urinate from the time you went to bed at night until the time you got up in the morning?  2 - 2 times   Total Score:  17   The International Prostate Symptom Score (IPSS) is used to screen, diagnose, track symptoms of benign prostatic hyperplasia (BPH).   0-7 (Mild Symptoms) 8-19 (Moderate) 20-35 (Severe)   Quality of Life (QoL):  If you were to spend the rest of your life with your urinary condition just the way it is now, how would you feel about that? 3-Mixed   Urine Leakage (Incontinence) 0-No Leakage       Objective     Physical Exam:   Vital Signs:   Vitals:    09/05/24 1402   BP: 128/77   Pulse: 71   SpO2: 95%     There is no height or weight on file to calculate BMI.     Physical Exam  Constitutional:       Appearance: Normal appearance.   HENT:      Head: Normocephalic and atraumatic.      Nose: Nose normal.   Eyes:      Extraocular Movements: Extraocular movements intact.      Conjunctiva/sclera: Conjunctivae normal.      Pupils: Pupils are equal, round, and reactive to light.   Musculoskeletal:         General: Normal range of motion.      Cervical back: Normal range of motion and neck supple.   Skin:     General: Skin is warm and dry.      Findings: No lesion or rash.   Neurological:      General: No focal deficit present.      Mental Status: He is alert and oriented to person, place, and time. Mental status is at baseline.   Psychiatric:         Mood and Affect: Mood normal.         Behavior: Behavior normal.         Labs:   Brief Urine Lab Results       None                 Lab Results   Component Value Date    GLUCOSE 82 02/02/2024    CALCIUM 9.3 02/02/2024     02/02/2024    K 3.8 02/02/2024    CO2 28.0 02/02/2024     02/02/2024    BUN 17 02/02/2024    CREATININE 0.82 02/02/2024    EGFRIFNONA 91 03/31/2021    BCR 20.7 02/02/2024    ANIONGAP 12.0 02/02/2024       Lab Results   Component Value Date    WBC 5.06 02/02/2024    HGB 16.1 02/02/2024    HCT 46.1 02/02/2024    MCV 87.0 02/02/2024      02/02/2024       Images:   No Images in the past 120 days found..    Measures:   Tobacco:   Jay Saunders  reports that he quit smoking about 28 years ago. His smoking use included cigarettes. He started smoking about 39 years ago. He has a 10.8 pack-year smoking history. He has never used smokeless tobacco.    Assessment / Plan      Assessment/Plan:   59 y.o. male who presented today for follow up of mildly elevated and slightly rising PSA.  Select MDX urinary biomarker screening came back very low risk for undiagnosed cancer potential.  I discussed options with patient.  His PSA velocity is fairly slow and he is slightly above the normal range for his age and therefore we would recommend either proceed with prostate biopsy versus continued PSA monitoring.  He elects for continued PSA monitoring.  Repeat PSA with free percentage in 6 months.  If continued rise or rapid velocity change we will consider in clinic prostate biopsy.  He reports understanding.  Continue Flomax for BPH.    Diagnoses and all orders for this visit:    1. Elevated prostate specific antigen (PSA) (Primary)  -     PSA Total+% Free (Serial); Future    2. BPH with obstruction/lower urinary tract symptoms  -     tamsulosin (FLOMAX) 0.4 MG capsule 24 hr capsule; Take 1 capsule by mouth Daily.  Dispense: 90 capsule; Refill: 3           Follow Up:   Return in about 6 months (around 3/5/2025) for Follow Up after Labs.    I spent approximately 30 minutes providing clinical care for this patient; including review of patient's chart and provider documentation, face to face time spent with patient in examination room (obtaining history, performing physical exam, discussing diagnosis and management options), placing orders, and completing patient documentation.     Ananth San MD  [facility] [place]

## 2024-10-07 DIAGNOSIS — I10 ESSENTIAL HYPERTENSION: ICD-10-CM

## 2024-10-07 RX ORDER — LISINOPRIL AND HYDROCHLOROTHIAZIDE 12.5; 2 MG/1; MG/1
2 TABLET ORAL DAILY
Qty: 180 TABLET | Refills: 0 | Status: SHIPPED | OUTPATIENT
Start: 2024-10-07

## 2024-10-07 RX ORDER — DICLOFENAC SODIUM 75 MG/1
75 TABLET, DELAYED RELEASE ORAL 2 TIMES DAILY
Qty: 180 TABLET | Refills: 0 | Status: SHIPPED | OUTPATIENT
Start: 2024-10-07

## 2024-10-27 DIAGNOSIS — I10 ESSENTIAL HYPERTENSION: ICD-10-CM

## 2024-10-29 RX ORDER — DILTIAZEM HYDROCHLORIDE 240 MG/1
240 CAPSULE, EXTENDED RELEASE ORAL DAILY
Qty: 90 CAPSULE | Refills: 0 | Status: SHIPPED | OUTPATIENT
Start: 2024-10-29

## 2024-10-30 ENCOUNTER — OFFICE VISIT (OUTPATIENT)
Dept: ORTHOPEDIC SURGERY | Facility: CLINIC | Age: 59
End: 2024-10-30
Payer: COMMERCIAL

## 2024-10-30 ENCOUNTER — PREP FOR SURGERY (OUTPATIENT)
Dept: OTHER | Facility: HOSPITAL | Age: 59
End: 2024-10-30
Payer: COMMERCIAL

## 2024-10-30 VITALS
BODY MASS INDEX: 35.82 KG/M2 | DIASTOLIC BLOOD PRESSURE: 78 MMHG | SYSTOLIC BLOOD PRESSURE: 128 MMHG | WEIGHT: 250.2 LBS | HEIGHT: 70 IN

## 2024-10-30 DIAGNOSIS — M20.22 HALLUX RIGIDUS OF BOTH FEET: Primary | ICD-10-CM

## 2024-10-30 DIAGNOSIS — M20.21 HALLUX RIGIDUS OF BOTH FEET: Primary | ICD-10-CM

## 2024-10-30 NOTE — PROGRESS NOTES
"                          [facility] Follow Up     Office Follow Up Visit     Date: 10/30/2024   Patient Name: Jay Saunders  MRN: [id number]  YOB: 1965  Chief Complaint:   Chief Complaint   Patient presents with    Follow-up     9 month follow up--Hallux rigidus of both feet       History of Present Illness:   Jay Saunders is a 59 y.o. male who is here today for follow up for right hallux rigidus.  Last seen in clinic on January 3.  Received steroid injection at that point in time which provided relief for about 2 to 2-1/2 weeks.  States did fill out provide relief of his pain however then it returned.  States feels as though he has tried several different conservative options including NSAIDs, accommodative shoe wear, injections yet his symptoms has persisted.  Would like to discuss surgery.    Subjective   I reviewed the patient's chief complaint, history of present illness, review of systems, past medical history, surgical history, family history, social history, medications and allergy list   Objective    Vital Signs:   Vitals:    10/30/24 1310   BP: 128/78   Weight: 113 kg (250 lb 3.2 oz)   Height: 177.8 cm (70\")     Body mass index is 35.9 kg/m².    Ortho Exam:  right LE Foot and Ankle Exam:   Normal gait pattern. Hindfoot alignment is neutral. Plantigrade foot.   There is good perfusion to the toes.  Palpable DP pulse.  The skin is intact throughout the foot and ankle without ulceration.   Range of motion of ankle, subtalar joint, midfoot within normal limits.   There is limited motion of the first MTP joint, there is pain with mid arc passive ROM.      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 Hallux rigidus of both feet (Primary)      Patient with pain secondary to first MTP arthritis that has been present for over a year and pain has continued to increase (chronic illness with progression). Decision regarding surgical intervention considered. Patient is a candidate due to failure of non-operative management. I explained the MTPJ fusion surgery. I explained the stiffness in the toe, patients usually walk well after the surgery but they cannot go on tip-toe, crouch, wear heels, and they go down stairs flat-footed. At most, they will be able to wear a 1 inch wedge heel. The goal of the surgery is a stiff straight toe with less pain. Most patients report about 80% reduction in pain.      I explained the permanent plate and screws and that all foot and ankle surgery swells far longer than any other surgery because it is below the heart. I explained the post-op expectations. With swelling it may be 4-5 months to get a shoe back on. I explained it really takes a full year to see the end results of the surgery.  Plan for surgery will be December 3.    Plan for surgery will be right first MTP fusion, Calc autograft harvest, all other indicated procedures.    The risks and benefits of the procedure were discussed with the patient and or appropriate guardian, which include but are not limited to the risk of bleeding, infection, neurovascular damage, post-operative stiffness, continued pain, arthritic pain, need for further revision surgeries in the future, deep venous thrombosis, and general risks from anesthesia. We also discussed the post-operative rehabilitation, the need for physical therapy, and the overall expected outcomes from the procedure. We allowed proper time and answered the patient's questions regarding the procedure. Knowing what the risks are and what the conservative treatment is, the patient elected to forgo any further  conservative treatment options and proceed with the surgical intervention.        Follow Up:   Return in about 2 weeks (around 11/13/2024) for F/U after Surgery.      Nathanael Harvey MD  [facility]

## 2024-11-27 ENCOUNTER — LAB (OUTPATIENT)
Dept: LAB | Facility: HOSPITAL | Age: 59
End: 2024-11-27
Payer: COMMERCIAL

## 2024-11-27 DIAGNOSIS — M20.22 HALLUX RIGIDUS OF BOTH FEET: ICD-10-CM

## 2024-11-27 DIAGNOSIS — M20.21 HALLUX RIGIDUS OF BOTH FEET: ICD-10-CM

## 2024-11-27 LAB
ANION GAP SERPL CALCULATED.3IONS-SCNC: 11 MMOL/L (ref 5–15)
BASOPHILS # BLD AUTO: 0.05 10*3/MM3 (ref 0–0.2)
BASOPHILS NFR BLD AUTO: 1 % (ref 0–1.5)
BUN SERPL-MCNC: 17 MG/DL (ref 6–20)
BUN/CREAT SERPL: 17.2 (ref 7–25)
CALCIUM SPEC-SCNC: 9.5 MG/DL (ref 8.6–10.5)
CHLORIDE SERPL-SCNC: 104 MMOL/L (ref 98–107)
CO2 SERPL-SCNC: 28 MMOL/L (ref 22–29)
CREAT SERPL-MCNC: 0.99 MG/DL (ref 0.76–1.27)
DEPRECATED RDW RBC AUTO: 42.7 FL (ref 37–54)
EGFRCR SERPLBLD CKD-EPI 2021: 88.9 ML/MIN/1.73
EOSINOPHIL # BLD AUTO: 0.14 10*3/MM3 (ref 0–0.4)
EOSINOPHIL NFR BLD AUTO: 2.7 % (ref 0.3–6.2)
ERYTHROCYTE [DISTWIDTH] IN BLOOD BY AUTOMATED COUNT: 12.9 % (ref 12.3–15.4)
GLUCOSE SERPL-MCNC: 90 MG/DL (ref 65–99)
HCT VFR BLD AUTO: 48.1 % (ref 37.5–51)
HGB BLD-MCNC: 15.9 G/DL (ref 13–17.7)
IMM GRANULOCYTES # BLD AUTO: 0.01 10*3/MM3 (ref 0–0.05)
IMM GRANULOCYTES NFR BLD AUTO: 0.2 % (ref 0–0.5)
LYMPHOCYTES # BLD AUTO: 1.64 10*3/MM3 (ref 0.7–3.1)
LYMPHOCYTES NFR BLD AUTO: 31.4 % (ref 19.6–45.3)
MCH RBC QN AUTO: 29.9 PG (ref 26.6–33)
MCHC RBC AUTO-ENTMCNC: 33.1 G/DL (ref 31.5–35.7)
MCV RBC AUTO: 90.6 FL (ref 79–97)
MONOCYTES # BLD AUTO: 0.74 10*3/MM3 (ref 0.1–0.9)
MONOCYTES NFR BLD AUTO: 14.1 % (ref 5–12)
NEUTROPHILS NFR BLD AUTO: 2.65 10*3/MM3 (ref 1.7–7)
NEUTROPHILS NFR BLD AUTO: 50.6 % (ref 42.7–76)
NRBC BLD AUTO-RTO: 0 /100 WBC (ref 0–0.2)
PLATELET # BLD AUTO: 241 10*3/MM3 (ref 140–450)
PMV BLD AUTO: 9.9 FL (ref 6–12)
POTASSIUM SERPL-SCNC: 3.6 MMOL/L (ref 3.5–5.2)
RBC # BLD AUTO: 5.31 10*6/MM3 (ref 4.14–5.8)
SODIUM SERPL-SCNC: 143 MMOL/L (ref 136–145)
WBC NRBC COR # BLD AUTO: 5.23 10*3/MM3 (ref 3.4–10.8)

## 2024-11-27 PROCEDURE — 80048 BASIC METABOLIC PNL TOTAL CA: CPT

## 2024-11-27 PROCEDURE — 85025 COMPLETE CBC W/AUTO DIFF WBC: CPT

## 2024-11-27 PROCEDURE — 36415 COLL VENOUS BLD VENIPUNCTURE: CPT

## 2024-12-02 ENCOUNTER — ANESTHESIA EVENT (OUTPATIENT)
Dept: PERIOP | Facility: HOSPITAL | Age: 59
End: 2024-12-02
Payer: COMMERCIAL

## 2024-12-03 ENCOUNTER — APPOINTMENT (OUTPATIENT)
Dept: GENERAL RADIOLOGY | Facility: HOSPITAL | Age: 59
End: 2024-12-03
Payer: COMMERCIAL

## 2024-12-03 ENCOUNTER — HOSPITAL ENCOUNTER (OUTPATIENT)
Facility: HOSPITAL | Age: 59
Setting detail: HOSPITAL OUTPATIENT SURGERY
Discharge: HOME | End: 2024-12-03
Attending: ORTHOPAEDIC SURGERY | Admitting: ORTHOPAEDIC SURGERY
Payer: COMMERCIAL

## 2024-12-03 ENCOUNTER — ANESTHESIA (OUTPATIENT)
Dept: PERIOP | Facility: HOSPITAL | Age: 59
End: 2024-12-03
Payer: COMMERCIAL

## 2024-12-03 ENCOUNTER — ANESTHESIA EVENT CONVERTED (OUTPATIENT)
Dept: ANESTHESIOLOGY | Facility: HOSPITAL | Age: 59
End: 2024-12-03
Payer: COMMERCIAL

## 2024-12-03 VITALS
TEMPERATURE: 98 F | SYSTOLIC BLOOD PRESSURE: 127 MMHG | HEIGHT: 70 IN | DIASTOLIC BLOOD PRESSURE: 78 MMHG | RESPIRATION RATE: 14 BRPM | HEART RATE: 81 BPM | OXYGEN SATURATION: 96 % | BODY MASS INDEX: 34.36 KG/M2 | WEIGHT: 240 LBS

## 2024-12-03 DIAGNOSIS — M20.21 HALLUX RIGIDUS OF BOTH FEET: ICD-10-CM

## 2024-12-03 DIAGNOSIS — M20.21 HALLUX RIGIDUS OF RIGHT FOOT: Primary | ICD-10-CM

## 2024-12-03 DIAGNOSIS — M20.22 HALLUX RIGIDUS OF BOTH FEET: ICD-10-CM

## 2024-12-03 PROCEDURE — 28292 COR HLX VLGS RSC PRX PHLX BS: CPT | Performed by: ORTHOPAEDIC SURGERY

## 2024-12-03 PROCEDURE — 25810000003 LACTATED RINGERS PER 1000 ML: Performed by: ANESTHESIOLOGY

## 2024-12-03 PROCEDURE — C1713 ANCHOR/SCREW BN/BN,TIS/BN: HCPCS | Performed by: ORTHOPAEDIC SURGERY

## 2024-12-03 PROCEDURE — 25010000002 PROPOFOL 10 MG/ML EMULSION: Performed by: NURSE ANESTHETIST, CERTIFIED REGISTERED

## 2024-12-03 PROCEDURE — 25010000002 LIDOCAINE PF 1% 1 % SOLUTION: Performed by: NURSE ANESTHETIST, CERTIFIED REGISTERED

## 2024-12-03 PROCEDURE — 25010000002 LIDOCAINE PF 1% 1 % SOLUTION: Performed by: ANESTHESIOLOGY

## 2024-12-03 PROCEDURE — 20902 REMOVAL OF BONE FOR GRAFT: CPT | Performed by: ORTHOPAEDIC SURGERY

## 2024-12-03 PROCEDURE — 25010000002 ONDANSETRON PER 1 MG: Performed by: NURSE ANESTHETIST, CERTIFIED REGISTERED

## 2024-12-03 PROCEDURE — 25010000002 GLYCOPYRROLATE 1 MG/5ML SOLUTION: Performed by: NURSE ANESTHETIST, CERTIFIED REGISTERED

## 2024-12-03 PROCEDURE — 25010000002 DEXAMETHASONE SODIUM PHOSPHATE 10 MG/ML SOLUTION: Performed by: NURSE ANESTHETIST, CERTIFIED REGISTERED

## 2024-12-03 PROCEDURE — 25010000002 BUPIVACAINE (PF) 0.25 % SOLUTION: Performed by: NURSE ANESTHETIST, CERTIFIED REGISTERED

## 2024-12-03 PROCEDURE — 25010000002 FENTANYL CITRATE (PF) 50 MCG/ML SOLUTION: Performed by: NURSE ANESTHETIST, CERTIFIED REGISTERED

## 2024-12-03 PROCEDURE — 25010000002 CEFAZOLIN PER 500 MG: Performed by: ORTHOPAEDIC SURGERY

## 2024-12-03 PROCEDURE — P9041 ALBUMIN (HUMAN),5%, 50ML: HCPCS | Performed by: NURSE ANESTHETIST, CERTIFIED REGISTERED

## 2024-12-03 PROCEDURE — 76000 FLUOROSCOPY <1 HR PHYS/QHP: CPT

## 2024-12-03 PROCEDURE — 76000 FLUOROSCOPY <1 HR PHYS/QHP: CPT | Performed by: ORTHOPAEDIC SURGERY

## 2024-12-03 PROCEDURE — 25010000002 ALBUMIN HUMAN 5% PER 50 ML: Performed by: NURSE ANESTHETIST, CERTIFIED REGISTERED

## 2024-12-03 PROCEDURE — 28750 FUSION OF BIG TOE JOINT: CPT | Performed by: ORTHOPAEDIC SURGERY

## 2024-12-03 PROCEDURE — 25010000002 SUGAMMADEX 200 MG/2ML SOLUTION: Performed by: NURSE ANESTHETIST, CERTIFIED REGISTERED

## 2024-12-03 PROCEDURE — 25010000002 DEXAMETHASONE PER 1 MG: Performed by: NURSE ANESTHETIST, CERTIFIED REGISTERED

## 2024-12-03 PROCEDURE — 25010000002 ROPIVACAINE HCL-NACL 0.2-0.9 % SOLUTION: Performed by: NURSE ANESTHETIST, CERTIFIED REGISTERED

## 2024-12-03 DEVICE — 2.7 X 20 MM R3CON LOCKING PLATE SCREW
Type: IMPLANTABLE DEVICE | Site: FOOT | Status: FUNCTIONAL
Brand: GORILLA PLATING SYSTEM

## 2024-12-03 DEVICE — 2.7 X 16 MM R3CON LOCKING PLATE SCREW
Type: IMPLANTABLE DEVICE | Site: FOOT | Status: FUNCTIONAL
Brand: GORILLA PLATING SYSTEM

## 2024-12-03 DEVICE — 2.7 X 18 MM R3CON LOCKING PLATE SCREW
Type: IMPLANTABLE DEVICE | Site: FOOT | Status: FUNCTIONAL
Brand: GORILLA PLATING SYSTEM

## 2024-12-03 DEVICE — MINI MONSTER HEADED, SHORT THREAD, 3.5 X 28MM
Type: IMPLANTABLE DEVICE | Site: FOOT | Status: FUNCTIONAL
Brand: MONSTER SCREW SYSTEM

## 2024-12-03 DEVICE — 2.7 X 17 MM R3CON NON-LOCKING PLATE SCREW
Type: IMPLANTABLE DEVICE | Site: FOOT | Status: FUNCTIONAL
Brand: GORILLA PLATING SYSTEM

## 2024-12-03 DEVICE — MTP, 5 DEGREE, MEDIUM PLATE, RIGHT
Type: IMPLANTABLE DEVICE | Site: FOOT | Status: FUNCTIONAL
Brand: GORILLA PLATING SYSTEM

## 2024-12-03 RX ORDER — ROCURONIUM BROMIDE 10 MG/ML
INJECTION, SOLUTION INTRAVENOUS AS NEEDED
Status: DISCONTINUED | OUTPATIENT
Start: 2024-12-03 | End: 2024-12-03 | Stop reason: SURG

## 2024-12-03 RX ORDER — BUPIVACAINE HCL/0.9 % NACL/PF 0.125 %
PLASTIC BAG, INJECTION (ML) EPIDURAL AS NEEDED
Status: DISCONTINUED | OUTPATIENT
Start: 2024-12-03 | End: 2024-12-03 | Stop reason: SURG

## 2024-12-03 RX ORDER — HYDROMORPHONE HYDROCHLORIDE 1 MG/ML
0.5 INJECTION, SOLUTION INTRAMUSCULAR; INTRAVENOUS; SUBCUTANEOUS
Status: DISCONTINUED | OUTPATIENT
Start: 2024-12-03 | End: 2024-12-03 | Stop reason: HOSPADM

## 2024-12-03 RX ORDER — FAMOTIDINE 10 MG/ML
20 INJECTION, SOLUTION INTRAVENOUS ONCE
Status: DISCONTINUED | OUTPATIENT
Start: 2024-12-03 | End: 2024-12-03 | Stop reason: HOSPADM

## 2024-12-03 RX ORDER — ONDANSETRON 2 MG/ML
INJECTION INTRAMUSCULAR; INTRAVENOUS AS NEEDED
Status: DISCONTINUED | OUTPATIENT
Start: 2024-12-03 | End: 2024-12-03 | Stop reason: SURG

## 2024-12-03 RX ORDER — FENTANYL CITRATE 50 UG/ML
INJECTION, SOLUTION INTRAMUSCULAR; INTRAVENOUS
Status: COMPLETED | OUTPATIENT
Start: 2024-12-03 | End: 2024-12-03

## 2024-12-03 RX ORDER — ONDANSETRON 4 MG/1
4 TABLET, FILM COATED ORAL EVERY 8 HOURS PRN
Qty: 15 TABLET | Refills: 0 | Status: SHIPPED | OUTPATIENT
Start: 2024-12-03

## 2024-12-03 RX ORDER — GLYCOPYRROLATE 0.2 MG/ML
INJECTION INTRAMUSCULAR; INTRAVENOUS AS NEEDED
Status: DISCONTINUED | OUTPATIENT
Start: 2024-12-03 | End: 2024-12-03 | Stop reason: SURG

## 2024-12-03 RX ORDER — FAMOTIDINE 20 MG/1
20 TABLET, FILM COATED ORAL ONCE
Status: COMPLETED | OUTPATIENT
Start: 2024-12-03 | End: 2024-12-03

## 2024-12-03 RX ORDER — ALBUTEROL SULFATE 90 UG/1
INHALANT RESPIRATORY (INHALATION) AS NEEDED
Status: DISCONTINUED | OUTPATIENT
Start: 2024-12-03 | End: 2024-12-03 | Stop reason: SURG

## 2024-12-03 RX ORDER — IPRATROPIUM BROMIDE AND ALBUTEROL SULFATE 2.5; .5 MG/3ML; MG/3ML
3 SOLUTION RESPIRATORY (INHALATION) ONCE AS NEEDED
Status: DISCONTINUED | OUTPATIENT
Start: 2024-12-03 | End: 2024-12-03 | Stop reason: HOSPADM

## 2024-12-03 RX ORDER — LIDOCAINE HYDROCHLORIDE 10 MG/ML
0.5 INJECTION, SOLUTION EPIDURAL; INFILTRATION; INTRACAUDAL; PERINEURAL ONCE AS NEEDED
Status: COMPLETED | OUTPATIENT
Start: 2024-12-03 | End: 2024-12-03

## 2024-12-03 RX ORDER — DEXAMETHASONE SODIUM PHOSPHATE 10 MG/ML
INJECTION, SOLUTION INTRAMUSCULAR; INTRAVENOUS
Status: COMPLETED | OUTPATIENT
Start: 2024-12-03 | End: 2024-12-03

## 2024-12-03 RX ORDER — DROPERIDOL 2.5 MG/ML
0.62 INJECTION, SOLUTION INTRAMUSCULAR; INTRAVENOUS ONCE AS NEEDED
Status: DISCONTINUED | OUTPATIENT
Start: 2024-12-03 | End: 2024-12-03 | Stop reason: HOSPADM

## 2024-12-03 RX ORDER — MAGNESIUM HYDROXIDE 1200 MG/15ML
LIQUID ORAL AS NEEDED
Status: DISCONTINUED | OUTPATIENT
Start: 2024-12-03 | End: 2024-12-03 | Stop reason: HOSPADM

## 2024-12-03 RX ORDER — LIDOCAINE HYDROCHLORIDE 10 MG/ML
INJECTION, SOLUTION EPIDURAL; INFILTRATION; INTRACAUDAL; PERINEURAL AS NEEDED
Status: DISCONTINUED | OUTPATIENT
Start: 2024-12-03 | End: 2024-12-03 | Stop reason: SURG

## 2024-12-03 RX ORDER — BUPIVACAINE HYDROCHLORIDE 2.5 MG/ML
INJECTION, SOLUTION EPIDURAL; INFILTRATION; INTRACAUDAL
Status: COMPLETED | OUTPATIENT
Start: 2024-12-03 | End: 2024-12-03

## 2024-12-03 RX ORDER — SODIUM CHLORIDE 0.9 % (FLUSH) 0.9 %
10 SYRINGE (ML) INJECTION AS NEEDED
Status: DISCONTINUED | OUTPATIENT
Start: 2024-12-03 | End: 2024-12-03 | Stop reason: HOSPADM

## 2024-12-03 RX ORDER — PROPOFOL 10 MG/ML
VIAL (ML) INTRAVENOUS AS NEEDED
Status: DISCONTINUED | OUTPATIENT
Start: 2024-12-03 | End: 2024-12-03 | Stop reason: SURG

## 2024-12-03 RX ORDER — FENTANYL CITRATE 50 UG/ML
50 INJECTION, SOLUTION INTRAMUSCULAR; INTRAVENOUS
Status: DISCONTINUED | OUTPATIENT
Start: 2024-12-03 | End: 2024-12-03 | Stop reason: HOSPADM

## 2024-12-03 RX ORDER — MIDAZOLAM HYDROCHLORIDE 1 MG/ML
1 INJECTION, SOLUTION INTRAMUSCULAR; INTRAVENOUS
Status: DISCONTINUED | OUTPATIENT
Start: 2024-12-03 | End: 2024-12-03 | Stop reason: HOSPADM

## 2024-12-03 RX ORDER — ASPIRIN 81 MG/1
81 TABLET ORAL DAILY
Qty: 30 TABLET | Refills: 0 | Status: SHIPPED | OUTPATIENT
Start: 2024-12-03 | End: 2025-01-02

## 2024-12-03 RX ORDER — SODIUM CHLORIDE, SODIUM LACTATE, POTASSIUM CHLORIDE, CALCIUM CHLORIDE 600; 310; 30; 20 MG/100ML; MG/100ML; MG/100ML; MG/100ML
9 INJECTION, SOLUTION INTRAVENOUS CONTINUOUS
Status: DISCONTINUED | OUTPATIENT
Start: 2024-12-04 | End: 2024-12-03 | Stop reason: HOSPADM

## 2024-12-03 RX ORDER — HYDROCODONE BITARTRATE AND ACETAMINOPHEN 5; 325 MG/1; MG/1
1 TABLET ORAL EVERY 4 HOURS PRN
Qty: 40 TABLET | Refills: 0 | Status: SHIPPED | OUTPATIENT
Start: 2024-12-03

## 2024-12-03 RX ORDER — DEXAMETHASONE SODIUM PHOSPHATE 4 MG/ML
INJECTION, SOLUTION INTRA-ARTICULAR; INTRALESIONAL; INTRAMUSCULAR; INTRAVENOUS; SOFT TISSUE AS NEEDED
Status: DISCONTINUED | OUTPATIENT
Start: 2024-12-03 | End: 2024-12-03 | Stop reason: SURG

## 2024-12-03 RX ORDER — DOCUSATE SODIUM 100 MG/1
100 CAPSULE, LIQUID FILLED ORAL 2 TIMES DAILY
Qty: 30 CAPSULE | Refills: 0 | Status: SHIPPED | OUTPATIENT
Start: 2024-12-03 | End: 2024-12-18

## 2024-12-03 RX ORDER — ALBUMIN HUMAN 50 G/1000ML
SOLUTION INTRAVENOUS CONTINUOUS PRN
Status: DISCONTINUED | OUTPATIENT
Start: 2024-12-03 | End: 2024-12-03 | Stop reason: SURG

## 2024-12-03 RX ORDER — ROPIVACAINE HYDROCHLORIDE 2 MG/ML
INJECTION, SOLUTION EPIDURAL; INFILTRATION; PERINEURAL CONTINUOUS
Status: DISCONTINUED | OUTPATIENT
Start: 2024-12-03 | End: 2024-12-03 | Stop reason: HOSPADM

## 2024-12-03 RX ORDER — SODIUM CHLORIDE 0.9 % (FLUSH) 0.9 %
10 SYRINGE (ML) INJECTION EVERY 12 HOURS SCHEDULED
Status: DISCONTINUED | OUTPATIENT
Start: 2024-12-03 | End: 2024-12-03 | Stop reason: HOSPADM

## 2024-12-03 RX ADMIN — Medication 100 MCG: at 10:11

## 2024-12-03 RX ADMIN — Medication 100 MCG: at 10:15

## 2024-12-03 RX ADMIN — GLYCOPYRROLATE 0.2 MG: 0.2 INJECTION, SOLUTION INTRAMUSCULAR; INTRAVENOUS at 10:25

## 2024-12-03 RX ADMIN — BUPIVACAINE HYDROCHLORIDE 30 ML: 2.5 INJECTION, SOLUTION EPIDURAL; INFILTRATION; INTRACAUDAL; PERINEURAL at 08:40

## 2024-12-03 RX ADMIN — DEXAMETHASONE SODIUM PHOSPHATE 2 MG: 10 INJECTION, SOLUTION INTRAMUSCULAR; INTRAVENOUS at 08:29

## 2024-12-03 RX ADMIN — Medication 200 MCG: at 10:33

## 2024-12-03 RX ADMIN — ALBUMIN (HUMAN): 12.5 INJECTION, SOLUTION INTRAVENOUS at 10:33

## 2024-12-03 RX ADMIN — PROPOFOL 200 MG: 10 INJECTION, EMULSION INTRAVENOUS at 09:57

## 2024-12-03 RX ADMIN — SUGAMMADEX 200 MG: 100 INJECTION, SOLUTION INTRAVENOUS at 12:42

## 2024-12-03 RX ADMIN — ALBUTEROL SULFATE 4 PUFF: 90 AEROSOL, METERED RESPIRATORY (INHALATION) at 12:57

## 2024-12-03 RX ADMIN — SODIUM CHLORIDE, POTASSIUM CHLORIDE, SODIUM LACTATE AND CALCIUM CHLORIDE: 600; 310; 30; 20 INJECTION, SOLUTION INTRAVENOUS at 10:31

## 2024-12-03 RX ADMIN — Medication 200 MCG: at 11:15

## 2024-12-03 RX ADMIN — SODIUM CHLORIDE 2 G: 900 INJECTION INTRAVENOUS at 10:04

## 2024-12-03 RX ADMIN — LIDOCAINE HYDROCHLORIDE 0.5 ML: 10 INJECTION, SOLUTION EPIDURAL; INFILTRATION; INTRACAUDAL; PERINEURAL at 07:51

## 2024-12-03 RX ADMIN — BUPIVACAINE HYDROCHLORIDE 30 ML: 2.5 INJECTION, SOLUTION EPIDURAL; INFILTRATION; INTRACAUDAL at 08:29

## 2024-12-03 RX ADMIN — ONDANSETRON 4 MG: 2 INJECTION INTRAMUSCULAR; INTRAVENOUS at 12:42

## 2024-12-03 RX ADMIN — Medication 200 MCG: at 10:24

## 2024-12-03 RX ADMIN — DEXAMETHASONE SODIUM PHOSPHATE 8 MG: 4 INJECTION INTRA-ARTICULAR; INTRALESIONAL; INTRAMUSCULAR; INTRAVENOUS; SOFT TISSUE at 09:57

## 2024-12-03 RX ADMIN — LIDOCAINE HYDROCHLORIDE 50 MG: 10 INJECTION, SOLUTION EPIDURAL; INFILTRATION; INTRACAUDAL; PERINEURAL at 09:57

## 2024-12-03 RX ADMIN — FAMOTIDINE 20 MG: 20 TABLET, FILM COATED ORAL at 07:51

## 2024-12-03 RX ADMIN — ROCURONIUM BROMIDE 50 MG: 10 INJECTION INTRAVENOUS at 09:57

## 2024-12-03 RX ADMIN — FENTANYL CITRATE 100 MCG: 50 INJECTION, SOLUTION INTRAMUSCULAR; INTRAVENOUS at 08:29

## 2024-12-03 RX ADMIN — GLYCOPYRROLATE 0.2 MG: 0.2 INJECTION, SOLUTION INTRAMUSCULAR; INTRAVENOUS at 10:39

## 2024-12-03 RX ADMIN — SODIUM CHLORIDE, POTASSIUM CHLORIDE, SODIUM LACTATE AND CALCIUM CHLORIDE 9 ML/HR: 600; 310; 30; 20 INJECTION, SOLUTION INTRAVENOUS at 07:52

## 2024-12-03 RX ADMIN — Medication 200 MCG: at 12:27

## 2024-12-03 RX ADMIN — Medication 1000 MG: at 13:09

## 2024-12-03 NOTE — ANESTHESIA PROCEDURE NOTES
Peripheral Block    Pre-sedation assessment completed: 12/3/2024 8:18 AM    Patient reassessed immediately prior to procedure    Start time: 12/3/2024 8:26 AM  Stop time: 12/3/2024 8:29 AM  Reason for block: at surgeon's request and post-op pain management  Performed by  CRNA/CAA: Javi Allison, CRNA  Assisted by: Kelsey Savage RN  Preanesthetic Checklist  Completed: patient identified, IV checked, site marked, risks and benefits discussed, surgical consent, monitors and equipment checked, pre-op evaluation and timeout performed  Prep:  Pt Position: supine  Sterile barriers:cap, gloves, mask, sterile barriers and washed/disinfected hands  Prep: ChloraPrep  Patient monitoring: blood pressure monitoring, continuous pulse oximetry and EKG  Procedure  Performed under: spinal  Guidance:ultrasound guided    ULTRASOUND INTERPRETATION.  Using ultrasound guidance a 20 G gauge needle was placed in close proximity to the nerve, at which point, under ultrasound guidance anesthetic was injected in the area of the nerve and spread of the anesthesia was seen on ultrasound in close proximity thereto.  There were no abnormalities seen on ultrasound; a digital image was taken; and the patient tolerated the procedure with no complications. Images:still images obtained, printed/placed on chart    Laterality:right  Block Type:adductor canal block  Injection Technique:single-shot  Needle Type:echogenic and short-bevel  Needle Gauge:20 G  Resistance on Injection: none  Catheter size: 20g.    Medications Used: bupivacaine PF (MARCAINE) 0.25 % injection - Injection   30 mL - 12/3/2024 8:29:00 AM  fentaNYL citrate (PF) (SUBLIMAZE) injection - Intravenous   100 mcg - 12/3/2024 8:29:00 AM  dexamethasone sodium phosphate injection - Injection   2 mg - 12/3/2024 8:29:00 AM      Post Assessment  Injection Assessment: negative aspiration for heme, incremental injection and no paresthesia on injection  Patient Tolerance:comfortable throughout  "block  Complications:no  Additional Notes  SINGLE shot   A high-frequency linear transducer, with sterile cover, was placed on the anterior mid-thigh (between the anterior superior iliac spine and patella). The transducer was then moved medially to identify the Sartorius muscle (Nuria), Vastus Medialis muscle (VMM), Superficial Femoral Artery (SFA) and Vein. The transducer was then moved cephalad or caudad to position the SFA in the middle of the Nuria. The insertion site was prepped and draped in sterile fashion. Skin and cutaneous tissue was infiltrated with 2-5 ml of 1% Lidocaine. Using ultrasound-guidance, a 20-gauge B-Wheeler 4\" Ultraplex 360 non-stimulating echogenic needle was advanced in plane from lateral to medial. Preservative-free normal saline was utilized for hydro-dissection of tissue, advancement of needle, and to confirm needle placement below the fascial plane of the Nuria where the Nerve to the VMM is located. Local anesthetic (LA) 5 ml deposited here. The needle continues its path lateral to the SFA at the level of the Saphenous Nerve. The remainder of the LA was deposited at the 10-11 o'clock position of the SFA. This injection created a space between the Nuria and the SFA. Aspiration every 5 ml to prevent intravascular injection. Injection was completed with negative aspiration of blood and negative intravascular injection. Injection pressures were normal with minimal resistance.   Performed by: Javi Allison CRNA            "

## 2024-12-03 NOTE — CASE MANAGEMENT/SOCIAL WORK
Continued Stay Note  [facility]     Patient Name: Jay Saunders  MRN: [id number]  Today's Date: 12/3/2024    Admit Date: 12/3/2024        Discharge Plan       Row Name 12/03/24 1430       Plan    Plan Comments Pt has been provided with a rolling walker at bedside from [facility].                   Discharge Codes    No documentation.                 Expected Discharge Date and Time       Expected Discharge Date Expected Discharge Time    Dec 3, 2024               MARÍA Mishra

## 2024-12-03 NOTE — DISCHARGE INSTRUCTIONS
Nathanael Harvey MD  Orthopedic Foot & Ankle Surgeon  [facility]    Diagnosis: Hallux rigidus of both feet  Procedure Performed: Procedure(s) (LRB):  Right foot first MTP fusion (Right)    What to Expect After Surgery  Diet after surgery:  Resume your diet gradually.  Begin with clear liquids and gradually progress as you feel ready.  Surgical Site Care:  Please remain in your post-operative dressing/splint until your first post op appointment with Dr. Harvey.  Please make sure to keep your post-operative dressing clean and dry.  Please use a shower bag (e.g., [email or web address]) when showering or bathing to keep things dry. Wet padding can cause skin breakdown.  Do not stick anything down your cast or splint.  If you sustain a scratch, you are at higher risk for infection as casts and splints harbor more bacteria.  Follow Up Appointment: Please call the [facility] at [phone] or Dr. Harvey's schedulers within two (2) days of your discharge to /confirm/verify your follow-up appointment date/time with Dr. Harvey.  Typically, Dr. Harvey will want to see you 14-21 days after surgery for a post-operative follow-up appointment - before 14 days, the sutures may have to stay in and you will need to return in the 14-21 day window again.  Please arrive ~15 minutes prior to your appointment time to take care of any paperwork.      Activity Precautions:  Elevate the leg above your heart as much as possible for the first two weeks after surgery.  If the leg is higher than your heart, gravity helps decrease swelling, decrease pain, and improve blood flow.  If the leg is below your heart, swelling increases, pain can worsen, and your wound is at greater risk of infection.  Keep all weight off of your surgical leg until cleared by your physician.  Use ice packs intermittently (20 minutes on, 20 minutes off) to reduce pain and swelling, however, use extreme caution with icing  if your block is still in effect.  Make sure to have a barrier between your skin and the ice pack to avoid frost bite.   If you are in a post-operative splint, you can wiggle your toes to help push swelling to your lymph ducts, but do not try to move your ankle.      Pain Management  Nerve Blocks:  to help control pain after surgery, you may have received a nerve block where the anesthesiologist injected numbing medication around the nerves that send pain signals from your foot or ankle to your brain.  This helps you feel little to no pain.   The time a nerve block lasts varies from person to person.  Often, they will last between 12 and 24 hours but could last days.  You may not be able to move your foot and/or ankle during this time.  As the block medication wears off, you may feel a tingling sensation as the nerves start to wake up.  Keep your foot and ankle safe while it is numb.  Avoid excessive heating,  scratching, etc. until the block has completely worn off.  If icing the ankle or foot, watch the toes closely to ensure that they do not become discolored (i.e., white, red or blue)  Even if you still feel no pain in your leg before you go to bed, take a dose of your narcotic medication before you go to sleep.  You do not want to wake up with the block having worn off and being behind on pain control - it is quite difficult to 'catch up' again.  Pain Medication:    Acetaminophen (Tylenol) 500 mg tablets are typically your baseline pain medication.  Take this tablet up to once every 4 hours. Do not exceed 3,000 mg of acetaminophen daily.  You have been provided Norco tablets as your initial narcotic pain reliever. Take doses of this medication if you are having breakthrough pain uncontrolled by the Tylenol alone. Norco tablets have Tylenol in them, so do not take Norco simultaneously with a plain Tylenol tablet.  Instead, substitute a Norco dose for a Tylenol dose if you need the extra pain coverage.  Note that  it takes about 30-45 minutes for oral pain medication to take effect.  DO NOT drink alcoholic beverages, use recreational drugs, or use prescription sedative medications when taking pain medication.  DO NOT operate heavy machinery/drive while taking opioids.  To avoid the risk of nausea, please make sure that you are taking your medication with food.  You may experience light headedness while taking your pain medication.  Make sure you drink plenty of water while taking narcotic pain medication to stay well hydrated and help avoid constipation.    You have been provided a Docusate prescription to help with constipation that can be a side effect of taking narcotics.  Take that medication as needed.  You have been provided a Zofran prescription to help with nausea that you can take as needed.  Itching is a common side effect to pain medication usage.  If you have an associated rash with the itching, please contact your provider.       When to Call Your Physician  Common or Normal Post-Operative Reactions:  Low grade fever (approximately 100.5 degrees) for up to one week.  Small amount of blood or fluid leaking from the surgical site or dressing  Bruising along the surgical extremity  Swelling around the surgical site and surrounding area  The reactions listed above are NORMAL, but you want to call your surgeon if any of these reactions persist.  Symptoms to Report:  Please report any of the following signs to your surgeon:  Signs of infection:  Redness or pain around your incision (if you cannot see your incision, red streaking up your extremity should be reported).  Thick, dark yellow or foul-smelling drainage at the incision site or from your dressing.  Temperature measured over 101.5 degrees for more than 24 hours despite Tylenol.  Signs of Decreased Circulation to the Ankle and Foot:  Coldness of ankle and foot  Foot or leg turning pale  Tingling/numbness (after the block has fully resolved)  Sustained increases  in pain uncontrolled by medication  Toenail bed turns blue in color  Blood Clots:  Although rare, please be aware and utilize the recommendations below to avoid getting a blood clot.  Prevention of deep vein thrombus DVT (blood clots):  You have been given a prescription for daily Aspirin to help prevent blood clot formation.  Get up 4-5 times during the daytime and walk/crutch/walker across the room to keep the blood circulating in your legs. (Maintain any weightbearing restrictions, of course)  Wiggle your toes frequently if you are in a splint or case  Notify your physician if you are a nicotine user, on hormone replacement therapy medications, are taking birth control, or have a history of blood clots as these can increase your risk of developing a blood clot.  Notify your provider if you develop pain or tenderness in your calf muscle    If you have any additional questions, please contact Dr. Harvey's staff the [facility] at [phone]    IF YOU HAVE AN EMERGENCY, i.e., SHORTNESS OF BREATH, CHEST PAIN, OR SYMPTOMS SEVERE IN NATURE, PLEASE CALL 911 OR VISIT YOUR LOCAL EMERGENCY ROOM InfuBLOCK - Patient Information    What is a pain pump?  The InfuBLOCK pump delivers post-operative, non-narcotic, numbing medication to the nerve near the surgical site for pain relief.     Where can I find information about my pain pump?           For more information about your pain pump, scan the QR code.  For additional patient resources, visit [email or web address].                                                                                               While your physician is your primary source for information about your treatment there may be times during your treatment that you need assistance with your infusion pump.     If you need assistance take the following steps:    The [facility] Hotline is Here for You 24/7.  Please call [phone] for  the following concerns or complications:    Answers to questions about your infusion pump                 Tubing disconnect  Assistance with pump alarms                                                      Dislodged catheter  Excessive leakage noted from pump                                         Inadequate pain control    2.   [facility] Anesthesia Acute Pain Service: [phone] is available 24/7 for any further needs or concerns about medication or pain control.     -------------------------------------------------------------------------    Nerve Catheter Removal Instructions  When your device is empty:    Remove your catheter by pulling the dressing off slowly (like you would remove a regular bandage). The catheter should pull right out of the skin.  Check that the BLUE tip is intact.                                                                                     If the catheter is stuck, reposition your   extremity and pull slowly until removed.  *If catheter is HURTING and WON'T come out, stop and call [phone] for further assistance.    Remove medication bag from the black carrying case.  Cut the tubing on right and left side of pump, and discard the medication bag and tubing into garbage.  Place the pump and black carrying case into the plastic bag and then place this into the return box.  Seal box with blue stickers and return to [place] postal service. THIS IS PRE-PAID POSTAGE.        -------------------------------------------------------------------------    [facility] COLD THERAPY - PATIENT INSTRUCTION SHEET    Cold Compression Therapy for your comfort and rehabilitation  Your caregivers want you to be productive in your rehab and comfortable during your stay. In keeping with those goals, you will be receiving an [facility] Cold Therapy Wrap to help ease post-operative pain and swelling that might keep you from getting back on track! Your SMI Cold Therapy Wrap is effective and simple-to-use, and you  will be encouraged to apply it throughout your hospital stay and at home through the duration of your recovery.    When you are ready to go home  Be sure to take your SMI Cold Therapy Wrap and both sets of Gel Bags with you for continued comfort and use throughout your rehabilitation. If you don't already have them, ask your nurse or aide to retrieve your SMI Gel Bags from the patient freezer.    Home use precautions  Always follow your medical professional's application instructions upon discharge. Your SMI Cold Therapy Wrap and Gel Bags are designed to last for months following your surgery. Never heat the Gel Bags unless specified by your healthcare provider. Supervision is advised when using this product on children or geriatric patients. To avoid danger of suffocation, please keep the outer plastic packaging away from children & pets.    Cold Therapy Instructions  Place Gel Bags in a freezer set ¾ of the way to max temperature for at least (4) hours. For best results, lay the Gel Bags flat and [id number] in the freezer. Once frozen, slide Gel Bags into the gel pouch and secure your wrap to the affected area with the straps.  Gel wraps that have been stored in a freezer for an extended period of time may require a (10) minute period of softening up in a room temperature environment before application.  The gel pouch acts as a protective barrier. NEVER place frozen bags directly onto skin, as this may cause frostbite injury.  The SMI Cold Therapy Wrap is designed to be able to be worm while ambulating. The compression straps can be secured well enough so that the Wrap won't fall off while moving.  Wrap Application Videos can be viewed at [email or web address].  An additional protective barrier such as clothing, a washcloth, hand-towel or pillowcase may be used during prolonged treatment applications.  The Gel-Pouch and Wrap are both Latex-Free and the Gel Bag ingredients are non toxic.    SMI Wrap care  instructions  The [facility] Cold Therapy Wrap may be hand washed and hung to dry when needed.    [facility] re-order information  Additional [facility] body specific wraps and/or Gel Bags can be re-ordered from [email or web address] or call [phone] ([phone])        Nerve Catheter Removal Instructions  When your device is empty:    Remove your catheter by pulling the dressing off slowly (like you would remove a regular bandage). The catheter should pull right out of the skin.  Check that the BLUE tip is intact.                                                                                     If the catheter is stuck, reposition your   extremity and pull slowly until removed.  *If catheter is HURTING and WON'T come out, stop and call [phone] for further assistance.    Remove medication bag from the black carrying case.  Cut the tubing on right and left side of pump, and discard the medication bag and tubing into garbage.  Place the pump and black carrying case into the plastic bag and then place this into the return box.  Seal box with blue stickers and return to [place] postal service. THIS IS PRE-PAID POSTAGE.

## 2024-12-03 NOTE — ANESTHESIA POSTPROCEDURE EVALUATION
Patient: Jay Saunders    Procedure Summary       Date: 12/03/24 Room / Location:  [facility] /  [facility]    Anesthesia Start: 0954 Anesthesia Stop: 1307    Procedure: Right foot first MTP fusion (Right: Foot) Diagnosis:       Hallux rigidus of both feet      (Hallux rigidus of both feet [M20.21, M20.22])    Surgeons: Nathanael Harvey MD Provider: Pako Naylor MD    Anesthesia Type: general with block ASA Status: 2            Anesthesia Type: general with block    Vitals  Vitals Value Taken Time   /100 12/03/24 1304   Temp     Pulse 103 12/03/24 1307   Resp     SpO2 95 % 12/03/24 1307   Vitals shown include unfiled device data.        Post Anesthesia Care and Evaluation    Patient location during evaluation: PACU  Patient participation: complete - patient participated  Level of consciousness: awake  Pain score: 0  Pain management: adequate    Airway patency: patent  Anesthetic complications: No anesthetic complications  PONV Status: none  Cardiovascular status: acceptable and stable  Respiratory status: nasal cannula, unassisted, acceptable and spontaneous ventilation  Hydration status: acceptable

## 2024-12-03 NOTE — OP NOTE
ORTHOPAEDIC SURGERY OPERATIVE REPORT    Location of Surgery: [facility]    Patient Name:  Jay Saunders  YOB: 1965    Date of Surgery:  12/3/2024     Pre-op Diagnosis:   Hallux rigidus of both feet [M20.21, M20.22]    Post-op Diagnosis:      Post-Op Diagnosis Codes:     * Hallux rigidus of both feet [M20.21, M20.22]    Procedure/CPT® Codes:  Right arthrodesis first metatarsophalangeal joint 95470  Right Bunionectomy 64771  Calcaneus autograft harvest 20902  Application short leg splint 13630    Staff:  Surgeon(s):  Nathanael Harvey MD       Anesthesia: General with Block    Estimated Blood Loss: minimal    Specimen:          None    Complications:   None    CLINICAL HISTORY:  Jay Saunders is a 59 y.o. male with the above condition. Discussed operative and non-operative treatment options and risks including but not limited to pain, infection, bleeding, damage to surrounding structures, and need for additional procedures.  After all questions were fully answered, Jay Saunders understood the risks and elected to proceed, and informed consent was obtained. The patient was marked with indelible marking pen after verifying correct side, site, and procedure.      DESCRIPTION OF PROCEDURE:   The patient was identified in the pre-operative area where correct procedure, site, and patient were verified. The patient was brought to the operating theater in stable condition and was transferred to the operative table where they remained in the supine position for the entirety of the case. Preoperative timeout was taken to ensure the correct identity, operative procedure, and location. General anesthesia was then administered. The patient received appropriate weight based IV antibiotics within 30 minutes of skin incision.  All bony prominences well-padded. The right leg was then prepped and draped in the standard sterile fashion. After Esmarch exsanguination, the tourniquet was inflated.     I  made a dorsal incision over the 1st MTP lined up with the metatarsal shaft proximally and along the hallux distally. I incised medial to the EHL sheath and then down to the joint. I dissected the capsule medially and laterally off the dorsal joint using knife. The 1st MT head was exposed by gunstocking the hallux.  A rongeur was used to remove the prominent medial eminence as well as prominent dorsal osteophyte on the distal first metatarsal. A guide pin was then placed down the MT shaft. A conical reamer was used to shave down the 1st MT head cartilage down to subchondral bone.  A drill was then used to poke holes in the 1st MT head. A guide pin was then driven down the proximal phalanx. Conical reamers were used to shave down the phalangeal surface to subchondral bone. The surface was fenestrated as well.  Next the prominent medial eminence and previously removed osteophytes were dissected free of all soft tissues.     I then turned my attention to the calcaneus. Through a separate lateral incision I harvested a core of calcaneal bone graft larger than a dowel was taken with a bone graft harvester.  The wound was then copiously irrigated.  I then closed that incision with 2-0 monocryl followed by 3-0 nylon suture.      I then turned my attention back to first MTP joint.  After checking toe alignment on a flat plate, I placed a K-wire across the joint line to hold it. Then a cannulated screw was placed to compress across the joint. A right 1st MTP plate was used to secure the arthrodesis. Three screws were placed distally in the plate and three screws were placed in the proximal plate to secure the arthrodesis.  At this point the tourniquet was deflated and hemostasis was achieved using electrocautery.  Final images were taken confirming a proper alignment of the digits as well as proper screw length and proper hardware placement.     The wound was then copiously irrigated and then closed in layers. 2-0 Monocryl  suture was used for the capsular closure. 3-0 running Monocryl was used to close the subcutaneous layer. 3-0 nylon suture was used to close the skin.  The patient was then placed in a well-padded 3-way splint with the ankle in neutral dorsiflexion.    The patient tolerated the procedure well no with acute complications identified.  All sponge & needle counts were correct x2 prior to procedure conclusion.  Patient was awoken from anesthesia and transferred back to the PACU without complication.     Counts:    Sponge: Correct    Needle: Correct    Sharp: Correct    Instrument: Correct     POSTOPERATIVE PLAN:   Patient will be discharged when they meet PACU discharge criteria. Patient is to be NWB in splint. Plan to see patient in clinic in 2-3 weeks for postop follow up. Plan for suture removal at 2-3 week follow up visit. 3 view simulated WB X-rays of foot at follow up visit (splint removed for xray). Patient to be placed in tall CAM boot at first postop visit and remain NWB for 4-6 additional weeks. See patient again at 6 weeks postop for wound check and XRs (3 view WB X-rays of foot). At 6-8 weeks from surgery date, patient can begin WBAT in tall CAM boot for an additional 4 weeks. At 10-12 weeks transition into a wide toebox supportive shoe wear.     Implants:    Implant Name Type Inv. Item Serial No.  Lot No. LRB No. Used Action   PLT MTP 5D MD RT - [id number] Implant PLT MTP 5D MD RT  PARAGON 28 NR Right 1 Implanted   SCRW ELIAS MINI MONSTER SHT THRD 3.5X28MM - [id number] Implant SCRW ELIAS MINI MONSTER SHT THRD 3.5X28MM  [employer]ON 28 NR Right 1 Implanted   SCRW PLT R3CON LK 2.7X18MM - [id number] Implant SCRW PLT R3CON LK 2.7X18MM  PARAGON 28 NR Right 2 Implanted   SCRW PLT RECON LK 2.7X20MM - [id number] Implant SCRW PLT RECON LK 2.7X20MM  PARAGON 28 NR Right 1 Implanted   SCRW PLT R3CON LK 2.7X16MM - [id number] Implant SCRW PLT R3CON LK 2.7X16MM  PARAGON 28 NR Right 2 Implanted   SCRW PLT RECON NL  2.7X17MM - [id number] Implant SCRW PLT RECON NL 2.7X17MM  PARAGON 28 NR Right 1 Implanted     Nathanael Harvey MD     Date: 12/3/2024  Time: 12:55 EST  Electronically signed by Nathanael Harvey MD, 12/03/24, 12:55 PM EST.

## 2024-12-03 NOTE — ANESTHESIA PROCEDURE NOTES
Peripheral Block    Pre-sedation assessment completed: 12/3/2024 8:18 AM    Patient reassessed immediately prior to procedure    Patient location during procedure: pre-op  Start time: 12/3/2024 8:30 AM  Stop time: 12/3/2024 8:40 AM  Reason for block: at surgeon's request and post-op pain management  Performed by  CRNA/CAA: Javi Allison, CRNA  Assisted by: Kelsey Savage RN  Preanesthetic Checklist  Completed: patient identified, IV checked, site marked, risks and benefits discussed, surgical consent, monitors and equipment checked, pre-op evaluation and timeout performed  Prep:  Sterile barriers:cap, gloves, mask and washed/disinfected hands  Prep: ChloraPrep  Patient monitoring: blood pressure monitoring, continuous pulse oximetry and EKG  Procedure    Sedation: yes  Performed under: local infiltration  Guidance:ultrasound guided    ULTRASOUND INTERPRETATION.  Using ultrasound guidance a 20 G gauge needle was placed in close proximity to the nerve, at which point, under ultrasound guidance anesthetic was injected in the area of the nerve and spread of the anesthesia was seen on ultrasound in close proximity thereto.  There were no abnormalities seen on ultrasound; a digital image was taken; and the patient tolerated the procedure with no complications. Images:still images obtained, printed/placed on chart    Laterality:right  Block Type:popliteal  Injection Technique:catheter  Needle Type:echogenic and Tuohy  Needle Gauge:18 G  Resistance on Injection: none  Catheter Size:20 G    Medications Used: bupivacaine PF (MARCAINE) 0.25 % injection - Injection   30 mL - 12/3/2024 8:40:00 AM      Medications  Preservative Free Saline:10ml    Post Assessment  Injection Assessment: negative aspiration for heme, no paresthesia on injection and incremental injection  Patient Tolerance:comfortable throughout block  Complications:no  Additional Notes  CATHETER                               A high-frequency linear transducer,  "with sterile cover, was placed in the popliteal fossa to identify the popliteal artery and vein, Tibial nerve (TN) and Common Peroneal nerve (CP). The transducer was then moved in a cephalad fashion to observe the TN and CP nerve bifurcation to form the Sciatic Nerve. The insertion site was prepped and draped in sterile fashion. Skin and cutaneous tissue was infiltrated with 2-5 ml of 1% Lidocaine. Using ultrasound-guidance, an 18-gauge Contiplex Ultra 360 Touhy needle was advanced in plane from lateral to medial. Preservative-free normal saline was utilized for hydro-dissection of tissue, advancement of Touhy needle, and to confirm final needle placement posterior to the nerves. Local anesthetic injection spread, in incremental 3-5 ml injections, to surround both nerve structures. Aspiration every 5 ml to prevent intravascular injection. Injection was completed with negative aspiration of blood and negative intravascular injection. Injection pressures were normal with minimal resistance. A 20-gauge Contiplex Echo catheter was placed through the needle and advance out the tip of the Touhy 1-3 cm. The Touhy needle was then removed, and final catheter position verified (Below/above or Anterior/Posterior) the nerve structures. The catheter was secured in the usual fashion with skin glue, benzoin, steri-strips, CHG tegaderm and Label noting \"Nerve Block Catheter\". Jerk tape applied at yellow connector and catheter connection.    Performed by: Javi Allison CRNA            "

## 2024-12-03 NOTE — ANESTHESIA PREPROCEDURE EVALUATION
Anesthesia Evaluation     Patient summary reviewed and Nursing notes reviewed   NPO Solid Status: > 8 hours  NPO Liquid Status: > 2 hours           Airway   Mallampati: I  TM distance: >3 FB  Neck ROM: full  No difficulty expected  Dental      Pulmonary     breath sounds clear to auscultation  Cardiovascular     Rhythm: regular  Rate: normal    (+) hypertension, hyperlipidemia      Neuro/Psych  (+) psychiatric history Depression  GI/Hepatic/Renal/Endo      Musculoskeletal     Abdominal    Substance History      OB/GYN          Other   arthritis,                   Anesthesia Plan    ASA 2     general with block     intravenous induction     Anesthetic plan, risks, benefits, and alternatives have been provided, discussed and informed consent has been obtained with: patient.    Plan discussed with CRNA.    CODE STATUS:         
Opt out

## 2024-12-03 NOTE — H&P
Pre-Op H&P  Jay Saunders  [id number]  1965      Chief complaint: Right foot pain      Subjective:  Patient is a 59 y.o.male presents for scheduled surgery by Dr. Harvey. He anticipates a Right foot first MTP fusion  today. He reports a long history of right foot pain. He feels as though he has tried several different conservative options including NSAIDs, accommodative shoe wear, injections yet his symptoms has persisted.       Review of Systems:  Constitutional-- No fever, chills or sweats. No fatigue.  CV-- No chest pain, palpitation or syncope. +HTN, HLD  Resp-- No SOB, cough, hemoptysis  Skin--No rashes or lesions      Allergies:   Allergies   Allergen Reactions    Percocet [Oxycodone-Acetaminophen] Nausea Only         Home Meds:  Medications Prior to Admission   Medication Sig Dispense Refill Last Dose/Taking    citalopram (CeleXA) 20 MG tablet Take 1 tablet by mouth Daily. 90 tablet 3 12/2/2024 Bedtime    Cyanocobalamin (VITAMIN B 12 PO) Take  by mouth Daily.   12/2/2024 Morning    dilTIAZem (TIAZAC) 240 MG 24 hr capsule Take 1 capsule by mouth once daily 90 capsule 0 12/2/2024 Morning    lisinopril-hydrochlorothiazide (PRINZIDE,ZESTORETIC) 20-12.5 MG per tablet Take 2 tablets by mouth once daily (Patient taking differently: Take 2 tablets by mouth Daily. Hold am of sugkeagan) 180 tablet 0 12/2/2024 Morning    multivitamin with minerals (MULTIVITAMIN ADULT PO) Take 1 tablet by mouth Daily.   12/2/2024 Morning    ondansetron ODT (ZOFRAN-ODT) 8 MG disintegrating tablet Place 1 tablet on the tongue Every 8 (Eight) Hours As Needed for Nausea or Vomiting. 20 tablet 0 12/2/2024 Evening    pantoprazole (PROTONIX) 40 MG EC tablet Take 1 tablet by mouth Daily. 90 tablet 3 12/2/2024 Evening    simvastatin (ZOCOR) 40 MG tablet Take 1 tablet by mouth Daily. 90 tablet 3 12/2/2024 Evening    tamsulosin (FLOMAX) 0.4 MG capsule 24 hr capsule Take 1 capsule by mouth Daily. 90 capsule 3 12/2/2024 Morning    diclofenac  (VOLTAREN) 75 MG EC tablet Take 1 tablet by mouth twice daily 180 tablet 0 2024 at  9:00 PM    tiZANidine (ZANAFLEX) 2 MG tablet Take 1 tablet by mouth Every 8 (Eight) Hours As Needed for Muscle Spasms. 30 tablet 1 More than a month         PMH:   Past Medical History:   Diagnosis Date    Acromioclavicular separation     Acute maxillary sinusitis     Ankle sprain     Anxiety     Arthritis of back     Arthritis of neck     Bursitis of hip     Cervical disc disorder     Dislocation, shoulder     Essential hypertension, benign     Fracture of hip     Fracture of wrist     Fracture, femur     Fracture, finger     Fracture, humerus     Hyperlipidemia     Knee swelling     Low back strain     Lumbar spondylosis     Lumbosacral disc disease     Neuroma of foot     Osteoarthritis     Periarthritis of shoulder     Rotator cuff syndrome     Routine general medical examination at a health care facility     Shoulder pain     Special screening for malignant neoplasm of prostate     Spinal stenosis     Tear of meniscus of knee     Thoracic disc disorder     Wrist sprain      PSH:    Past Surgical History:   Procedure Laterality Date    BACK SURGERY      Discectomy lumbar    EYE SURGERY Bilateral     Lasik    JOINT REPLACEMENT Right     hip and shoulder    ROTATOR CUFF REPAIR Right     SHOULDER SURGERY Right     labrum repair    VASECTOMY      WISDOM TOOTH EXTRACTION      WRIST SURGERY Left     tendon repair       Immunization History:  Influenza: No  Pneumococcal: No  Tetanus: Unknown    Social History:   Tobacco:   Social History     Tobacco Use   Smoking Status Former    Current packs/day: 0.00    Average packs/day: 1 pack/day for 10.8 years (10.8 ttl pk-yrs)    Types: Cigarettes    Start date: 1985    Quit date: 10/13/1995    Years since quittin.1    Passive exposure: Past   Smokeless Tobacco Never      Alcohol:     Social History     Substance and Sexual Activity   Alcohol Use No    Comment: rarely  "        Physical Exam:/86 (BP Location: Right arm, Patient Position: Lying)   Pulse 57   Temp 98.1 °F (36.7 °C) (Temporal)   Resp 18   Ht 177.8 cm (70\")   Wt 109 kg (240 lb)   SpO2 93%   BMI 34.44 kg/m²       General Appearance:    Alert, cooperative, no distress, appears stated age   Head:    Normocephalic, without obvious abnormality, atraumatic   Lungs:     Clear to auscultation bilaterally, respirations unlabored    Heart:   Regular rate and rhythm, S1 and S2 normal    Abdomen:    Soft without tenderness   Extremities:   Extremities normal, atraumatic, no cyanosis or edema   Skin:   Skin color, texture, turgor normal, no rashes or lesions   Neurologic:   Grossly intact     Results Review:     LABS:  Lab Results   Component Value Date    WBC 5.23 11/27/2024    HGB 15.9 11/27/2024    HCT 48.1 11/27/2024    MCV 90.6 11/27/2024     11/27/2024    NEUTROABS 2.65 11/27/2024    GLUCOSE 90 11/27/2024    BUN 17 11/27/2024    CREATININE 0.99 11/27/2024    EGFRIFNONA 91 03/31/2021     11/27/2024    K 3.6 11/27/2024     11/27/2024    CO2 28.0 11/27/2024    CALCIUM 9.5 11/27/2024    ALBUMIN 5.3 (H) 12/19/2023    AST 35 12/19/2023    ALT 67 (H) 12/19/2023    BILITOT 0.6 12/19/2023       RADIOLOGY:  Imaging Results (Last 72 Hours)       ** No results found for the last 72 hours. **            I reviewed the patient's new clinical results.    Cancer Staging (if applicable)  Cancer Patient: __ yes __no __unknown; If yes, clinical stage T:__ N:__M:__, stage group or __N/A      Impression: Hallux rigidus of both feet      Plan: Right foot first MTP fusion       Krystal CHAUNCEY Resendiz   12/3/2024   08:11 EST  "

## 2024-12-03 NOTE — ANESTHESIA PROCEDURE NOTES
Airway  Urgency: elective    Date/Time: 12/3/2024 10:00 AM  Airway not difficult    General Information and Staff    Patient location during procedure: OR  CRNA/CAA: Sarbjit Ramirez CRNA    Indications and Patient Condition  Indications for airway management: airway protection    Preoxygenated: yes  MILS not maintained throughout  Mask difficulty assessment: 3 - difficult mask (inadequate, unstable or two providers) +/- NMBA    Final Airway Details  Final airway type: endotracheal airway      Successful airway: ETT  Cuffed: yes   Successful intubation technique: video laryngoscopy  Facilitating devices/methods: intubating stylet  Endotracheal tube insertion site: oral  Blade: Nice  Blade size: 3  ETT size (mm): 7.5  Cormack-Lehane Classification: grade IIa - partial view of glottis  Placement verified by: chest auscultation and capnometry   Cuff volume (mL): 10  Measured from: lips  ETT/EBT  to lips (cm): 23  Number of attempts at approach: 1  Assessment: lips, teeth, and gum same as pre-op and atraumatic intubation    Additional Comments  Negative epigastric sounds, Breath sound equal bilaterally with symmetric chest rise and fall

## 2024-12-09 NOTE — PROGRESS NOTES
Enter Query Response Below      Query Response:     Op Note addended         If applicable, please update the problem list.   Patient: Jay Saunders        : 1965  Account: [id number]           Admit Date: 12/3/2024      : Wes       Will you please addend the OP Note to include the size of the calcaneus autograft?      If you have questions about this query, please contact me at [phone]    Sincerely,  [facility] Coding Department

## 2024-12-10 ENCOUNTER — TELEPHONE (OUTPATIENT)
Age: 59
End: 2024-12-10
Payer: COMMERCIAL

## 2024-12-10 NOTE — TELEPHONE ENCOUNTER
Completed the requested work note. Called to let the patient know he could pull it from his MyChart

## 2024-12-18 ENCOUNTER — OFFICE VISIT (OUTPATIENT)
Dept: ORTHOPEDIC SURGERY | Facility: CLINIC | Age: 59
End: 2024-12-18
Payer: COMMERCIAL

## 2024-12-18 VITALS — TEMPERATURE: 97.1 F

## 2024-12-18 DIAGNOSIS — Z09 SURGERY FOLLOW-UP: Primary | ICD-10-CM

## 2024-12-18 PROCEDURE — 99024 POSTOP FOLLOW-UP VISIT: CPT | Performed by: ORTHOPAEDIC SURGERY

## 2024-12-18 NOTE — PROGRESS NOTES
[facility] [place] [facility] Office Follow Up     Office Follow Up Visit     Date: 12/18/2024   Patient Name: Jay Saunders  MRN: [id number]  YOB: 1965  Chief Complaint:   Chief Complaint   Patient presents with    Post-op     2 week S/P FOOT FIRST MTP FUSION RIGHT(DOS 12/3/24)     History of Present Illness:   Jay Saunders is a 59 y.o. male who is here today for 2-week follow-up status post right first MTP fusion with Calc autograft harvest.  Has been nonweightbearing in splint since surgery.  Denies pain.  Happy with progress.  No complaints.    Subjective   I reviewed the patient's chief complaint, history of present illness, review of systems, past medical history, surgical history, family history, social history, medications and allergy list   Objective    Vital Signs:   Vitals:    12/18/24 1116   Temp: 97.1 °F (36.2 °C)     There is no height or weight on file to calculate BMI.    Ortho Exam:  right LE Foot and Ankle Exam:   Dressing removed for exam.  Leg compartments soft and compressible.  Foot incisions clean dry and intact, sutures in place, no drainage or erythema.  Able to actively plantarflex and dorsiflex ankle and all toes.  Appropriate swelling for this stage of healing about the foot.  Toes warm and well-perfused.  Brisk cap refill in all toes.     Results Review:  XR Foot 3+ View Right  Right Foot X-Ray 12/18/24   Indication: Pain  Views: 3 simulated weight bearing , comparison to previous  Findings: xrays reviewed by me yeah you can take the day, I need to look   at his toe to look at his incision he is 2 weeks out today in the office   and show, hardware in place with proper alignment, no signs of hardware   failure, No visible joint space first MTP fusion site       Assessment / Plan    Assessment/Plan:   Diagnoses and all orders for this visit:    1. Surgery follow-up (Primary)  -     Cancel: XR Foot 3+ View Left  -     XR Foot 3+ View  Right      Patient is now 2 weeks postop, doing well. Happy with result, no new complaints. Placed in tall cam walking boot in clinic today of which he can be heel weightbearing for short distances. Sutures removed in clinic today.  Steri-Strips placed.  Continue to elevate during recovery.   Continue ASA for DVT ppx. Plan to see patient back in clinic in 6 weeks for repeat x-rays and reevaluation.  Patient counseled to contact the clinic if anything should arise in the meantime.  I am happy with his progress.  It was a pleasure seeing him today.    Follow Up:   Return in about 6 weeks (around 1/29/2025) for F/u Recheck with images.      Nathanael Harvey MD  [facility] [place]

## 2025-01-04 DIAGNOSIS — I10 ESSENTIAL HYPERTENSION: ICD-10-CM

## 2025-01-04 DIAGNOSIS — F41.9 ANXIETY: ICD-10-CM

## 2025-01-04 DIAGNOSIS — E78.49 OTHER HYPERLIPIDEMIA: ICD-10-CM

## 2025-01-07 ENCOUNTER — OFFICE VISIT (OUTPATIENT)
Dept: FAMILY MEDICINE CLINIC | Facility: CLINIC | Age: 60
End: 2025-01-07
Payer: COMMERCIAL

## 2025-01-07 VITALS
OXYGEN SATURATION: 97 % | HEART RATE: 84 BPM | SYSTOLIC BLOOD PRESSURE: 120 MMHG | BODY MASS INDEX: 35.22 KG/M2 | TEMPERATURE: 97.6 F | WEIGHT: 246 LBS | DIASTOLIC BLOOD PRESSURE: 82 MMHG | RESPIRATION RATE: 16 BRPM | HEIGHT: 70 IN

## 2025-01-07 DIAGNOSIS — F41.9 ANXIETY: ICD-10-CM

## 2025-01-07 DIAGNOSIS — G47.30 OBSERVED SLEEP APNEA: ICD-10-CM

## 2025-01-07 DIAGNOSIS — R06.09 EXERTIONAL DYSPNEA: ICD-10-CM

## 2025-01-07 DIAGNOSIS — Z82.49 FAMILY HISTORY OF CORONARY ARTERY DISEASE: ICD-10-CM

## 2025-01-07 DIAGNOSIS — I10 ESSENTIAL HYPERTENSION: Primary | ICD-10-CM

## 2025-01-07 DIAGNOSIS — K21.9 GASTROESOPHAGEAL REFLUX DISEASE: ICD-10-CM

## 2025-01-07 DIAGNOSIS — Z00.00 ANNUAL PHYSICAL EXAM: ICD-10-CM

## 2025-01-07 DIAGNOSIS — R97.20 ELEVATED PSA: ICD-10-CM

## 2025-01-07 DIAGNOSIS — R42 DIZZINESS: ICD-10-CM

## 2025-01-07 DIAGNOSIS — E78.49 OTHER HYPERLIPIDEMIA: ICD-10-CM

## 2025-01-07 PROCEDURE — 99396 PREV VISIT EST AGE 40-64: CPT | Performed by: FAMILY MEDICINE

## 2025-01-07 RX ORDER — CITALOPRAM HYDROBROMIDE 20 MG/1
20 TABLET ORAL DAILY
Qty: 90 TABLET | Refills: 3 | Status: SHIPPED | OUTPATIENT
Start: 2025-01-07

## 2025-01-07 RX ORDER — DILTIAZEM HYDROCHLORIDE 240 MG/1
240 CAPSULE, EXTENDED RELEASE ORAL DAILY
Qty: 90 CAPSULE | Refills: 3 | Status: SHIPPED | OUTPATIENT
Start: 2025-01-07

## 2025-01-07 RX ORDER — LISINOPRIL AND HYDROCHLOROTHIAZIDE 12.5; 2 MG/1; MG/1
2 TABLET ORAL DAILY
Qty: 180 TABLET | Refills: 0 | Status: SHIPPED | OUTPATIENT
Start: 2025-01-07 | End: 2025-01-07 | Stop reason: SDUPTHER

## 2025-01-07 RX ORDER — LISINOPRIL AND HYDROCHLOROTHIAZIDE 12.5; 2 MG/1; MG/1
2 TABLET ORAL DAILY
Qty: 180 TABLET | Refills: 3 | Status: SHIPPED | OUTPATIENT
Start: 2025-01-07

## 2025-01-07 RX ORDER — SIMVASTATIN 40 MG
40 TABLET ORAL DAILY
Qty: 90 TABLET | Refills: 3 | Status: SHIPPED | OUTPATIENT
Start: 2025-01-07

## 2025-01-07 RX ORDER — DICLOFENAC SODIUM 75 MG/1
75 TABLET, DELAYED RELEASE ORAL 2 TIMES DAILY
Qty: 180 TABLET | Refills: 0 | Status: SHIPPED | OUTPATIENT
Start: 2025-01-07

## 2025-01-07 RX ORDER — PANTOPRAZOLE SODIUM 40 MG/1
40 TABLET, DELAYED RELEASE ORAL DAILY
Qty: 90 TABLET | Refills: 3 | Status: SHIPPED | OUTPATIENT
Start: 2025-01-07

## 2025-01-08 NOTE — PROGRESS NOTES
Subjective   Jay Saunders is a 59 y.o. male    Chief Complaint    Annual physical exam  Prostate cancer screening  Status post foot surgery for fusion  Hypertension  Hyperlipidemia  History of elevated PSA    History of Present Illness  History of Present Illness  The patient is a 59-year-old male who presents today for his annual physical. He recently had surgery on his foot for a fusion. Other chronic health problems include hypertension, hyperlipidemia, anxiety, and elevated PSA. He is followed by Dr. Lopez in urology.    He reports an improvement in his foot condition following the fusion surgery, which involved the insertion of a plate and seven screws. He was prescribed pain medication post-surgery but only required three doses. After cast removal, he was fitted with a boot and is now able to walk on his heel without additional support.    He experiences dizziness when bending over or exerting himself, a symptom that has progressively worsened over the years. This lightheadedness is severe enough to require him to sit down after performing tasks such as tying his shoes or bending over to  objects. He also reports shortness of breath after walking a short distance uphill. He is concerned about potential circulatory issues and notes a family history of heart disease.    He has noticed a bulge in his abdomen when lying flat and attempting to sit up, which he suspects may be a hernia.    He has been informed by his anesthesiologist that he may have sleep apnea and has been advised to undergo a sleep study.    He has observed an upward trend in his monocyte count on his CBC, which he interprets as a sign of infection. He is scheduled for a follow-up appointment with his urologist in March or April 2024.     He expresses concern about his persistently high PSA levels, which have been increasing. He was unable to undergo an MRI due to a previous hip replacement and is currently under observation with  biannual check-ups.    FAMILY HISTORY  His father passed away at 82, grandfather at 41, and great-grandfather at 39 due to cardiac events.      The following portions of the patient's history were reviewed and updated as appropriate: allergies, current medications, past social history and problem list    Review of Systems   Constitutional: Negative.  Negative for chills, fatigue, fever and unexpected weight change.   HENT: Negative.     Eyes: Negative.    Respiratory: Negative.  Negative for cough, chest tightness, shortness of breath and wheezing.    Cardiovascular: Negative.  Negative for chest pain, palpitations and leg swelling.   Gastrointestinal: Negative.  Negative for abdominal pain, nausea and vomiting.   Endocrine: Negative.  Negative for polydipsia, polyphagia and polyuria.   Genitourinary: Negative.  Negative for dysuria, frequency and urgency.   Musculoskeletal:  Positive for arthralgias, gait problem and myalgias. Negative for back pain.   Skin: Negative.  Negative for color change and rash.   Allergic/Immunologic: Negative.    Neurological:  Negative for dizziness, syncope, weakness and headaches.   Hematological: Negative.  Negative for adenopathy. Does not bruise/bleed easily.   Psychiatric/Behavioral: Negative.     All other systems reviewed and are negative.    Objective     Vitals:    01/07/25 1355   BP: 120/82   Pulse: 84   Resp: 16   Temp: 97.6 °F (36.4 °C)   SpO2: 97%       Physical Exam  Vitals and nursing note reviewed.   Constitutional:       General: He is not in acute distress.     Appearance: Normal appearance. He is well-developed. He is obese. He is not ill-appearing, toxic-appearing or diaphoretic.   HENT:      Head: Normocephalic and atraumatic.      Nose: Nose normal.      Mouth/Throat:      Mouth: Mucous membranes are moist.      Pharynx: Oropharynx is clear.   Eyes:      General: No scleral icterus.     Conjunctiva/sclera: Conjunctivae normal.      Pupils: Pupils are equal, round,  and reactive to light.   Neck:      Thyroid: No thyromegaly.      Vascular: No carotid bruit or JVD.   Cardiovascular:      Rate and Rhythm: Normal rate and regular rhythm.      Pulses: Normal pulses.      Heart sounds: Normal heart sounds. No murmur heard.  Pulmonary:      Effort: Pulmonary effort is normal. No respiratory distress.      Breath sounds: Normal breath sounds.   Abdominal:      General: Bowel sounds are normal.      Palpations: Abdomen is soft.      Tenderness: There is no abdominal tenderness.      Hernia: No hernia is present.      Comments: Diastases rectus deformity   Musculoskeletal:      Cervical back: Neck supple.      Right lower leg: No edema.      Left lower leg: No edema.   Lymphadenopathy:      Cervical: No cervical adenopathy.   Skin:     General: Skin is warm and dry.      Findings: No rash.   Neurological:      Mental Status: He is alert and oriented to person, place, and time.   Psychiatric:         Mood and Affect: Mood normal.         Behavior: Behavior normal.   Physical Exam      Assessment & Plan   Assessment & Plan  1. Annual physical examination.  A comprehensive blood work panel will be ordered, including a complete blood count (CBC) with differential and a prostate-specific antigen (PSA) test. All necessary medication refills will be provided.    2. Sleep apnea.  A referral to a sleep specialist will be initiated for further evaluation and management.    3. Dizziness.  A referral to a cardiologist will be made for further evaluation and management.    4. Diastasis rectus.  No immediate intervention is required as it is not a hernia.    PROCEDURE  The patient had a foot fusion surgery involving the insertion of a plate and seven screws.    Problems Addressed this Visit          Cardiac and Vasculature    Hyperlipidemia    Relevant Orders    Comprehensive Metabolic Panel    Lipid Panel    Ambulatory Referral to Cardiology       Mental Health    Anxiety    Relevant Orders     Comprehensive Metabolic Panel    TSH    T4, Free     Other Visit Diagnoses       Essential hypertension    -  Primary    Relevant Medications    dilTIAZem (TIAZAC) 240 MG 24 hr capsule    lisinopril-hydrochlorothiazide (PRINZIDE,ZESTORETIC) 20-12.5 MG per tablet    Other Relevant Orders    Comprehensive Metabolic Panel    TSH    T4, Free    Ambulatory Referral to Cardiology    Gastroesophageal reflux disease        Relevant Medications    pantoprazole (PROTONIX) 40 MG EC tablet    Observed sleep apnea        Relevant Orders    Ambulatory Referral to Sleep Medicine    Annual physical exam        Relevant Orders    CBC & Differential    Comprehensive Metabolic Panel    Lipid Panel    TSH    T4, Free    Elevated PSA        Relevant Orders    Comprehensive Metabolic Panel    PSA, Total & Free    Exertional dyspnea        Relevant Orders    Ambulatory Referral to Cardiology    Dizziness        Relevant Orders    Ambulatory Referral to Cardiology    Family history of coronary artery disease        Relevant Orders    Ambulatory Referral to Cardiology          Diagnoses         Codes Comments    Essential hypertension    -  Primary ICD-10-CM: I10  ICD-9-CM: 401.9     Other hyperlipidemia     ICD-10-CM: E78.49  ICD-9-CM: 272.4     Anxiety     ICD-10-CM: F41.9  ICD-9-CM: 300.00     Gastroesophageal reflux disease     ICD-10-CM: K21.9  ICD-9-CM: 530.81     Observed sleep apnea     ICD-10-CM: G47.30  ICD-9-CM: 780.57     Annual physical exam     ICD-10-CM: Z00.00  ICD-9-CM: V70.0     Elevated PSA     ICD-10-CM: R97.20  ICD-9-CM: 790.93     Exertional dyspnea     ICD-10-CM: R06.09  ICD-9-CM: 786.09     Dizziness     ICD-10-CM: R42  ICD-9-CM: 780.4     Family history of coronary artery disease     ICD-10-CM: Z82.49  ICD-9-CM: V17.3             Preventive medicine discussed, diet, exercise, healthy living discussed at length.  Discussed nutrition, physical activity, healthy weight, injury prevention, misuse of tobacco, alcohol and  drugs, dental health, mental health, immunizations, screening    Part of this note may be an electronic transcription/translation of spoken language to printed text using the Dragon Dictation System.

## 2025-01-09 ENCOUNTER — LAB (OUTPATIENT)
Facility: HOSPITAL | Age: 60
End: 2025-01-09
Payer: COMMERCIAL

## 2025-01-09 ENCOUNTER — OFFICE VISIT (OUTPATIENT)
Dept: CARDIOLOGY | Facility: CLINIC | Age: 60
End: 2025-01-09
Payer: COMMERCIAL

## 2025-01-09 VITALS
BODY MASS INDEX: 35.3 KG/M2 | SYSTOLIC BLOOD PRESSURE: 118 MMHG | HEIGHT: 70 IN | DIASTOLIC BLOOD PRESSURE: 64 MMHG | HEART RATE: 88 BPM | WEIGHT: 246.6 LBS | OXYGEN SATURATION: 94 %

## 2025-01-09 DIAGNOSIS — Z00.00 ANNUAL PHYSICAL EXAM: ICD-10-CM

## 2025-01-09 DIAGNOSIS — F41.9 ANXIETY: ICD-10-CM

## 2025-01-09 DIAGNOSIS — I10 ESSENTIAL HYPERTENSION, BENIGN: ICD-10-CM

## 2025-01-09 DIAGNOSIS — R06.09 DYSPNEA ON EXERTION: ICD-10-CM

## 2025-01-09 DIAGNOSIS — E78.2 MIXED HYPERLIPIDEMIA: ICD-10-CM

## 2025-01-09 DIAGNOSIS — I10 ESSENTIAL HYPERTENSION: ICD-10-CM

## 2025-01-09 DIAGNOSIS — R42 DIZZINESS: ICD-10-CM

## 2025-01-09 DIAGNOSIS — G89.29 CHRONIC CHEST PAIN WITH HIGH RISK FOR CAD: Primary | ICD-10-CM

## 2025-01-09 DIAGNOSIS — Z91.89 CHRONIC CHEST PAIN WITH HIGH RISK FOR CAD: Primary | ICD-10-CM

## 2025-01-09 DIAGNOSIS — E78.49 OTHER HYPERLIPIDEMIA: ICD-10-CM

## 2025-01-09 DIAGNOSIS — R07.9 CHRONIC CHEST PAIN WITH HIGH RISK FOR CAD: Primary | ICD-10-CM

## 2025-01-09 DIAGNOSIS — R97.20 ELEVATED PSA: ICD-10-CM

## 2025-01-09 LAB
ALBUMIN SERPL-MCNC: 4.7 G/DL (ref 3.5–5.2)
ALBUMIN/GLOB SERPL: 1.7 G/DL
ALP SERPL-CCNC: 65 U/L (ref 39–117)
ALT SERPL W P-5'-P-CCNC: 63 U/L (ref 1–41)
ANION GAP SERPL CALCULATED.3IONS-SCNC: 13 MMOL/L (ref 5–15)
AST SERPL-CCNC: 37 U/L (ref 1–40)
BASOPHILS # BLD AUTO: 0.04 10*3/MM3 (ref 0–0.2)
BASOPHILS NFR BLD AUTO: 0.8 % (ref 0–1.5)
BILIRUB SERPL-MCNC: 0.7 MG/DL (ref 0–1.2)
BUN SERPL-MCNC: 16 MG/DL (ref 6–20)
BUN/CREAT SERPL: 12.4 (ref 7–25)
CALCIUM SPEC-SCNC: 9.9 MG/DL (ref 8.6–10.5)
CHLORIDE SERPL-SCNC: 104 MMOL/L (ref 98–107)
CHOLEST SERPL-MCNC: 163 MG/DL (ref 0–200)
CO2 SERPL-SCNC: 27 MMOL/L (ref 22–29)
CREAT SERPL-MCNC: 1.29 MG/DL (ref 0.76–1.27)
DEPRECATED RDW RBC AUTO: 40.1 FL (ref 37–54)
EGFRCR SERPLBLD CKD-EPI 2021: 64.7 ML/MIN/1.73
EOSINOPHIL # BLD AUTO: 0.1 10*3/MM3 (ref 0–0.4)
EOSINOPHIL NFR BLD AUTO: 2 % (ref 0.3–6.2)
ERYTHROCYTE [DISTWIDTH] IN BLOOD BY AUTOMATED COUNT: 12.2 % (ref 12.3–15.4)
GLOBULIN UR ELPH-MCNC: 2.8 GM/DL
GLUCOSE SERPL-MCNC: 94 MG/DL (ref 65–99)
HCT VFR BLD AUTO: 48.8 % (ref 37.5–51)
HDLC SERPL-MCNC: 45 MG/DL (ref 40–60)
HGB BLD-MCNC: 16.8 G/DL (ref 13–17.7)
IMM GRANULOCYTES # BLD AUTO: 0.01 10*3/MM3 (ref 0–0.05)
IMM GRANULOCYTES NFR BLD AUTO: 0.2 % (ref 0–0.5)
LDLC SERPL CALC-MCNC: 87 MG/DL (ref 0–100)
LDLC/HDLC SERPL: 1.83 {RATIO}
LYMPHOCYTES # BLD AUTO: 1.19 10*3/MM3 (ref 0.7–3.1)
LYMPHOCYTES NFR BLD AUTO: 23.3 % (ref 19.6–45.3)
MCH RBC QN AUTO: 30.6 PG (ref 26.6–33)
MCHC RBC AUTO-ENTMCNC: 34.4 G/DL (ref 31.5–35.7)
MCV RBC AUTO: 88.9 FL (ref 79–97)
MONOCYTES # BLD AUTO: 0.63 10*3/MM3 (ref 0.1–0.9)
MONOCYTES NFR BLD AUTO: 12.3 % (ref 5–12)
NEUTROPHILS NFR BLD AUTO: 3.14 10*3/MM3 (ref 1.7–7)
NEUTROPHILS NFR BLD AUTO: 61.4 % (ref 42.7–76)
NRBC BLD AUTO-RTO: 0 /100 WBC (ref 0–0.2)
PLATELET # BLD AUTO: 255 10*3/MM3 (ref 140–450)
PMV BLD AUTO: 9.3 FL (ref 6–12)
POTASSIUM SERPL-SCNC: 4.1 MMOL/L (ref 3.5–5.2)
PROT SERPL-MCNC: 7.5 G/DL (ref 6–8.5)
RBC # BLD AUTO: 5.49 10*6/MM3 (ref 4.14–5.8)
SODIUM SERPL-SCNC: 144 MMOL/L (ref 136–145)
T4 FREE SERPL-MCNC: 1.53 NG/DL (ref 0.92–1.68)
TRIGL SERPL-MCNC: 179 MG/DL (ref 0–150)
TSH SERPL DL<=0.05 MIU/L-ACNC: 2.57 UIU/ML (ref 0.27–4.2)
VLDLC SERPL-MCNC: 31 MG/DL (ref 5–40)
WBC NRBC COR # BLD AUTO: 5.11 10*3/MM3 (ref 3.4–10.8)

## 2025-01-09 PROCEDURE — 84153 ASSAY OF PSA TOTAL: CPT

## 2025-01-09 PROCEDURE — 36415 COLL VENOUS BLD VENIPUNCTURE: CPT

## 2025-01-09 PROCEDURE — 99204 OFFICE O/P NEW MOD 45 MIN: CPT | Performed by: INTERNAL MEDICINE

## 2025-01-09 PROCEDURE — 84439 ASSAY OF FREE THYROXINE: CPT

## 2025-01-09 PROCEDURE — 80050 GENERAL HEALTH PANEL: CPT

## 2025-01-09 PROCEDURE — 80061 LIPID PANEL: CPT

## 2025-01-09 PROCEDURE — 93000 ELECTROCARDIOGRAM COMPLETE: CPT | Performed by: INTERNAL MEDICINE

## 2025-01-09 PROCEDURE — 84154 ASSAY OF PSA FREE: CPT

## 2025-01-09 RX ORDER — METOPROLOL TARTRATE 25 MG/1
25 TABLET, FILM COATED ORAL ONCE
OUTPATIENT
Start: 2025-01-09

## 2025-01-09 RX ORDER — SODIUM CHLORIDE 0.9 % (FLUSH) 0.9 %
10 SYRINGE (ML) INJECTION AS NEEDED
OUTPATIENT
Start: 2025-01-09

## 2025-01-09 RX ORDER — IVABRADINE 5 MG/1
15 TABLET, FILM COATED ORAL ONCE
OUTPATIENT
Start: 2025-01-09 | End: 2025-01-09

## 2025-01-09 RX ORDER — METOPROLOL TARTRATE 100 MG/1
TABLET ORAL
Qty: 2 TABLET | Refills: 0 | Status: SHIPPED | OUTPATIENT
Start: 2025-01-09

## 2025-01-09 RX ORDER — METOPROLOL TARTRATE 25 MG/1
50 TABLET, FILM COATED ORAL ONCE
OUTPATIENT
Start: 2025-01-09

## 2025-01-09 RX ORDER — METOPROLOL TARTRATE 25 MG/1
200 TABLET, FILM COATED ORAL ONCE
OUTPATIENT
Start: 2025-01-09 | End: 2025-01-09

## 2025-01-09 RX ORDER — NITROGLYCERIN 0.4 MG/1
0.4 TABLET SUBLINGUAL
OUTPATIENT
Start: 2025-01-09 | End: 2025-01-09

## 2025-01-09 RX ORDER — SODIUM CHLORIDE 9 MG/ML
40 INJECTION, SOLUTION INTRAVENOUS AS NEEDED
OUTPATIENT
Start: 2025-01-09

## 2025-01-09 RX ORDER — METOPROLOL TARTRATE 25 MG/1
100 TABLET, FILM COATED ORAL ONCE
OUTPATIENT
Start: 2025-01-09

## 2025-01-09 RX ORDER — METOPROLOL TARTRATE 50 MG
50 TABLET ORAL
OUTPATIENT
Start: 2025-01-09

## 2025-01-09 RX ORDER — METOPROLOL TARTRATE 1 MG/ML
5 INJECTION, SOLUTION INTRAVENOUS
OUTPATIENT
Start: 2025-01-09

## 2025-01-09 RX ORDER — METOPROLOL TARTRATE 25 MG/1
150 TABLET, FILM COATED ORAL ONCE
OUTPATIENT
Start: 2025-01-09

## 2025-01-09 RX ORDER — SODIUM CHLORIDE 0.9 % (FLUSH) 0.9 %
10 SYRINGE (ML) INJECTION EVERY 12 HOURS SCHEDULED
OUTPATIENT
Start: 2025-01-09

## 2025-01-09 RX ORDER — NITROGLYCERIN 0.4 MG/1
0.8 TABLET SUBLINGUAL
OUTPATIENT
Start: 2025-01-09

## 2025-01-09 RX ORDER — ASPIRIN 81 MG/1
81 TABLET ORAL DAILY
COMMUNITY

## 2025-01-09 NOTE — PROGRESS NOTES
[facility] Cardiology  Office Note  Jay Saunders  1965  [address] [place] [id number]     VISIT DATE:  01/09/25    PCP: Hank Roque MD  [id number] [facility]   [place] [id number]    CC: Dyspnea  Chief Complaint   Patient presents with    Hypertension    Establish Care    Dizziness       PROBLEM LIST:    Dyspnea  Dizziness  Hyperlipidemia  Hypertension    Allergies  Allergies   Allergen Reactions    Percocet [Oxycodone-Acetaminophen] Nausea Only       Current Medications    Current Outpatient Medications:     aspirin 81 MG EC tablet, Take 1 tablet by mouth Daily., Disp: , Rfl:     citalopram (CeleXA) 20 MG tablet, Take 1 tablet by mouth once daily, Disp: 90 tablet, Rfl: 3    Cyanocobalamin (VITAMIN B 12 PO), Take  by mouth Daily., Disp: , Rfl:     diclofenac (VOLTAREN) 75 MG EC tablet, Take 1 tablet by mouth twice daily, Disp: 180 tablet, Rfl: 0    dilTIAZem (TIAZAC) 240 MG 24 hr capsule, Take 1 capsule by mouth Daily., Disp: 90 capsule, Rfl: 3    lisinopril-hydrochlorothiazide (PRINZIDE,ZESTORETIC) 20-12.5 MG per tablet, Take 2 tablets by mouth Daily., Disp: 180 tablet, Rfl: 3    multivitamin with minerals (MULTIVITAMIN ADULT PO), Take 1 tablet by mouth Daily., Disp: , Rfl:     pantoprazole (PROTONIX) 40 MG EC tablet, Take 1 tablet by mouth Daily., Disp: 90 tablet, Rfl: 3    simvastatin (ZOCOR) 40 MG tablet, Take 1 tablet by mouth once daily, Disp: 90 tablet, Rfl: 3    tamsulosin (FLOMAX) 0.4 MG capsule 24 hr capsule, Take 1 capsule by mouth Daily., Disp: 90 capsule, Rfl: 3    metoprolol tartrate (LOPRESSOR) 100 MG tablet, Take 100 mg at Bedtime the Night Before Coronary CTA Appointment and In the Morning 1 Hour Prior to Coronary CTA Appointment. Do not take if heart rate less than 60., Disp: 2 tablet, Rfl: 0     History of Present Illness   Jay Saunders is a 59 y.o. year old male who presents for consult from Hank Morris* for evaluation of  "dyspnea.  Patient's EKG today shows sinus rhythm.  Patient states he occasionally have some chest pain.  However mostly has shortness of breath.  States that no syncope lightheadedness or however does have dizziness.  Mostly when he bends over or stands up.  Has not passed out.  Does occasionally have some palpitations.  Notes lower extremity swelling really.  No orthopnea or paroxysmal nocturnal dyspnea.  Patient denies history of cardiac testing or cardiac evaluation.  Patient states his blood pressure tends to be controlled at home.  Patient states he just has had progressive decline in his ability to to be functional as stated concern to his dyspnea.  Patient presents here for further evaluation.    Pt denies any chest pain, orthopnea, PND, palpitations, lower extremity edema, or claudication. Pt denies history of CHF, DVT, PE, MI, CVA, TIA, or rheumatic fever.     SOCIAL HX  Social History     Socioeconomic History    Marital status:    Tobacco Use    Smoking status: Former     Current packs/day: 0.00     Average packs/day: 1 pack/day for 10.8 years (10.8 ttl pk-yrs)     Types: Cigarettes     Start date: 1985     Quit date: 10/13/1995     Years since quittin.2     Passive exposure: Past    Smokeless tobacco: Never   Vaping Use    Vaping status: Never Used   Substance and Sexual Activity    Alcohol use: No     Comment: rarely    Drug use: No    Sexual activity: Yes     Partners: Female     Birth control/protection: Post-menopausal, Vasectomy, Hysterectomy       FAMILY HX  Family History   Problem Relation Age of Onset    Thyroid disease Mother     Hypertension Father     Diabetes Father     Diabetes Maternal Grandmother     Cancer Paternal Grandmother        Vitals:    25 0941   BP: 118/64   BP Location: Right arm   Patient Position: Sitting   Cuff Size: Adult   Pulse: 88   SpO2: 94%   Weight: 112 kg (246 lb 9.6 oz)   Height: 177.8 cm (70\")     Body mass index is 35.38 kg/m².     PHYSICAL " EXAMINATION:  Vitals and nursing note reviewed.   Constitutional:       Appearance: Healthy appearance. Not in distress.   Eyes:      Conjunctiva/sclera: Conjunctivae normal.      Pupils: Pupils are equal, round, and reactive to light.   HENT:      Nose: Nose normal.    Mouth/Throat:      Pharynx: Oropharynx is clear.   Neck:      Vascular: JVD normal.   Pulmonary:      Effort: Pulmonary effort is normal.      Breath sounds: Normal breath sounds. No wheezing. No rhonchi. No rales.   Cardiovascular:      PMI at left midclavicular line. Normal rate. Regular rhythm. Normal S1. Normal S2.       Murmurs: There is no murmur.      No gallop.  No click. No rub.   Pulses:     Intact distal pulses.   Abdominal:      General: Bowel sounds are normal.      Palpations: Abdomen is soft.      Tenderness: There is no abdominal tenderness.   Musculoskeletal: Normal range of motion.         General: No tenderness.      Cervical back: Normal range of motion. Skin:     General: Skin is warm and dry.   Neurological:      General: No focal deficit present.      Mental Status: Alert and oriented to person, place and time.      Cranial Nerves: Cranial nerves 2-12 are intact.         Diagnostic Data:  Lab Results   Component Value Date    CHLPL 126 10/09/2014    TRIG 181 (H) 12/19/2023    HDL 48 12/19/2023     Lab Results   Component Value Date    GLUCOSE 90 11/27/2024    BUN 17 11/27/2024    CREATININE 0.99 11/27/2024     11/27/2024    K 3.6 11/27/2024     11/27/2024    CO2 28.0 11/27/2024     Lab Results   Component Value Date    HGBA1C 5.30 02/02/2024     Lab Results   Component Value Date    WBC 5.23 11/27/2024    HGB 15.9 11/27/2024    HCT 48.1 11/27/2024     11/27/2024         ECG 12 Lead    Date/Time: 1/9/2025 12:12 PM  Performed by: Cordell Archer MD    Authorized by: Cordell Archer MD  Comparison: not compared with previous ECG   Rhythm: sinus rhythm    Clinical impression: normal  ECG          Advance Care Planning   ACP discussion was declined by the patient. Patient does not have an advance directive, declines further assistance.         ASSESSMENT:  Diagnoses and all orders for this visit:    1. Chronic chest pain with high risk for CAD (Primary)  -     CT Angiogram Coronary; Future  -     CT Angiogram Coronary-Cardiology Interpretation; Future  -     metoprolol tartrate (LOPRESSOR) tablet 200 mg  -     metoprolol tartrate (LOPRESSOR) tablet 150 mg  -     metoprolol tartrate (LOPRESSOR) tablet 100 mg  -     metoprolol tartrate (LOPRESSOR) tablet 50 mg  -     metoprolol tartrate (LOPRESSOR) tablet 25 mg  -     metoprolol tartrate (LOPRESSOR) tablet 50 mg  -     metoprolol tartrate (LOPRESSOR) injection 5 mg  -     nitroglycerin (NITROSTAT) SL tablet 0.4 mg  -     nitroglycerin (NITROSTAT) SL tablet 0.8 mg  -     ivabradine HCl (CORLANOR) tablet 15 mg  -     No Caffeine or Nicotine 4 Hours Prior to CTA Appointment  -     Nothing to Eat or Drink 4 Hours Prior to CTA Appointment  -     Do Not Take Phosphodiasterase Inhibitors in the 72 Hours Prior to Coronary CTA  -     Obtain Informed Consent - Computed Tomography Angiography of Chest - Angiogram of Coronary Arteries; Standing  -     Vital Signs Upon Arrival; Standing  -     Cardiac Monitoring; Standing  -     Verify NPO Status - Patient to Be NPO at Least 4 Hours Prior to CTA; Standing  -     Notify CT After Administration of metoprolol tartrate (LOPRESSOR) tablet; Standing  -     Notify Provider If Total Metoprolol Given Equals 300mg & Heart Rate Not At Goal; Standing  -     Notify Provider Prior to Administration of Nitroglycerin if Patient SBP <80; Standing  -     POC Creatinine; Standing  -     Insert Peripheral IV; Standing  -     Saline Lock & Maintain IV Access; Standing  -     sodium chloride 0.9 % flush 10 mL  -     sodium chloride 0.9 % flush 10 mL  -     sodium chloride 0.9 % infusion 40 mL  -     Vital Signs - See Instructions;  Standing  -     Hold Medication Metformin (Glucophage, Glucophage XR, Fortament, Glumetza);  Metglip (metformin/glipizide);  Glucovance (metformin/glyburide); Avandamet (metformin/rosiglitazone); Standing  -     Patient May Discharge Home After Procedure Complete (If Stable); Standing  -     metoprolol tartrate (LOPRESSOR) 100 MG tablet; Take 100 mg at Bedtime the Night Before Coronary CTA Appointment and In the Morning 1 Hour Prior to Coronary CTA Appointment. Do not take if heart rate less than 60.  Dispense: 2 tablet; Refill: 0  -     Adult Transthoracic Echo Complete W/ Cont if Necessary Per Protocol; Future  -     Holter Monitor - 72 Hour Up To 15 Days; Future    2. Mixed hyperlipidemia    3. Essential hypertension, benign    4. Dyspnea on exertion    5. Dizziness        PLAN:    Patient undergo echocardiogram to evaluate for structural heart disease  Patient undergo cardiac CTA for further evaluation of his dyspnea given multiple risk factors for significant coronary artery disease given obesity and hypertension hyperlipidemia  Patient undergo 14-day patch monitor given dizziness however likely significant orthostatic component  Further recommendations based upon above testing   Return in about 6 weeks (around 2/20/2025).     Cordell Archer MD

## 2025-01-10 LAB
PSA FREE MFR SERPL: 18.7 %
PSA FREE SERPL-MCNC: 1.18 NG/ML
PSA SERPL-MCNC: 6.3 NG/ML (ref 0–4)

## 2025-01-14 NOTE — PROGRESS NOTES
Chief Complaint  New Patient, Snoring, Witnessed Apnea, Daytime Sleepiness, Frequent Awakenings, Dry Mouth, Unable To Stay Asleep, and Unable To Fall Asleep    Subjective     History of Present Illness:  Jay Saunders is a 59 y.o. male with hypertension, arthritis, and anxiety.  The patient is referred by Hank Roque MD. The patient was recently told by anesthesia that he may have sleep apnea.  Review of self-reported questionnaire notes symptoms including snoring, witnessed apnea, excessive daytime sleepiness and fatigue, difficulty falling and staying asleep, dry mouth, sore throat, history of broken nose, frequent nighttime urination, pain at night which have been ongoing for more than 5 years.  Patient reports going to bed at 9:30 PM waking at 5 AM on weekdays and 8 AM on weekends.  He estimates an average of 7 hours of sleep per night and it takes approximately 20 to 30 minutes for him to get to sleep.  He takes an occasional nap.  He has a remote history of smoking, drinks alcohol 2-4 times per month, and denies use of illicit or recreational drugs.  He drinks 1 regular coffee, and 1 regular tea, and 1 diet decaf cola daily    Further details are as follows:    [place] Scale is (out of 24): Total score: 12     Estimated average amount of sleep per night: 6.5-7 hours  Number of times he wakes up at night: 1-2 times  Difficulty falling back asleep: yes  It usually takes 20-30 minutes to go to sleep.  He feels sleepy upon waking up: yes  Rotating or night shift work: no    Drowsiness/Sleepiness:  He exhibits the following:  excessive daytime sleepiness  excessive daytime fatigue  falls asleep watching TV  Occasional nap    Snoring/Breathing:  He exhibits the following:  loud snoring, quits breathing at night, awakens with dry mouth, sore throat when waking up in the morning, and morning headaches    Head Injury:  He exhibits the following:  Yes. 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 pt had large surgical boot on)      Weight change in the last year:  gain: 10 lbs    The patient's relevant past medical, surgical, family, and social history reviewed and updated in Epic as appropriate.    Review of Systems   Constitutional: Negative.    HENT:  Positive for hearing loss and tinnitus.    Eyes: Negative.    Respiratory:  Positive for cough.    Cardiovascular:  Positive for leg swelling.   Gastrointestinal: Negative.    Endocrine: Negative.    Genitourinary: Negative.    Musculoskeletal:  Positive for back pain, gait problem and joint swelling.   Skin: Negative.    Allergic/Immunologic: Negative.    Neurological:  Positive for dizziness and numbness.   Hematological: Negative.    Psychiatric/Behavioral: Negative.     All other systems reviewed and are negative.      PMH:    Past Medical History:   Diagnosis Date    Acromioclavicular separation     Acute maxillary sinusitis     Ankle sprain     Anxiety     Arthritis of back     Arthritis of neck     Bursitis of hip     Cervical disc disorder     Dislocation, shoulder     Essential hypertension, benign     Fracture of hip     Fracture of wrist     Fracture, femur     Fracture, finger     Fracture, humerus     Hyperlipidemia     Knee swelling     Low back strain     Lumbar spondylosis     Lumbosacral disc disease     Neuroma of foot     Osteoarthritis     Periarthritis of shoulder     Rotator cuff syndrome     Routine general medical examination at a health care facility     Shoulder pain     Special screening for malignant neoplasm of prostate     Spinal stenosis     Tear of meniscus of knee      Thoracic disc disorder     Wrist sprain      Past Surgical History:   Procedure Laterality Date    BACK SURGERY      Discectomy lumbar    EYE SURGERY Bilateral     Lasik    JOINT REPLACEMENT Right     hip and shoulder    MTP JOINT FUSION Right 12/3/2024    Procedure: FOOT FIRST MTP FUSION RIGHT;  Surgeon: Nathanael Harvey MD;  [facility]: [facility];  Service: Orthopedics;  Laterality: Right;    ROTATOR CUFF REPAIR Right     SHOULDER SURGERY Right     labrum repair    VASECTOMY      WISDOM TOOTH EXTRACTION      WRIST SURGERY Left     tendon repair       Allergies   Allergen Reactions    Percocet [Oxycodone-Acetaminophen] Nausea Only       MEDS:  Prior to Admission medications    Medication Sig Start Date End Date Taking? Authorizing Provider   aspirin 81 MG EC tablet Take 1 tablet by mouth Daily.    ProviderProsper MD   citalopram (CeleXA) 20 MG tablet Take 1 tablet by mouth once daily 1/7/25   Argentina Osborn PA-C   Cyanocobalamin (VITAMIN B 12 PO) Take  by mouth Daily.    Prosper Rocha MD   diclofenac (VOLTAREN) 75 MG EC tablet Take 1 tablet by mouth twice daily 1/7/25   Hank Roque MD   dilTIAZem (TIAZAC) 240 MG 24 hr capsule Take 1 capsule by mouth Daily. 1/7/25   Hank Roque MD   lisinopril-hydrochlorothiazide (PRINZIDE,ZESTORETIC) 20-12.5 MG per tablet Take 2 tablets by mouth Daily. 1/7/25   Hank Roque MD   metoprolol tartrate (LOPRESSOR) 100 MG tablet Take 100 mg at Bedtime the Night Before Coronary CTA Appointment and In the Morning 1 Hour Prior to Coronary CTA Appointment. Do not take if heart rate less than 60. 1/9/25   Cordell Archer MD   multivitamin with minerals (MULTIVITAMIN ADULT PO) Take 1 tablet by mouth Daily.    ProviderProsper MD   pantoprazole (PROTONIX) 40 MG EC tablet Take 1 tablet by mouth Daily. 1/7/25   Hank Roque MD   simvastatin (ZOCOR) 40 MG tablet Take 1 tablet by mouth once daily 1/7/25    "Argentina Osborn, ELDA   tamsulosin (FLOMAX) 0.4 MG capsule 24 hr capsule Take 1 capsule by mouth Daily. 9/5/24   Ananth San MD       FH:  Family History   Problem Relation Age of Onset    Thyroid disease Mother     Hypertension Father     Diabetes Father     Diabetes Maternal Grandmother     Cancer Paternal Grandmother        Objective   Vital Signs:  /74   Pulse 84   Temp 98.6 °F (37 °C) (Oral)   Ht 177.8 cm (70\")   Wt 112 kg (246 lb) Comment: per pt, didn't weigh. pt had large surgical boot on  SpO2 96%   BMI 35.30 kg/m²     Class 2 Severe Obesity (BMI >=35 and <=39.9). Obesity-related health conditions include the following: obstructive sleep apnea, hypertension, coronary heart disease, and diabetes mellitus. Obesity is worsening. BMI is is above average; BMI management plan is completed. We discussed portion control and increasing exercise.        Physical Exam  Vitals reviewed.   Constitutional:       Appearance: Normal appearance.   HENT:      Head: Normocephalic and atraumatic.      Nose: Nose normal.      Mouth/Throat:      Mouth: Mucous membranes are moist.   Cardiovascular:      Rate and Rhythm: Normal rate and regular rhythm.      Heart sounds: No murmur heard.     No friction rub. No gallop.   Pulmonary:      Effort: Pulmonary effort is normal. No respiratory distress.      Breath sounds: Normal breath sounds. No wheezing or rhonchi.   Neurological:      Mental Status: He is alert and oriented to person, place, and time.   Psychiatric:         Behavior: Behavior normal.       Mallampati Score: IV (only hard palate visible)    Result Review :              Assessment and Plan  Jay Saunders is a 59 y.o. male with hypertension, arthritis, and anxiety who presents for further evaluation of excessive daytime sleepiness and fatigue, nonrestorative sleep, and concerns for sleep disordered breathing and obstructive sleep apnea. The patient's symptoms, particularly snoring and witnessed " apneas, are concerning for significant sleep disordered breathing and obstructive sleep apnea. We will obtain a home sleep test for further evaluation.  The patient will return for follow-up and recommendations after test.  I have discussed weight loss as it pertains to obstructive sleep apnea.  Patient reports that he tosses and turns throughout the night primarily with joint pain.  He is concerned that he will not be able to utilize a PAP device because of this.  We have discussed alternatives including MAD, and surgical consult along with the inspire device.    Diagnoses and all orders for this visit:    1. Sleep apnea, unspecified type (Primary)  -     Home Sleep Study; Future    2. Snoring  -     Home Sleep Study; Future    3. Excessive daytime sleepiness  -     Home Sleep Study; Future    4. Morbid (severe) obesity due to excess calories                 I discussed the consequences of uncontrolled sleep apnea including hypertension, heart disease, diabetes, stroke, and dementia. I further discussed sleep apnea therapeutic options including CPAP, Weight loss, Oral dental appliance, and surgery.         Follow Up  Return for Follow up after study.  Patient was given instructions and counseling regarding his condition or for health maintenance advice. Please see specific information pulled into the AVS if appropriate.     CHAUNCEY Betancourt, ACNP-BC  Pulmonology, Critical Care, and Sleep Medicine

## 2025-01-17 ENCOUNTER — OFFICE VISIT (OUTPATIENT)
Dept: SLEEP MEDICINE | Age: 60
End: 2025-01-17
Payer: COMMERCIAL

## 2025-01-17 VITALS
TEMPERATURE: 98.6 F | HEART RATE: 84 BPM | BODY MASS INDEX: 35.22 KG/M2 | SYSTOLIC BLOOD PRESSURE: 120 MMHG | HEIGHT: 70 IN | WEIGHT: 246 LBS | OXYGEN SATURATION: 96 % | DIASTOLIC BLOOD PRESSURE: 74 MMHG

## 2025-01-17 DIAGNOSIS — E66.01 MORBID (SEVERE) OBESITY DUE TO EXCESS CALORIES: ICD-10-CM

## 2025-01-17 DIAGNOSIS — G47.30 SLEEP APNEA, UNSPECIFIED TYPE: Primary | ICD-10-CM

## 2025-01-17 DIAGNOSIS — R06.83 SNORING: ICD-10-CM

## 2025-01-17 DIAGNOSIS — G47.19 EXCESSIVE DAYTIME SLEEPINESS: ICD-10-CM

## 2025-01-20 ENCOUNTER — TELEPHONE (OUTPATIENT)
Age: 60
End: 2025-01-20
Payer: COMMERCIAL

## 2025-01-20 ENCOUNTER — PREP FOR SURGERY (OUTPATIENT)
Dept: OTHER | Facility: HOSPITAL | Age: 60
End: 2025-01-20
Payer: COMMERCIAL

## 2025-01-20 DIAGNOSIS — R97.20 ELEVATED PROSTATE SPECIFIC ANTIGEN (PSA): Primary | ICD-10-CM

## 2025-01-23 ENCOUNTER — TELEPHONE (OUTPATIENT)
Dept: UROLOGY | Facility: CLINIC | Age: 60
End: 2025-01-23

## 2025-01-23 NOTE — TELEPHONE ENCOUNTER
Caller: Jay Saunders A    Relationship to patient: Self    Best call back number: [phone]    Chief complaint: PT CALLED IN TO RESCHEDULE SURGERY, WILL BE OUT OF TOWN ON CURRENT DATE SCHEDULED    Type of visit: SURGERY    Requested date: WOULD LIKE TO HAVE IT A FEW DAYS EARLIER THAN CURRENT DATE OR END OF MARCH    If rescheduling, when is the original appointment: 2/21/25     Additional notes: OK TO LEAVE A VM

## 2025-01-24 DIAGNOSIS — I10 ESSENTIAL HYPERTENSION: ICD-10-CM

## 2025-01-24 RX ORDER — DILTIAZEM HYDROCHLORIDE 240 MG/1
240 CAPSULE, EXTENDED RELEASE ORAL DAILY
Qty: 90 CAPSULE | Refills: 3 | Status: SHIPPED | OUTPATIENT
Start: 2025-01-24

## 2025-01-27 ENCOUNTER — TELEPHONE (OUTPATIENT)
Dept: UROLOGY | Facility: CLINIC | Age: 60
End: 2025-01-27
Payer: COMMERCIAL

## 2025-01-29 ENCOUNTER — TELEPHONE (OUTPATIENT)
Dept: CARDIOLOGY | Facility: CLINIC | Age: 60
End: 2025-01-29

## 2025-01-29 ENCOUNTER — OFFICE VISIT (OUTPATIENT)
Dept: ORTHOPEDIC SURGERY | Facility: CLINIC | Age: 60
End: 2025-01-29
Payer: COMMERCIAL

## 2025-01-29 DIAGNOSIS — Z09 SURGERY FOLLOW-UP: Primary | ICD-10-CM

## 2025-01-29 PROCEDURE — 99024 POSTOP FOLLOW-UP VISIT: CPT | Performed by: ORTHOPAEDIC SURGERY

## 2025-01-29 NOTE — PROGRESS NOTES
[facility] Orthopaedic Surgery Office Follow Up     Office Follow Up Visit     Date: 01/29/2025   Patient Name: Jay Saunders  MRN: [id number]  YOB: 1965  Chief Complaint:   Chief Complaint   Patient presents with    Post-op Follow-up     6 week follow up - 8 weeks S/P Foot First Mtp Fusion Right (DOS: 12/3/24)     History of Present Illness:   Jay Saunders is a 59 y.o. male who is here today for 8-week follow-up status post right first MTP fusion.  Has been primarily heel weightbearing in boot.  Denies any pain at all when ambulating in boot.  Continues to be very happy with his surgical result.  States pain much improved since prior to surgery.  Is happy with the progress she is making with no new complaints.    Subjective   I reviewed the patient's chief complaint, history of present illness, review of systems, past medical history, surgical history, family history, social history, medications and allergy list   Objective    Vital Signs: There were no vitals filed for this visit.  There is no height or weight on file to calculate BMI.    Ortho Exam:  right LE Foot and Ankle Exam:   shoe removed for exam.  Leg compartments soft and compressible.  Foot incisions well-healed.  Able to actively plantarflex and dorsiflex ankle and all toes.  Appropriate swelling for this stage of healing about the foot.  Toes warm and well-perfused.  Brisk cap refill in all toes.  Ambulates in clinic with normal nonantalgic gait.    Results Review:  XR Foot 3+ View Right  Right Foot X-Ray 01/29/25   Indication: Pain  Views: 3 simulated weight bearing , comparison to previous  Findings: xrays reviewed by me today in the office and show hardware in   place in proper alignment, no signs of hardware failure, continued signs   of consolidation at first MTP fusion site      Assessment / Plan    Assessment/Plan:   Diagnoses and all orders for this visit:    1. Surgery follow-up (Primary)  -     XR  Foot 3+ View Right      Patient is now about 8 weeks out from his postop and continues to do extremely well.  Is very happy with his results so far with no new complaints.  Patient can now wean out of cam boot and into a normal shoe as his symptoms allow.  Counseled patient to slowly increase his activity.  Will plan to see patient back in 4 weeks for repeat x-rays and reevaluation.  And happy with his progress.  It was a pleasure seeing him today.    Follow Up:   Return in about 4 weeks (around 2/26/2025) for F/u Recheck with images.      Nathanael Harvey MD  [facility] Orthopedic Surgeon

## 2025-01-29 NOTE — TELEPHONE ENCOUNTER
----- Message from Cordell Archer sent at 1/29/2025 12:51 PM EST -----  Minimal ectopy no changes for now

## 2025-02-04 ENCOUNTER — HOSPITAL ENCOUNTER (OUTPATIENT)
Facility: HOSPITAL | Age: 60
Discharge: HOME | End: 2025-02-04
Payer: COMMERCIAL

## 2025-02-04 VITALS
SYSTOLIC BLOOD PRESSURE: 110 MMHG | WEIGHT: 241.62 LBS | HEIGHT: 70 IN | DIASTOLIC BLOOD PRESSURE: 81 MMHG | TEMPERATURE: 97.5 F | RESPIRATION RATE: 13 BRPM | OXYGEN SATURATION: 97 % | HEART RATE: 49 BPM | BODY MASS INDEX: 34.59 KG/M2

## 2025-02-04 VITALS — SYSTOLIC BLOOD PRESSURE: 121 MMHG | DIASTOLIC BLOOD PRESSURE: 83 MMHG

## 2025-02-04 DIAGNOSIS — R07.9 CHRONIC CHEST PAIN WITH HIGH RISK FOR CAD: ICD-10-CM

## 2025-02-04 DIAGNOSIS — Z91.89 CHRONIC CHEST PAIN WITH HIGH RISK FOR CAD: ICD-10-CM

## 2025-02-04 DIAGNOSIS — G89.29 CHRONIC CHEST PAIN WITH HIGH RISK FOR CAD: ICD-10-CM

## 2025-02-04 LAB
ASCENDING AORTA: 3.5 CM
AV MEAN PRESS GRAD SYS DOP V1V2: 4 MMHG
AV VMAX SYS DOP: 134.7 CM/SEC
BH CV ECHO MEAS - AI P1/2T: 1120 MSEC
BH CV ECHO MEAS - AO MAX PG: 7.3 MMHG
BH CV ECHO MEAS - AO ROOT DIAM: 2.9 CM
BH CV ECHO MEAS - AO V2 VTI: 33.3 CM
BH CV ECHO MEAS - AVA(I,D): 2.7 CM2
BH CV ECHO MEAS - EDV(CUBED): 91.1 ML
BH CV ECHO MEAS - EDV(MOD-SP2): 105 ML
BH CV ECHO MEAS - EDV(MOD-SP4): 140 ML
BH CV ECHO MEAS - EF(MOD-SP2): 61.4 %
BH CV ECHO MEAS - EF(MOD-SP4): 61 %
BH CV ECHO MEAS - ESV(CUBED): 22 ML
BH CV ECHO MEAS - ESV(MOD-SP2): 40.5 ML
BH CV ECHO MEAS - ESV(MOD-SP4): 54.6 ML
BH CV ECHO MEAS - FS: 37.8 %
BH CV ECHO MEAS - IVS/LVPW: 1 CM
BH CV ECHO MEAS - IVSD: 1 CM
BH CV ECHO MEAS - LA DIMENSION: 4.2 CM
BH CV ECHO MEAS - LAT PEAK E' VEL: 12.4 CM/SEC
BH CV ECHO MEAS - LV DIASTOLIC VOL/BSA (35-75): 61.4 CM2
BH CV ECHO MEAS - LV MASS(C)D: 153.3 GRAMS
BH CV ECHO MEAS - LV MAX PG: 4.3 MMHG
BH CV ECHO MEAS - LV MEAN PG: 2 MMHG
BH CV ECHO MEAS - LV SYSTOLIC VOL/BSA (12-30): 24 CM2
BH CV ECHO MEAS - LV V1 MAX: 103.5 CM/SEC
BH CV ECHO MEAS - LV V1 VTI: 26 CM
BH CV ECHO MEAS - LVIDD: 4.5 CM
BH CV ECHO MEAS - LVIDS: 2.8 CM
BH CV ECHO MEAS - LVOT AREA: 3.5 CM2
BH CV ECHO MEAS - LVOT DIAM: 2.1 CM
BH CV ECHO MEAS - LVPWD: 1 CM
BH CV ECHO MEAS - MED PEAK E' VEL: 9.7 CM/SEC
BH CV ECHO MEAS - MV A MAX VEL: 58.3 CM/SEC
BH CV ECHO MEAS - MV DEC SLOPE: 114 CM/SEC2
BH CV ECHO MEAS - MV DEC TIME: 0.34 SEC
BH CV ECHO MEAS - MV E MAX VEL: 73.9 CM/SEC
BH CV ECHO MEAS - MV E/A: 1.27
BH CV ECHO MEAS - MV MAX PG: 3 MMHG
BH CV ECHO MEAS - MV MEAN PG: 1 MMHG
BH CV ECHO MEAS - MV P1/2T: 219.9 MSEC
BH CV ECHO MEAS - MV V2 VTI: 47.2 CM
BH CV ECHO MEAS - MVA(P1/2T): 1 CM2
BH CV ECHO MEAS - MVA(VTI): 1.91 CM2
BH CV ECHO MEAS - PA ACC TIME: 0.18 SEC
BH CV ECHO MEAS - PA V2 MAX: 84.1 CM/SEC
BH CV ECHO MEAS - SV(LVOT): 90.1 ML
BH CV ECHO MEAS - SV(MOD-SP2): 64.5 ML
BH CV ECHO MEAS - SV(MOD-SP4): 85.4 ML
BH CV ECHO MEAS - SVI(LVOT): 39.5 ML/M2
BH CV ECHO MEAS - SVI(MOD-SP2): 28.3 ML/M2
BH CV ECHO MEAS - SVI(MOD-SP4): 37.5 ML/M2
BH CV ECHO MEAS - TAPSE (>1.6): 2.09 CM
BH CV ECHO MEAS - TR MAX PG: 29 MMHG
BH CV ECHO MEAS - TR MAX VEL: 267 CM/SEC
BH CV ECHO MEASUREMENTS AVERAGE E/E' RATIO: 6.69
BH CV XLRA - RV BASE: 3.9 CM
BH CV XLRA - RV LENGTH: 7.7 CM
BH CV XLRA - RV MID: 3 CM
BH CV XLRA - TDI S': 8.6 CM/SEC
LEFT ATRIUM VOLUME INDEX: 21.1 ML/M2
LV EF BIPLANE MOD: 60.2 %

## 2025-02-04 PROCEDURE — 93306 TTE W/DOPPLER COMPLETE: CPT | Performed by: INTERNAL MEDICINE

## 2025-02-04 PROCEDURE — 93306 TTE W/DOPPLER COMPLETE: CPT

## 2025-02-04 PROCEDURE — 75574 CT ANGIO HRT W/3D IMAGE: CPT | Performed by: INTERNAL MEDICINE

## 2025-02-04 PROCEDURE — 75574 CT ANGIO HRT W/3D IMAGE: CPT

## 2025-02-04 PROCEDURE — 25510000001 IOPAMIDOL PER 1 ML: Performed by: INTERNAL MEDICINE

## 2025-02-04 RX ORDER — METOPROLOL TARTRATE 25 MG/1
25 TABLET, FILM COATED ORAL ONCE
Status: DISCONTINUED | OUTPATIENT
Start: 2025-02-04 | End: 2025-02-05 | Stop reason: HOSPADM

## 2025-02-04 RX ORDER — METOPROLOL TARTRATE 25 MG/1
50 TABLET, FILM COATED ORAL ONCE
Status: DISCONTINUED | OUTPATIENT
Start: 2025-02-04 | End: 2025-02-05 | Stop reason: HOSPADM

## 2025-02-04 RX ORDER — METOPROLOL TARTRATE 100 MG/1
200 TABLET ORAL ONCE
Status: DISCONTINUED | OUTPATIENT
Start: 2025-02-04 | End: 2025-02-05 | Stop reason: HOSPADM

## 2025-02-04 RX ORDER — METOPROLOL TARTRATE 25 MG/1
50 TABLET, FILM COATED ORAL
Status: DISCONTINUED | OUTPATIENT
Start: 2025-02-04 | End: 2025-02-05 | Stop reason: HOSPADM

## 2025-02-04 RX ORDER — SODIUM CHLORIDE 9 MG/ML
40 INJECTION, SOLUTION INTRAVENOUS AS NEEDED
Status: DISCONTINUED | OUTPATIENT
Start: 2025-02-04 | End: 2025-02-05 | Stop reason: HOSPADM

## 2025-02-04 RX ORDER — IOPAMIDOL 755 MG/ML
70 INJECTION, SOLUTION INTRAVASCULAR
Status: COMPLETED | OUTPATIENT
Start: 2025-02-04 | End: 2025-02-04

## 2025-02-04 RX ORDER — SODIUM CHLORIDE 0.9 % (FLUSH) 0.9 %
10 SYRINGE (ML) INJECTION EVERY 12 HOURS SCHEDULED
Status: DISCONTINUED | OUTPATIENT
Start: 2025-02-04 | End: 2025-02-05 | Stop reason: HOSPADM

## 2025-02-04 RX ORDER — NITROGLYCERIN 0.4 MG/1
0.4 TABLET SUBLINGUAL
Status: COMPLETED | OUTPATIENT
Start: 2025-02-04 | End: 2025-02-04

## 2025-02-04 RX ORDER — METOPROLOL TARTRATE 100 MG/1
100 TABLET ORAL ONCE
Status: DISCONTINUED | OUTPATIENT
Start: 2025-02-04 | End: 2025-02-05 | Stop reason: HOSPADM

## 2025-02-04 RX ORDER — METOPROLOL TARTRATE 1 MG/ML
5 INJECTION, SOLUTION INTRAVENOUS
Status: DISCONTINUED | OUTPATIENT
Start: 2025-02-04 | End: 2025-02-05 | Stop reason: HOSPADM

## 2025-02-04 RX ORDER — IVABRADINE 5 MG/1
15 TABLET, FILM COATED ORAL ONCE
Status: DISCONTINUED | OUTPATIENT
Start: 2025-02-04 | End: 2025-02-05 | Stop reason: HOSPADM

## 2025-02-04 RX ORDER — SODIUM CHLORIDE 0.9 % (FLUSH) 0.9 %
10 SYRINGE (ML) INJECTION AS NEEDED
Status: DISCONTINUED | OUTPATIENT
Start: 2025-02-04 | End: 2025-02-05 | Stop reason: HOSPADM

## 2025-02-04 RX ORDER — NITROGLYCERIN 0.4 MG/1
0.8 TABLET SUBLINGUAL
Status: COMPLETED | OUTPATIENT
Start: 2025-02-04 | End: 2025-02-04

## 2025-02-04 RX ADMIN — Medication 10 ML: at 08:54

## 2025-02-04 RX ADMIN — IOPAMIDOL 70 ML: 755 INJECTION, SOLUTION INTRAVENOUS at 09:09

## 2025-02-04 RX ADMIN — NITROGLYCERIN 0.8 MG: 0.4 TABLET SUBLINGUAL at 08:54

## 2025-02-07 ENCOUNTER — TELEPHONE (OUTPATIENT)
Dept: CARDIOLOGY | Facility: CLINIC | Age: 60
End: 2025-02-07
Payer: COMMERCIAL

## 2025-02-07 NOTE — TELEPHONE ENCOUNTER
ATTEMPTED TO CONTACT PATIENT LVM TO LET IT BE KNOWN APPT WITH DR. LLAMAS ON 2/11 HAS BEEN CANCELED DUE TO MURIEL BEING OOO.

## 2025-02-14 ENCOUNTER — TELEPHONE (OUTPATIENT)
Dept: CARDIOLOGY | Facility: CLINIC | Age: 60
End: 2025-02-14
Payer: COMMERCIAL

## 2025-02-14 NOTE — TELEPHONE ENCOUNTER
He had a normal coronary  CTA.  He can proceed at low risk.   Can hold ASA 24 hours prior.    Hold lisinopril-HCTZ and diclofenac 24 hours prior also.

## 2025-02-14 NOTE — TELEPHONE ENCOUNTER
Received fax from [facility] requesting clearance for pt.    He is scheduled to have a Left scaphoid excision, 4 corner fusion and ICBG, ECRL to ECU transfer on 3/3/25. This will be under axillary block with mild sedation.     They are requesting clearance, last office note, and any recent testing be sent to their office. If pt is on any medication that needs to be stopped prior to surgery, they want that provided as well.    If clearance is given, will fax to [phone], attn: Claudia, Clinical Assistant.    I spoke to pt. He states that he has not had any new, or worsening, chest pain or shortness of breath. He is feeling well overall. He is taking ASA 81mg daily.    Please advise about clearance.

## 2025-02-26 ENCOUNTER — OFFICE VISIT (OUTPATIENT)
Dept: ORTHOPEDIC SURGERY | Facility: CLINIC | Age: 60
End: 2025-02-26
Payer: COMMERCIAL

## 2025-02-26 DIAGNOSIS — Z09 SURGERY FOLLOW-UP: Primary | ICD-10-CM

## 2025-02-26 PROCEDURE — 99024 POSTOP FOLLOW-UP VISIT: CPT | Performed by: ORTHOPAEDIC SURGERY

## 2025-02-26 NOTE — PROGRESS NOTES
[facility] [place] [facility] Office Follow Up     Office Follow Up Visit     Date: 02/26/2025   Patient Name: Jay Saunders  MRN: [id number]  YOB: 1965  Chief Complaint:   Chief Complaint   Patient presents with    Post-op     4 weeks follow up--2.5 month S/P Foot First Mtp Fusion Right (DOS: 12/3/24)     History of Present Illness:   Jay Saunders is a 59 y.o. male who is here today for 2-1/2-month follow-up status post right first MTP fusion.  Is now ambulating in a normal shoe.  Denies pain.  No complaints.  Very happy with his surgical result.    Subjective   I reviewed the patient's chief complaint, history of present illness, review of systems, past medical history, surgical history, family history, social history, medications and allergy list   Objective    Vital Signs: There were no vitals filed for this visit.  There is no height or weight on file to calculate BMI.    Ortho Exam:  right LE Foot and Ankle Exam:   shoe removed for exam.  Leg compartments soft and compressible.  Foot incisions well-healed.  Able to actively plantarflex and dorsiflex ankle and all toes.  Appropriate swelling for this stage of healing about the foot.  Toes warm and well-perfused.  Brisk cap refill in all toes.  Ambulates in clinic with normal nonantalgic gait.    Results Review:  XR Foot 3+ View Right  Right Foot X-Ray 02/26/25   Indication: Pain  Views: 3 simulated weight bearing , comparison to previous  Findings: xrays reviewed by me today in the office and show, hardware in   place with proper alignment, no signs of hardware failure, continued signs   of consolidation at first MTP fusion site       Assessment / Plan    Assessment/Plan:   Diagnoses and all orders for this visit:    1. Surgery follow-up (Primary)  -     XR Foot 3+ View Right      Patient is now 2-1/2 months out from his surgery and continues to do extremely well.  Continues to be very happy with his surgical result.   Patient can continue to increase his activity as his symptoms allow.  Will plan to see patient back in 6 months for repeat x-rays and reevaluation.  Continue home exercises.  And happy with his progress.  Is a pleasure seeing him today.    Follow Up:   Return in about 6 months (around 8/26/2025) for F/u Recheck with images.      Nathanael Harvey MD  [facility] [place] [facility]

## 2025-03-04 ENCOUNTER — OFFICE VISIT (OUTPATIENT)
Dept: CARDIOLOGY | Facility: CLINIC | Age: 60
End: 2025-03-04
Payer: COMMERCIAL

## 2025-03-04 VITALS
WEIGHT: 241 LBS | SYSTOLIC BLOOD PRESSURE: 92 MMHG | DIASTOLIC BLOOD PRESSURE: 64 MMHG | HEART RATE: 77 BPM | HEIGHT: 70 IN | BODY MASS INDEX: 34.5 KG/M2 | OXYGEN SATURATION: 95 %

## 2025-03-04 DIAGNOSIS — R42 DIZZINESS: Primary | ICD-10-CM

## 2025-03-04 DIAGNOSIS — I10 ESSENTIAL HYPERTENSION, BENIGN: Chronic | ICD-10-CM

## 2025-03-04 DIAGNOSIS — I47.10 PAROXYSMAL SVT (SUPRAVENTRICULAR TACHYCARDIA): ICD-10-CM

## 2025-03-04 DIAGNOSIS — E78.2 MIXED HYPERLIPIDEMIA: Chronic | ICD-10-CM
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
## 2025-03-04 NOTE — ASSESSMENT & PLAN NOTE
Dizziness with exertion, sometimes with dyspnea.  Cardiac testing to date has been relatively unremarkable.  Holter showed no significant arrhythmias or blocks.  Echo relatively normal with only mild valvular regurgitation.  Cardiac CTA showed no CAD or coronary calcium  I feel that this is probably overmedication with his antihypertensives.  On diltiazem and 2 pills of lisinopril/HCTZ 20/12.5  Orthostatics today were negative but blood pressures are quite low, especially for his age  ECG today relatively normal.  Only a slight left axis deviation but overall no significant changes compared to previous tracing  Decrease lisinopril HCTZ to 1 pill daily  Maintain good hydration  Will call back in about 2 weeks to update on symptoms.  If persistent, will continue to back off medication depending on blood pressures.  Would recommend ENT evaluation for vertigo with primary care  Ordered carotid duplex, although no bruits on exam today  May need to consider stress test to determine hypertensive response with exercise if symptoms ongoing after the above interventions    Orders:    Duplex Carotid Ultrasound CAR; Future    ECG 12 Lead

## 2025-03-04 NOTE — ASSESSMENT & PLAN NOTE
Blood pressures may be too low for him.  Goal less than 130/80.  Systolics in the 90s on orthostatics today.  Decrease lisinopril/HCTZ per above.    Continue with diltiazem

## 2025-03-04 NOTE — ASSESSMENT & PLAN NOTE
Some PSVT's and NSVT on monitor but otherwise no malignant arrhythmias.  Has occasional palpitations that do not seem to be related.  No indication for beta-blocker for now.  Will continue diltiazem.

## 2025-03-04 NOTE — ASSESSMENT & PLAN NOTE
LDL reasonably controlled for primary prevention.  No coronary calcium on CT.  Will need to reassess his cardiovascular risk at a later time (once the above has been addressed) to determine long-term statin use  Continue statin for now

## 2025-03-05 ENCOUNTER — HOSPITAL ENCOUNTER (OUTPATIENT)
Facility: HOSPITAL | Age: 60
Discharge: HOME | End: 2025-03-05
Payer: COMMERCIAL

## 2025-03-05 ENCOUNTER — LAB (OUTPATIENT)
Facility: HOSPITAL | Age: 60
End: 2025-03-05
Payer: COMMERCIAL

## 2025-03-05 VITALS
BODY MASS INDEX: 34.5 KG/M2 | DIASTOLIC BLOOD PRESSURE: 77 MMHG | HEIGHT: 70 IN | WEIGHT: 241 LBS | SYSTOLIC BLOOD PRESSURE: 131 MMHG

## 2025-03-05 DIAGNOSIS — R97.20 ELEVATED PROSTATE SPECIFIC ANTIGEN (PSA): ICD-10-CM

## 2025-03-05 DIAGNOSIS — R42 DIZZINESS: ICD-10-CM

## 2025-03-05 LAB
BH CV XLRA MEAS LEFT DIST CCA EDV: 22 CM/SEC
BH CV XLRA MEAS LEFT DIST CCA PSV: 86.2 CM/SEC
BH CV XLRA MEAS LEFT DIST ICA EDV: 21.1 CM/SEC
BH CV XLRA MEAS LEFT DIST ICA PSV: 63.4 CM/SEC
BH CV XLRA MEAS LEFT ICA/CCA RATIO: 0.66
BH CV XLRA MEAS LEFT MID CCA EDV: 16.8 CM/SEC
BH CV XLRA MEAS LEFT MID CCA PSV: 98.8 CM/SEC
BH CV XLRA MEAS LEFT MID ICA EDV: 23.6 CM/SEC
BH CV XLRA MEAS LEFT MID ICA PSV: 76.3 CM/SEC
BH CV XLRA MEAS LEFT PROX CCA PSV: 125 CM/SEC
BH CV XLRA MEAS LEFT PROX ECA PSV: 88.2 CM/SEC
BH CV XLRA MEAS LEFT PROX ICA EDV: 26.3 CM/SEC
BH CV XLRA MEAS LEFT PROX ICA PSV: 82.3 CM/SEC
BH CV XLRA MEAS LEFT PROX SCLA PSV: 168 CM/SEC
BH CV XLRA MEAS LEFT VERTEBRAL A EDV: 15.8 CM/SEC
BH CV XLRA MEAS LEFT VERTEBRAL A PSV: 68.9 CM/SEC
BH CV XLRA MEAS RIGHT DIST CCA EDV: 25.9 CM/SEC
BH CV XLRA MEAS RIGHT DIST CCA PSV: 91.8 CM/SEC
BH CV XLRA MEAS RIGHT DIST ICA EDV: 32.9 CM/SEC
BH CV XLRA MEAS RIGHT DIST ICA PSV: 90.4 CM/SEC
BH CV XLRA MEAS RIGHT ICA/CCA RATIO: 0.82
BH CV XLRA MEAS RIGHT MID CCA EDV: 24.3 CM/SEC
BH CV XLRA MEAS RIGHT MID CCA PSV: 109 CM/SEC
BH CV XLRA MEAS RIGHT MID ICA EDV: 36.8 CM/SEC
BH CV XLRA MEAS RIGHT MID ICA PSV: 113 CM/SEC
BH CV XLRA MEAS RIGHT PROX CCA EDV: 24.3 CM/SEC
BH CV XLRA MEAS RIGHT PROX CCA PSV: 138 CM/SEC
BH CV XLRA MEAS RIGHT PROX ECA PSV: 105 CM/SEC
BH CV XLRA MEAS RIGHT PROX ICA EDV: 21.7 CM/SEC
BH CV XLRA MEAS RIGHT PROX ICA PSV: 72.9 CM/SEC
BH CV XLRA MEAS RIGHT PROX SCLA PSV: 163 CM/SEC
BH CV XLRA MEAS RIGHT VERTEBRAL A EDV: 19.2 CM/SEC
BH CV XLRA MEAS RIGHT VERTEBRAL A PSV: 67.5 CM/SEC
LEFT ARM BP: NORMAL MMHG
RIGHT ARM BP: NORMAL MMHG

## 2025-03-05 PROCEDURE — 85027 COMPLETE CBC AUTOMATED: CPT

## 2025-03-05 PROCEDURE — 84154 ASSAY OF PSA FREE: CPT

## 2025-03-05 PROCEDURE — 93880 EXTRACRANIAL BILAT STUDY: CPT

## 2025-03-05 PROCEDURE — 36415 COLL VENOUS BLD VENIPUNCTURE: CPT

## 2025-03-05 PROCEDURE — 80048 BASIC METABOLIC PNL TOTAL CA: CPT

## 2025-03-05 PROCEDURE — 84153 ASSAY OF PSA TOTAL: CPT

## 2025-03-06 LAB
ANION GAP SERPL CALCULATED.3IONS-SCNC: 13.6 MMOL/L (ref 5–15)
BUN SERPL-MCNC: 18 MG/DL (ref 6–20)
BUN/CREAT SERPL: 18 (ref 7–25)
CALCIUM SPEC-SCNC: 9.3 MG/DL (ref 8.6–10.5)
CHLORIDE SERPL-SCNC: 104 MMOL/L (ref 98–107)
CO2 SERPL-SCNC: 26.4 MMOL/L (ref 22–29)
CREAT SERPL-MCNC: 1 MG/DL (ref 0.76–1.27)
DEPRECATED RDW RBC AUTO: 42.9 FL (ref 37–54)
EGFRCR SERPLBLD CKD-EPI 2021: 87.8 ML/MIN/1.73
ERYTHROCYTE [DISTWIDTH] IN BLOOD BY AUTOMATED COUNT: 12.6 % (ref 12.3–15.4)
GLUCOSE SERPL-MCNC: 97 MG/DL (ref 65–99)
HCT VFR BLD AUTO: 47.7 % (ref 37.5–51)
HGB BLD-MCNC: 16.1 G/DL (ref 13–17.7)
MCH RBC QN AUTO: 31 PG (ref 26.6–33)
MCHC RBC AUTO-ENTMCNC: 33.8 G/DL (ref 31.5–35.7)
MCV RBC AUTO: 91.7 FL (ref 79–97)
PLATELET # BLD AUTO: 233 10*3/MM3 (ref 140–450)
PMV BLD AUTO: 9.6 FL (ref 6–12)
POTASSIUM SERPL-SCNC: 3.6 MMOL/L (ref 3.5–5.2)
RBC # BLD AUTO: 5.2 10*6/MM3 (ref 4.14–5.8)
SODIUM SERPL-SCNC: 144 MMOL/L (ref 136–145)
WBC NRBC COR # BLD AUTO: 5.28 10*3/MM3 (ref 3.4–10.8)

## 2025-03-07 LAB
PSA FREE MFR SERPL: 17.4 %
PSA FREE SERPL-MCNC: 0.92 NG/ML
PSA SERPL-MCNC: 5.3 NG/ML (ref 0–4)

## 2025-03-10 ENCOUNTER — OFFICE VISIT (OUTPATIENT)
Age: 60
End: 2025-03-10
Payer: COMMERCIAL

## 2025-03-10 DIAGNOSIS — R97.20 ELEVATED PROSTATE SPECIFIC ANTIGEN (PSA): Primary | ICD-10-CM

## 2025-03-10 PROCEDURE — 99214 OFFICE O/P EST MOD 30 MIN: CPT | Performed by: STUDENT IN AN ORGANIZED HEALTH CARE EDUCATION/TRAINING PROGRAM

## 2025-03-10 RX ORDER — BENZONATATE 100 MG/1
100 CAPSULE ORAL 3 TIMES DAILY PRN
COMMUNITY

## 2025-03-10 RX ORDER — PREDNISONE 5 MG/1
5 TABLET ORAL DAILY
COMMUNITY

## 2025-03-10 RX ORDER — SODIUM PHOSPHATE, DIBASIC AND SODIUM PHOSPHATE, MONOBASIC, UNSPECIFIED FORM 7; 19 G/118ML; G/118ML
1 ENEMA RECTAL ONCE
Qty: 1 EACH | Refills: 0 | Status: SHIPPED | OUTPATIENT
Start: 2025-03-10 | End: 2025-03-10

## 2025-03-10 RX ORDER — LEVOFLOXACIN 750 MG/1
750 TABLET, FILM COATED ORAL DAILY
Qty: 1 TABLET | Refills: 0 | Status: SHIPPED | OUTPATIENT
Start: 2025-03-20 | End: 2025-03-21

## 2025-03-10 RX ORDER — ALBUTEROL SULFATE 90 UG/1
2 INHALANT RESPIRATORY (INHALATION) EVERY 4 HOURS PRN
COMMUNITY

## 2025-03-10 RX ORDER — DOXYCYCLINE 50 MG/1
50 CAPSULE ORAL 2 TIMES DAILY
COMMUNITY

## 2025-03-10 NOTE — PROGRESS NOTES
Elevated PSA Office Visit      Patient Name: Jay Saunders  : 1965   MRN: [id number]     Chief Complaint:  Elevated PSA.    Chief Complaint   Patient presents with    Elevated PSA       Referring Provider: Hank Roque*    History of Present Illness: Jay Saunders is a 59 y.o. male who presents today with history of elevated PSA.  Select MDX urinary testing came back very low risk 2024.  PSA since this time has remained elevated and variable.  Had risen as high as 6.3 in January.  Now back down to 5.3.  He is amenable to proceeding with a prostate biopsy which has been scheduled for  at the OR.  We would prefer to get an MRI prostate however he has hip replacement and this will cause issues with imaging.  He states he has multiple friends recently diagnosed with prostate cancer and this has weighed on his mind.  He would prefer to go ahead and get a biopsy despite low risk features on his select MDX urinary biomarker screening.  He does have a history of BPH managing with tamsulosin.  Urinary symptoms are relatively stable.    Lab Results   Component Value Date    PSA 5.3 (H) 2025    PSA 6.3 (H) 2025    PSA 5.4 (H) 2024    PSA 4.9 (H) 2024    PSA 4.920 (H) 2023    PSA 2.980 2022         Subjective      Review of System: Review of Systems   Genitourinary:  Positive for frequency. Negative for decreased urine volume, difficulty urinating, dysuria, enuresis, flank pain, hematuria and urgency.      I have reviewed the ROS documented by my clinical staff, I have updated appropriately and I agree. Ananth San MD    Past Medical History:   Past Medical History:   Diagnosis Date    Acromioclavicular separation     Acute maxillary sinusitis     Ankle sprain     Anxiety     Arthritis of back     Arthritis of neck     Bursitis of hip     Cervical disc disorder     Dislocation, shoulder     Essential hypertension, benign     Fracture of  hip     Fracture of wrist     Fracture, femur     Fracture, finger     Fracture, humerus     Hyperlipidemia     Knee swelling     Low back strain     Lumbar spondylosis     Lumbosacral disc disease     Neuroma of foot     Osteoarthritis     Periarthritis of shoulder     Rotator cuff syndrome     Routine general medical examination at a health care facility     Shoulder pain     Special screening for malignant neoplasm of prostate     Spinal stenosis     Tear of meniscus of knee     Thoracic disc disorder     Wrist sprain        Past Surgical History:   Past Surgical History:   Procedure Laterality Date    BACK SURGERY      Discectomy lumbar    EYE SURGERY Bilateral     Lasik    JOINT REPLACEMENT Right     hip and shoulder    MTP JOINT FUSION Right 12/3/2024    Procedure: FOOT FIRST MTP FUSION RIGHT;  Surgeon: Nathanael Harvey MD;  Location: [facility];  Service: Orthopedics;  Laterality: Right;    ROTATOR CUFF REPAIR Right     SHOULDER SURGERY Right     labrum repair    VASECTOMY      WISDOM TOOTH EXTRACTION      WRIST SURGERY Left     tendon repair       Family History:   Family History   Problem Relation Age of Onset    Thyroid disease Mother     Hypertension Father     Diabetes Father     Diabetes Maternal Grandmother     Cancer Paternal Grandmother        Social History:   Social History     Socioeconomic History    Marital status:    Tobacco Use    Smoking status: Former     Current packs/day: 0.00     Average packs/day: 1 pack/day for 10.8 years (10.8 ttl pk-yrs)     Types: Cigarettes     Start date: 1985     Quit date: 10/13/1995     Years since quittin.4     Passive exposure: Past    Smokeless tobacco: Never   Vaping Use    Vaping status: Never Used   Substance and Sexual Activity    Alcohol use: No     Comment: rarely    Drug use: No    Sexual activity: Yes     Partners: Female     Birth control/protection: Post-menopausal, Vasectomy, Hysterectomy       Medications:     Current Outpatient  Medications:     albuterol sulfate  (90 Base) MCG/ACT inhaler, Inhale 2 puffs Every 4 (Four) Hours As Needed for Wheezing., Disp: , Rfl:     aspirin 81 MG EC tablet, Take 1 tablet by mouth Daily., Disp: , Rfl:     benzonatate (TESSALON) 100 MG capsule, Take 1 capsule by mouth 3 (Three) Times a Day As Needed for Cough., Disp: , Rfl:     citalopram (CeleXA) 20 MG tablet, Take 1 tablet by mouth once daily, Disp: 90 tablet, Rfl: 3    Cyanocobalamin (VITAMIN B 12 PO), Take  by mouth Daily., Disp: , Rfl:     diclofenac (VOLTAREN) 75 MG EC tablet, Take 1 tablet by mouth twice daily, Disp: 180 tablet, Rfl: 0    dilTIAZem (TIAZAC) 240 MG 24 hr capsule, Take 1 capsule by mouth Daily., Disp: 90 capsule, Rfl: 3    doxycycline (MONODOX) 50 MG capsule, Take 1 capsule by mouth 2 (Two) Times a Day., Disp: , Rfl:     lisinopril-hydrochlorothiazide (PRINZIDE,ZESTORETIC) 20-12.5 MG per tablet, Take 2 tablets by mouth Daily., Disp: 180 tablet, Rfl: 3    multivitamin with minerals (MULTIVITAMIN ADULT PO), Take 1 tablet by mouth Daily., Disp: , Rfl:     pantoprazole (PROTONIX) 40 MG EC tablet, Take 1 tablet by mouth Daily., Disp: 90 tablet, Rfl: 3    predniSONE (DELTASONE) 5 MG tablet, Take 1 tablet by mouth Daily., Disp: , Rfl:     simvastatin (ZOCOR) 40 MG tablet, Take 1 tablet by mouth once daily, Disp: 90 tablet, Rfl: 3    tamsulosin (FLOMAX) 0.4 MG capsule 24 hr capsule, Take 1 capsule by mouth Daily., Disp: 90 capsule, Rfl: 3    [START ON 3/20/2025] levoFLOXacin (Levaquin) 750 MG tablet, Take 1 tablet by mouth Daily for 1 day., Disp: 1 tablet, Rfl: 0    sodium phosphate (FLEET) enema ADULT enema, Insert 1 Application into the rectum 1 (One) Time for 1 dose. Use morning of prostate biopsy, Disp: 1 each, Rfl: 0    Allergies:   Allergies   Allergen Reactions    Percocet [Oxycodone-Acetaminophen] Nausea Only       IPSS Questionnaire (AUA-7):  Over the past month…    1)  Incomplete Emptying:       How often have you had a  sensation of not emptying you had the sensation of not emptying your bladder completely after you finished urinating?  1 - Less than 1 time in 5   2)  Frequency:       How often have you had the urinate again less than two hours after you finished urinating?  1 - Less than 1 time in 5   3)  Intermittency:       How often have you found you stopped and started again several times when you urinated?   2 - Less than half the time   4) Urgency:      How often have you found it difficult to postpone urination?  0 - Not at all   5) Weak Stream:      How often have you had a weak urinary stream?  3 - About half the time   6) Straining:       How often have you had to push or strain to begin urination?  0 - Not at all   7) Nocturia:      How many times did you most typically get up to urinate from the time you went to bed at night until the time you got up in the morning?  1 - 1 time   Total Score:  8   The International Prostate Symptom Score (IPSS) is used to screen, diagnose, track symptoms of benign prostatic hyperplasia (BPH).   0-7 (Mild Symptoms) 8-19 (Moderate) 20-35 (Severe)   Quality of Life (QoL):  If you were to spend the rest of your life with your urinary condition just the way it is now, how would you feel about that? 2-Mostly Satisfied   Urine Leakage (Incontinence) 0-No Leakage            Objective     Physical Exam:   Vital Signs: There were no vitals filed for this visit.  There is no height or weight on file to calculate BMI.     Physical Exam  Constitutional:       Appearance: Normal appearance.   HENT:      Head: Normocephalic and atraumatic.      Nose: Nose normal.   Eyes:      Extraocular Movements: Extraocular movements intact.      Conjunctiva/sclera: Conjunctivae normal.      Pupils: Pupils are equal, round, and reactive to light.   Musculoskeletal:         General: Normal range of motion.      Cervical back: Normal range of motion and neck supple.   Skin:     General: Skin is warm and dry.       Findings: No lesion or rash.   Neurological:      General: No focal deficit present.      Mental Status: He is alert and oriented to person, place, and time. Mental status is at baseline.   Psychiatric:         Mood and Affect: Mood normal.         Behavior: Behavior normal.         Labs:   Hematocrit (%)   Date Value   03/05/2025 47.7   01/09/2025 48.8   11/27/2024 48.1   02/02/2024 46.1   12/19/2023 47.5   05/31/2023 48.1       Lab Results   Component Value Date    PSA 5.3 (H) 03/05/2025    PSA 6.3 (H) 01/09/2025    PSA 5.4 (H) 08/16/2024       Images:   CT Angiogram Coronary  Result Date: 2/6/2025  Impression: No significant noncardiac findings within the field-of-view. Please refer to CT Angiogram Coronary   Cardiology Interp report for information on cardiac structures. Electronically Signed: Esvin Chao MD  2/6/2025 8:26 AM EST  Workstation ID: [id number]    CT Angiogram Coronary-Cardiology Interpretation  Result Date: 2/4/2025  Total calcium score 0 indicating absence of calcified coronary plaque. Normal coronary arteries (0%) . CAD RADS 0. No non-atherosclerotic coronary abnormalities. LV systolic function (calculated LVEF 67%). Please refer to the radiology report for information on extra-cardiac structures.  RECOMMENDATION: Continue risk factor modification Grading Scale for Stenosis Severity: Category Degree Interpretation CAD-RADS 0 0% (No plaque or stenosis) Absence of CAD CAD-RADS 1 1-24% (Minimal stenosis or plaque w/o stenosis) Minimal non-obstructive CAD CAD-RADS 2 25-49% (Mild stenosis) Mild non-obstructive CAD CAD-RADS 3 50-69% (Moderate stenosis) Moderate stenosis CAD-RADS 4 A - 70-99% stenosis or B - Left main >/= 50% or 3-vessel obstructive (>/=70%) disease Severe stenosis CAD-RADS 5 100% (total occlusion) Total coronary occlusion or sub-total occlusion CAD-RADS N Non-diagnostic study Obstructive CAD cannot be excluded Grading Scale for Plaque Colstrip: P1 (CAC 1-100) Mild amount of plaque P2  (-300) Moderate amount of plaque P3 (-999) Severe amount of plaque P4 (CAC > 1000) Extensive amount of plaque        Measures:   Tobacco:   Jay Saunders  reports that he quit smoking about 29 years ago. His smoking use included cigarettes. He started smoking about 40 years ago. He has a 10.8 pack-year smoking history. He has been exposed to tobacco smoke. He has never used smokeless tobacco.      Assessment / Plan      Assessment/Plan:   Mr. Saunders is a 59 y.o. male with elevated PSA.  Thorough PSA trend demonstrates PSA has remained elevated.  This could be due to BPH, prostatitis or malignancy.  Select MDX urinary biomarker screening came back in very low risk last year.  Given persistently elevated PSA patient is amenable to proceeding with a transperineal prostate biopsy in the operating room, risks discussed thoroughly.  I will pretreat him with Levaquin and fleets enema prior.  Risks include bleeding, dysuria, infection or sepsis, anesthesia complications.  All the patient's questions have been answered    Diagnoses and all orders for this visit:    1. Elevated prostate specific antigen (PSA) (Primary)  -     levoFLOXacin (Levaquin) 750 MG tablet; Take 1 tablet by mouth Daily for 1 day.  Dispense: 1 tablet; Refill: 0  -     sodium phosphate (FLEET) enema ADULT enema; Insert 1 Application into the rectum 1 (One) Time for 1 dose. Use morning of prostate biopsy  Dispense: 1 each; Refill: 0              Follow Up:   Return for He's scheduled for prostate biopsy in OR 3/21 .    I spent approximately 30 minutes providing clinical care for this patient; including review of patient's chart and provider documentation, face to face time spent with patient in examination room (obtaining history, performing physical exam, discussing diagnosis and management options), placing orders, and completing patient documentation.     Ananth San MD  [facility] [place] [facility] [place]

## 2025-03-13 ENCOUNTER — HOSPITAL ENCOUNTER (OUTPATIENT)
Dept: SLEEP MEDICINE | Facility: HOSPITAL | Age: 60
Discharge: HOME | End: 2025-03-13
Admitting: NURSE PRACTITIONER
Payer: COMMERCIAL

## 2025-03-13 VITALS — WEIGHT: 240.3 LBS | BODY MASS INDEX: 34.4 KG/M2 | HEIGHT: 70 IN

## 2025-03-13 DIAGNOSIS — R06.83 SNORING: ICD-10-CM

## 2025-03-13 DIAGNOSIS — G47.19 EXCESSIVE DAYTIME SLEEPINESS: ICD-10-CM

## 2025-03-13 DIAGNOSIS — G47.30 SLEEP APNEA, UNSPECIFIED TYPE: ICD-10-CM

## 2025-03-13 PROCEDURE — G0399 HOME SLEEP TEST/TYPE 3 PORTA: HCPCS

## 2025-03-16 PROCEDURE — 95806 SLEEP STUDY UNATT&RESP EFFT: CPT | Performed by: INTERNAL MEDICINE

## 2025-03-17 ENCOUNTER — TELEPHONE (OUTPATIENT)
Dept: SLEEP MEDICINE | Age: 60
End: 2025-03-17
Payer: COMMERCIAL

## 2025-03-17 NOTE — TELEPHONE ENCOUNTER
Patient is understanding of their sleep study results and is agreeable to PAP therapy. They would like to use [facility]. Orders have been faxed to [facility].

## 2025-03-21 ENCOUNTER — ANESTHESIA EVENT (OUTPATIENT)
Dept: PERIOP | Facility: HOSPITAL | Age: 60
End: 2025-03-21
Payer: COMMERCIAL

## 2025-03-21 ENCOUNTER — HOSPITAL ENCOUNTER (OUTPATIENT)
Facility: HOSPITAL | Age: 60
Setting detail: HOSPITAL OUTPATIENT SURGERY
Discharge: HOME | End: 2025-03-21
Attending: STUDENT IN AN ORGANIZED HEALTH CARE EDUCATION/TRAINING PROGRAM | Admitting: STUDENT IN AN ORGANIZED HEALTH CARE EDUCATION/TRAINING PROGRAM
Payer: COMMERCIAL

## 2025-03-21 ENCOUNTER — ANESTHESIA (OUTPATIENT)
Dept: PERIOP | Facility: HOSPITAL | Age: 60
End: 2025-03-21
Payer: COMMERCIAL

## 2025-03-21 VITALS
SYSTOLIC BLOOD PRESSURE: 136 MMHG | HEIGHT: 70 IN | TEMPERATURE: 98 F | BODY MASS INDEX: 34.36 KG/M2 | HEART RATE: 52 BPM | WEIGHT: 240 LBS | DIASTOLIC BLOOD PRESSURE: 88 MMHG | OXYGEN SATURATION: 96 % | RESPIRATION RATE: 14 BRPM

## 2025-03-21 DIAGNOSIS — R97.20 ELEVATED PROSTATE SPECIFIC ANTIGEN (PSA): ICD-10-CM

## 2025-03-21 LAB — POTASSIUM SERPL-SCNC: 3.9 MMOL/L (ref 3.5–5.2)

## 2025-03-21 PROCEDURE — 25010000002 ONDANSETRON PER 1 MG: Performed by: NURSE ANESTHETIST, CERTIFIED REGISTERED

## 2025-03-21 PROCEDURE — 55700 PR PROSTATE NEEDLE BIOPSY ANY APPROACH: CPT | Performed by: STUDENT IN AN ORGANIZED HEALTH CARE EDUCATION/TRAINING PROGRAM

## 2025-03-21 PROCEDURE — 84132 ASSAY OF SERUM POTASSIUM: CPT | Performed by: ANESTHESIOLOGY

## 2025-03-21 PROCEDURE — 25010000002 CEFAZOLIN PER 500 MG: Performed by: STUDENT IN AN ORGANIZED HEALTH CARE EDUCATION/TRAINING PROGRAM

## 2025-03-21 PROCEDURE — 25010000002 DEXAMETHASONE PER 1 MG: Performed by: NURSE ANESTHETIST, CERTIFIED REGISTERED

## 2025-03-21 PROCEDURE — 25010000002 LIDOCAINE PF 1% 1 % SOLUTION: Performed by: NURSE ANESTHETIST, CERTIFIED REGISTERED

## 2025-03-21 PROCEDURE — 25010000002 FENTANYL CITRATE (PF) 100 MCG/2ML SOLUTION: Performed by: NURSE ANESTHETIST, CERTIFIED REGISTERED

## 2025-03-21 PROCEDURE — 25010000002 PROPOFOL 10 MG/ML EMULSION: Performed by: NURSE ANESTHETIST, CERTIFIED REGISTERED

## 2025-03-21 PROCEDURE — 76942 ECHO GUIDE FOR BIOPSY: CPT | Performed by: STUDENT IN AN ORGANIZED HEALTH CARE EDUCATION/TRAINING PROGRAM

## 2025-03-21 PROCEDURE — 25010000002 LIDOCAINE PF 1% 1 % SOLUTION: Performed by: ANESTHESIOLOGY

## 2025-03-21 PROCEDURE — 25810000003 LACTATED RINGERS PER 1000 ML: Performed by: NURSE ANESTHETIST, CERTIFIED REGISTERED

## 2025-03-21 PROCEDURE — 25810000003 LACTATED RINGERS PER 1000 ML: Performed by: ANESTHESIOLOGY

## 2025-03-21 PROCEDURE — G0416 PROSTATE BIOPSY, ANY MTHD: HCPCS | Performed by: STUDENT IN AN ORGANIZED HEALTH CARE EDUCATION/TRAINING PROGRAM

## 2025-03-21 PROCEDURE — 25010000002 LIDOCAINE 1 % SOLUTION: Performed by: STUDENT IN AN ORGANIZED HEALTH CARE EDUCATION/TRAINING PROGRAM

## 2025-03-21 RX ORDER — LIDOCAINE HYDROCHLORIDE 10 MG/ML
INJECTION, SOLUTION INFILTRATION; PERINEURAL AS NEEDED
Status: DISCONTINUED | OUTPATIENT
Start: 2025-03-21 | End: 2025-03-21 | Stop reason: HOSPADM

## 2025-03-21 RX ORDER — DEXAMETHASONE SODIUM PHOSPHATE 4 MG/ML
INJECTION, SOLUTION INTRA-ARTICULAR; INTRALESIONAL; INTRAMUSCULAR; INTRAVENOUS; SOFT TISSUE AS NEEDED
Status: DISCONTINUED | OUTPATIENT
Start: 2025-03-21 | End: 2025-03-21 | Stop reason: SURG

## 2025-03-21 RX ORDER — ONDANSETRON 2 MG/ML
INJECTION INTRAMUSCULAR; INTRAVENOUS AS NEEDED
Status: DISCONTINUED | OUTPATIENT
Start: 2025-03-21 | End: 2025-03-21 | Stop reason: SURG

## 2025-03-21 RX ORDER — HYDROMORPHONE HYDROCHLORIDE 1 MG/ML
0.5 INJECTION, SOLUTION INTRAMUSCULAR; INTRAVENOUS; SUBCUTANEOUS
Status: DISCONTINUED | OUTPATIENT
Start: 2025-03-21 | End: 2025-03-21 | Stop reason: HOSPADM

## 2025-03-21 RX ORDER — FENTANYL CITRATE 50 UG/ML
50 INJECTION, SOLUTION INTRAMUSCULAR; INTRAVENOUS
Status: DISCONTINUED | OUTPATIENT
Start: 2025-03-21 | End: 2025-03-21 | Stop reason: HOSPADM

## 2025-03-21 RX ORDER — PROPOFOL 10 MG/ML
VIAL (ML) INTRAVENOUS AS NEEDED
Status: DISCONTINUED | OUTPATIENT
Start: 2025-03-21 | End: 2025-03-21 | Stop reason: SURG

## 2025-03-21 RX ORDER — SODIUM CHLORIDE, SODIUM LACTATE, POTASSIUM CHLORIDE, CALCIUM CHLORIDE 600; 310; 30; 20 MG/100ML; MG/100ML; MG/100ML; MG/100ML
9 INJECTION, SOLUTION INTRAVENOUS ONCE
Status: COMPLETED | OUTPATIENT
Start: 2025-03-21 | End: 2025-03-21

## 2025-03-21 RX ORDER — SODIUM CHLORIDE 0.9 % (FLUSH) 0.9 %
10 SYRINGE (ML) INJECTION AS NEEDED
Status: DISCONTINUED | OUTPATIENT
Start: 2025-03-21 | End: 2025-03-21 | Stop reason: HOSPADM

## 2025-03-21 RX ORDER — LIDOCAINE HYDROCHLORIDE 10 MG/ML
0.5 INJECTION, SOLUTION EPIDURAL; INFILTRATION; INTRACAUDAL; PERINEURAL ONCE AS NEEDED
Status: COMPLETED | OUTPATIENT
Start: 2025-03-21 | End: 2025-03-21

## 2025-03-21 RX ORDER — SODIUM CHLORIDE 0.9 % (FLUSH) 0.9 %
10 SYRINGE (ML) INJECTION EVERY 12 HOURS SCHEDULED
Status: DISCONTINUED | OUTPATIENT
Start: 2025-03-21 | End: 2025-03-21 | Stop reason: HOSPADM

## 2025-03-21 RX ORDER — ONDANSETRON 2 MG/ML
4 INJECTION INTRAMUSCULAR; INTRAVENOUS ONCE AS NEEDED
Status: DISCONTINUED | OUTPATIENT
Start: 2025-03-21 | End: 2025-03-21 | Stop reason: HOSPADM

## 2025-03-21 RX ORDER — FAMOTIDINE 20 MG/1
20 TABLET, FILM COATED ORAL
Status: COMPLETED | OUTPATIENT
Start: 2025-03-21 | End: 2025-03-21

## 2025-03-21 RX ORDER — FENTANYL CITRATE 50 UG/ML
INJECTION, SOLUTION INTRAMUSCULAR; INTRAVENOUS AS NEEDED
Status: DISCONTINUED | OUTPATIENT
Start: 2025-03-21 | End: 2025-03-21 | Stop reason: SURG

## 2025-03-21 RX ORDER — SODIUM CHLORIDE 9 MG/ML
40 INJECTION, SOLUTION INTRAVENOUS AS NEEDED
Status: DISCONTINUED | OUTPATIENT
Start: 2025-03-21 | End: 2025-03-21 | Stop reason: HOSPADM

## 2025-03-21 RX ORDER — LIDOCAINE HYDROCHLORIDE 10 MG/ML
INJECTION, SOLUTION EPIDURAL; INFILTRATION; INTRACAUDAL; PERINEURAL AS NEEDED
Status: DISCONTINUED | OUTPATIENT
Start: 2025-03-21 | End: 2025-03-21 | Stop reason: SURG

## 2025-03-21 RX ORDER — BUPIVACAINE HCL/0.9 % NACL/PF 0.125 %
PLASTIC BAG, INJECTION (ML) EPIDURAL AS NEEDED
Status: DISCONTINUED | OUTPATIENT
Start: 2025-03-21 | End: 2025-03-21 | Stop reason: SURG

## 2025-03-21 RX ORDER — SODIUM CHLORIDE, SODIUM LACTATE, POTASSIUM CHLORIDE, CALCIUM CHLORIDE 600; 310; 30; 20 MG/100ML; MG/100ML; MG/100ML; MG/100ML
INJECTION, SOLUTION INTRAVENOUS CONTINUOUS PRN
Status: DISCONTINUED | OUTPATIENT
Start: 2025-03-21 | End: 2025-03-21 | Stop reason: SURG

## 2025-03-21 RX ADMIN — ONDANSETRON 4 MG: 2 INJECTION INTRAMUSCULAR; INTRAVENOUS at 11:39

## 2025-03-21 RX ADMIN — LIDOCAINE HYDROCHLORIDE 0.5 ML: 10 INJECTION, SOLUTION EPIDURAL; INFILTRATION; INTRACAUDAL; PERINEURAL at 10:22

## 2025-03-21 RX ADMIN — PROPOFOL 200 MG: 10 INJECTION, EMULSION INTRAVENOUS at 11:34

## 2025-03-21 RX ADMIN — LIDOCAINE HYDROCHLORIDE 50 MG: 10 INJECTION, SOLUTION EPIDURAL; INFILTRATION; INTRACAUDAL; PERINEURAL at 11:34

## 2025-03-21 RX ADMIN — SODIUM CHLORIDE, POTASSIUM CHLORIDE, SODIUM LACTATE AND CALCIUM CHLORIDE 9 ML/HR: 600; 310; 30; 20 INJECTION, SOLUTION INTRAVENOUS at 10:22

## 2025-03-21 RX ADMIN — SODIUM CHLORIDE, POTASSIUM CHLORIDE, SODIUM LACTATE AND CALCIUM CHLORIDE: 600; 310; 30; 20 INJECTION, SOLUTION INTRAVENOUS at 11:30

## 2025-03-21 RX ADMIN — FAMOTIDINE 20 MG: 20 TABLET, FILM COATED ORAL at 10:44

## 2025-03-21 RX ADMIN — Medication 200 MCG: at 11:52

## 2025-03-21 RX ADMIN — Medication 200 MCG: at 12:01

## 2025-03-21 RX ADMIN — FENTANYL CITRATE 100 MCG: 0.05 INJECTION, SOLUTION INTRAMUSCULAR; INTRAVENOUS at 11:34

## 2025-03-21 RX ADMIN — SODIUM CHLORIDE 2000 MG: 900 INJECTION INTRAVENOUS at 11:30

## 2025-03-21 RX ADMIN — DEXAMETHASONE SODIUM PHOSPHATE 8 MG: 4 INJECTION, SOLUTION INTRA-ARTICULAR; INTRALESIONAL; INTRAMUSCULAR; INTRAVENOUS; SOFT TISSUE at 11:39

## 2025-03-21 NOTE — OP NOTE
PROSTATE BIOPSY TRANSPERINEAL  Procedure Report    Patient Name:  Jay Saunders  YOB: 1965    Date of Surgery:  3/21/2025     Indications:  58 y/o M with history of elevated PSA. I discussed the possible management options with the patient and the patient elected to undergo transperineal   prostate biopsy.    Pre-op Diagnosis:   Elevated prostate specific antigen (PSA) [R97.20]       Post-Op Diagnosis Codes:     * Elevated prostate specific antigen (PSA) [R97.20]     Procedure(s):  TRANSPERINEAL ULTRASOUND GUIDED BIOPSY OF PROSTATE WITH PRECISION POINT      Staff:  Surgeon(s):  nAanth San MD         Anesthesia: General    Estimated Blood Loss: minimal    Implants:    Nothing was implanted during the procedure    Specimen:          Specimens       ID Source Type Tests Collected By Collected At [facility]?    A Prostate Tissue TISSUE PATHOLOGY EXAM   Ananth San MD 3/21/25 1116     Description: RIGHT POSTERIOR MEDIAL PROSTATE BIOPSY    This specimen was not marked as sent.    B Prostate Tissue TISSUE PATHOLOGY EXAM   Ananth San MD 3/21/25 1116     Description: RIGHT POSTERIOR LATERAL PROSTATE BIOPSY    This specimen was not marked as sent.    C Prostate Tissue TISSUE PATHOLOGY EXAM   Ananth San MD 3/21/25 1116     Description: RIGHT BASE PROSTATE BIOPSY    This specimen was not marked as sent.    D Prostate Tissue TISSUE PATHOLOGY EXAM   Ananth San MD 3/21/25 1116     Description: RIGHT ANTERIOR MEDIAL PROSTATE BIOPSY    This specimen was not marked as sent.    E Prostate Tissue TISSUE PATHOLOGY EXAM   Ananth San MD 3/21/25 1116     Description: RIGHT ANTERIOR LATERAL PROSTATE BIOPSY    This specimen was not marked as sent.    F Prostate Tissue TISSUE PATHOLOGY EXAM   Ananth San MD 3/21/25 1116     Description: LEFT POSTERIOR MEDIAL PROSTATE BIOPSY    This specimen was not marked as sent.    G Prostate Tissue TISSUE PATHOLOGY EXAM    Ananth San MD 3/21/25 1116     Description: LEFT POSTERIOR LATERAL PROSTATE BIOPSY    This specimen was not marked as sent.    H Prostate Tissue TISSUE PATHOLOGY EXAM   Ananth San MD 3/21/25 1116     Description: LEFT BASE PROSTATE BIOPSY    This specimen was not marked as sent.    I Prostate Tissue TISSUE PATHOLOGY EXAM   Ananth San MD 3/21/25 1116     Description: LEFT ANTERIOR MEDIAL PROSTATE BIOPSY    This specimen was not marked as sent.    J Prostate Tissue TISSUE PATHOLOGY EXAM   Ananth San MD 3/21/25 1116     Description: LEFT ANTERIOR LATERAL PROSTATE BIOPSY    This specimen was not marked as sent.             Left posterior medial prostate    Left posterior lateral prostate    Left anterior medial prostate    Left anterior lateral prostate    Left base of prostate    Right posterior medial prostate    Right posterior lateral prostate    Right anterior medial prostate    Right anterior lateral prostate    Right base of prostate    Drains: None    Findings:   Templated prostate biopsy via transperineal approach using precision point.     Complications: None    Description of Procedure:  After patient was identified in the pre-operative holding area, identified, and informed consent was obtained, the patient was brought into the surgical suite and transferred to the operating table. General anesthesia was administered and SCDs were placed on the patient. The patient was placed in the dorsal lithotomy position and patient was prepped in the usual sterile fashion. A timeout was performed confirmed patient, procedure, and laterality. IV antobiotic was given.     The transrectal ultrasound probe was inserted into the patient's rectum. Two marks were made on either side of the median raphe after lining up the two points from the Precision Point anchor.     2 separate skin wheals were raised at the perineum at site of needlepoint entry by injecting  2% lidocaine at the skin  and deep to the perineal tissue.  A spinal needle was then inserted into the perineal tissue and a local block was performed under ultrasound guidance on both the right and left apices of the prostate near the endopelvic musculature. The Precision Point trocar and needle were placed in the anchor and the needle inserted into the patient's perineum at the site of the markings.    Next, a standard 18 core transperineal templated biopsy was performed at locations listed above. The needle and ultrasound probe were then removed. Pressure was applied to the perineal needle sites for hemostasis and the procedure was concluded.    The patient tolerated the procedure well and was awakened from anesthesia without difficulty. The patient was taken to PACU for recovery.     Disposition/Follow Up:      1.  The patient was instructed to drink plenty of fluids and warned about possible complications and side effects including, but not limited to, blood in the urine and semen as well as bloodstream infection.  He was instructed to call the office if there are any issues, especially fevers or flu-like symptoms.    2.  F/u in 1 week to discuss pathology findings.     Ananth San MD     Date: 3/21/2025  Time: 12:14 EDT

## 2025-03-21 NOTE — INTERVAL H&P NOTE
"[facility] Pre-op    Full history and physical note from office is attached.    /93 (BP Location: Right arm, Patient Position: Sitting)   Pulse 61   Temp 97.8 °F (36.6 °C) (Temporal)   Resp 18   Ht 177.8 cm (70\")   Wt 109 kg (240 lb)   SpO2 95%   BMI 34.44 kg/m²     Review of Systems:  Constitutional-- No fever, chills or sweats. No fatigue.  CV-- No chest pain, palpitation or syncope  Resp-- No SOB, cough, hemoptysis  Skin--No rashes or lesions    Physical Exam:  Heart:   Regular rate and rhythm, S1 and S2 normal  Lungs: Clear to auscultation bilaterally, respirations unlabored    LAB Results:  Lab Results   Component Value Date    WBC 5.28 03/05/2025    HGB 16.1 03/05/2025    HCT 47.7 03/05/2025    MCV 91.7 03/05/2025     03/05/2025    NEUTROABS 3.14 01/09/2025    GLUCOSE 97 03/05/2025    BUN 18 03/05/2025    CREATININE 1.00 03/05/2025    EGFRIFNONA 91 03/31/2021     03/05/2025    K 3.6 03/05/2025     03/05/2025    CO2 26.4 03/05/2025    CALCIUM 9.3 03/05/2025    ALBUMIN 4.7 01/09/2025    AST 37 01/09/2025    ALT 63 (H) 01/09/2025    BILITOT 0.7 01/09/2025      Latest Reference Range & Units 03/05/25 15:30   PSA 0.0 - 4.0 ng/mL 5.3 (H)   PSA, Free N/A ng/mL 0.92   PSA, Free % % 17.4   (H): Data is abnormally high    Cancer Staging (if applicable)  Cancer Patient: __ yes __no __unknown__N/A; If yes, clinical stage T:__ N:__M:__, stage group or __N/A      Impression: Elevated prostate specific antigen (PSA)       Plan: TRANSPERINEAL ULTRASOUND GUIDED BIOPSY OF PROSTATE WITH PRECISION POINT       CHAUNCEY Cavanaugh   3/21/2025   10:26 EDT  "

## 2025-03-21 NOTE — ANESTHESIA PROCEDURE NOTES
Airway  Reason: elective    Date/Time: 3/21/2025 11:33 AM  Airway not difficult    General Information and Staff    Patient location during procedure: OR  CRNA/CAA: Edelmira Osborn CRNA    Indications and Patient Condition  Indications for airway management: airway protection    Preoxygenated: yes  MILS not maintained throughout    Mask difficulty assessment: 1 - vent by mask    Final Airway Details    Final airway type: supraglottic airway      Successful airway:   Size: 4   Number of attempts at approach: 1  Assessment: lips, teeth, and gum same as pre-op and atraumatic intubation    Additional Comments  Negative epigastric sounds, Breath sound equal bilaterally with symmetric chest rise and fall

## 2025-03-21 NOTE — ANESTHESIA PREPROCEDURE EVALUATION
Anesthesia Evaluation     Patient summary reviewed and Nursing notes reviewed   NPO Solid Status: > 8 hours  NPO Liquid Status: > 2 hours           Airway   Mallampati: I  TM distance: >3 FB  Neck ROM: full  No difficulty expected  Dental      Pulmonary     breath sounds clear to auscultation  Cardiovascular     Rhythm: regular  Rate: normal    (+) hypertension, hyperlipidemia      Neuro/Psych  (+) psychiatric history Depression  GI/Hepatic/Renal/Endo    (+) obesity    Musculoskeletal     Abdominal    Substance History      OB/GYN          Other   arthritis,                       Anesthesia Plan    ASA 2     general     intravenous induction     Anesthetic plan, risks, benefits, and alternatives have been provided, discussed and informed consent has been obtained with: patient.    Plan discussed with CRNA.      CODE STATUS:

## 2025-03-21 NOTE — ANESTHESIA POSTPROCEDURE EVALUATION
"Patient: Jay Saunders    Procedure Summary       Date: 03/21/25 Room / Location:  [facility]    Anesthesia Start: 1130 Anesthesia Stop: 1218    Procedure: TRANSPERINEAL ULTRASOUND GUIDED BIOPSY OF PROSTATE WITH PRECISION POINT (Bilateral) Diagnosis:       Elevated prostate specific antigen (PSA)      (Elevated prostate specific antigen (PSA) [R97.20])    Surgeons: Ananth San MD Provider: Fausto Jack Jr., MD    Anesthesia Type: general ASA Status: 2            Anesthesia Type: general    Vitals  No vitals data found for the desired time range.          Post Anesthesia Care and Evaluation    Patient location during evaluation: PACU  Patient participation: complete - patient participated  Level of consciousness: awake and responsive to verbal stimuli  Pain score: 2  Pain management: adequate    Airway patency: patent  Anesthetic complications: No anesthetic complications    Cardiovascular status: acceptable  Respiratory status: acceptable  Hydration status: acceptable    Comments: Pt awake and responsive. SV. VSS. Report to RN. Patient Vitals in the past 24 hrs:  03/21/25 1024, BP:157/93, Temp:97.8 °F (36.6 °C), Temp src:Temporal, Pulse:61, Resp:18, SpO2:95 %, Height:177.8 cm (70\"), Weight:109 kg (240 lb)  133/78. p 72. r 16. t 98.1                "

## 2025-03-21 NOTE — DISCHARGE INSTRUCTIONS
Prostate Biopsy Post-Procedure Care/Expectations     Follow these guidelines after your procedure in order to assist with your recovery.    Activity  - Limit yourself to light activity only for 2-3 days after your procedure to prevent rectal and urinary bleeding  - You may return to work in 24 hours     Bleeding  - It is common to notice bleeding in the urine, in the semen after your prostate biopsy   - Urinary bleeding usually stops within a week and is typically light  - Blood in the semen (red discoloration or “paula” discoloration) can persist for up to 6 weeks and this is not inherently harmful to you or your partner     Urination  - You may experience a few days of urinary urgency and frequency after your biopsy which is expected  - Drink plenty of fluid to flush out your bladder and urethra   - If you are unable to urinate for more than 8 hours after your procedure, you may be experiencing urinary retention related to prostatic swelling, and should contact your urologist or present to the nearest emergency department for evaluation and possible urethral catheter placement     Bathing/Showering  - You may resume normal showering and bathing after your procedure     Pain Control  - You will likely have some rectal or pelvic discomfort after your procedure, but this is not typically severe, and very rarely requires the use of narcotics for pain control   - If you experience rectal or pelvic discomfort post-procedure, you should use over the counter Tylenol (Acetaminophen) or Advil/Motrin (Ibuprofen)     Sexual Activity  - Refrain from sexual activity and ejaculation for at least 1 week post-procedure to prevent bleeding and possibly pain    When to call your doctor:     - ANY fever after prostate biopsy is ALWAYS considered significant and should be reported to your urologist right away as this can indicate the possibility of sepsis (bacteria in blood stream) which can be life threatening; the risk of  post-prostate biopsy sepsis is less than 4%     - If you experience any of the following symptoms while at home, call your urologist and make plans to present to the nearest emergency department:     - Fever >100 F, shaking chills, nausea or vomiting, profuse sweating   - If you are unable to urinate for more than 8 hours after your biopsy, call your urologist and present to the nearest emergency department for evaluation

## 2025-03-25 DIAGNOSIS — R97.20 ELEVATED PROSTATE SPECIFIC ANTIGEN (PSA): Primary | ICD-10-CM

## 2025-03-25 LAB
CYTO UR: NORMAL
LAB AP CASE REPORT: NORMAL
LAB AP CLINICAL INFORMATION: NORMAL
PATH REPORT.FINAL DX SPEC: NORMAL
PATH REPORT.GROSS SPEC: NORMAL

## 2025-04-04 RX ORDER — DICLOFENAC SODIUM 75 MG/1
75 TABLET, DELAYED RELEASE ORAL 2 TIMES DAILY
Qty: 180 TABLET | Refills: 1 | Status: SHIPPED | OUTPATIENT
Start: 2025-04-04

## 2025-05-07 ENCOUNTER — OFFICE VISIT (OUTPATIENT)
Dept: CARDIOLOGY | Facility: CLINIC | Age: 60
End: 2025-05-07
Payer: COMMERCIAL

## 2025-05-07 VITALS
BODY MASS INDEX: 35.71 KG/M2 | HEART RATE: 63 BPM | WEIGHT: 249.4 LBS | SYSTOLIC BLOOD PRESSURE: 130 MMHG | OXYGEN SATURATION: 97 % | DIASTOLIC BLOOD PRESSURE: 88 MMHG | HEIGHT: 70 IN

## 2025-05-07 DIAGNOSIS — I10 ESSENTIAL HYPERTENSION, BENIGN: Chronic | ICD-10-CM

## 2025-05-07 DIAGNOSIS — E78.2 MIXED HYPERLIPIDEMIA: Chronic | ICD-10-CM

## 2025-05-07 DIAGNOSIS — I95.1 ORTHOSTASIS: Primary | ICD-10-CM

## 2025-05-07 DIAGNOSIS — I47.10 PAROXYSMAL SVT (SUPRAVENTRICULAR TACHYCARDIA): Chronic | ICD-10-CM

## 2025-05-07 PROCEDURE — 99214 OFFICE O/P EST MOD 30 MIN: CPT | Performed by: INTERNAL MEDICINE

## 2025-05-07 NOTE — PROGRESS NOTES
[facility] Follow Up Note    Jay Saunders  [id number]  2025    Primary Care Provider: Hank Roque MD    Chief Complaint   Patient presents with    Dizziness     2 month follow up       Subjective     History of Present Illness:   Jay Saunders is a 59 y.o. male with hypertension and hyperlipidemia who presents to the [facility]iology [facility] for follow up of his blood pressure.  During his last visit, we decreased his lisinopril HCTZ to 1 pill daily and this basically resolved his dizziness.  He is able to perform all of his daily activities without any shortness of breath or chest pain.  States he feels back to normal.  Tolerating all of his medications without any problems.  No orthopnea, PND, or leg swelling since decreasing his diuretic dose.    Past Cardiac History and Testin.  Dyspnea and dizziness  -- Holter : Brief PSVT and NSVT.  No atrial fibrillation.  [place] of ectopy low.  -- TTE : EF 60%, normal diastology, mild AR, trace to mild MR, mild TR  -- CCTA : CAC 0, no CAD, no significant noncardiac findings  -- Cardiac duplex 3/2025: No stenoses, minimal intimal thickening, no plaque  2.  Hypertension  3.  Hyperlipidemia  4.  DESIREE on CPAP      Review of Systems:  Review of Systems   Constitutional: Negative.    Respiratory: Negative.     Cardiovascular: Negative.    Gastrointestinal: Negative.    Musculoskeletal: Negative.    Neurological: Negative.        I have reviewed and/or updated the patient's past medical, past surgical, family, social history, problem list and allergies as appropriate.       Current Outpatient Medications:     aspirin 81 MG EC tablet, Take 1 tablet by mouth Daily., Disp: , Rfl:     citalopram (CeleXA) 20 MG tablet, Take 1 tablet by mouth once daily, Disp: 90 tablet, Rfl: 3    Cyanocobalamin (VITAMIN B 12 PO), Take  by mouth Daily., Disp: , Rfl:     diclofenac (VOLTAREN) 75 MG EC tablet, Take 1 tablet by mouth  "twice daily, Disp: 180 tablet, Rfl: 1    dilTIAZem (TIAZAC) 240 MG 24 hr capsule, Take 1 capsule by mouth Daily., Disp: 90 capsule, Rfl: 3    lisinopril-hydrochlorothiazide (PRINZIDE,ZESTORETIC) 20-12.5 MG per tablet, Take 2 tablets by mouth Daily. (Patient taking differently: Take 1 tablet by mouth Daily.), Disp: 180 tablet, Rfl: 3    multivitamin with minerals (MULTIVITAMIN ADULT PO), Take 1 tablet by mouth Daily., Disp: , Rfl:     pantoprazole (PROTONIX) 40 MG EC tablet, Take 1 tablet by mouth Daily., Disp: 90 tablet, Rfl: 3    simvastatin (ZOCOR) 40 MG tablet, Take 1 tablet by mouth once daily, Disp: 90 tablet, Rfl: 3    tamsulosin (FLOMAX) 0.4 MG capsule 24 hr capsule, Take 1 capsule by mouth Daily., Disp: 90 capsule, Rfl: 3       Objective     Vitals:  Vitals:    05/07/25 1542   BP: 130/88   BP Location: Left arm   Patient Position: Sitting   Cuff Size: Adult   Pulse: 63   SpO2: 97%   Weight: 113 kg (249 lb 6.4 oz)   Height: 177.8 cm (70\")       Physical Exam:  Vitals reviewed.   Constitutional:       Appearance: Healthy appearance. Not in distress.   Pulmonary:      Effort: Pulmonary effort is normal.      Breath sounds: Normal breath sounds.   Cardiovascular:      Normal rate. Regular rhythm. Normal S1. Normal S2.       Murmurs: There is no murmur.      No gallop.  No click. No rub.   Pulses:     Intact distal pulses.   Edema:     Peripheral edema absent.   Skin:     General: Skin is warm and dry.         Results Review:   I reviewed the patient's new clinical results.  I reviewed the patient's new imaging results and agree with the interpretation.  I reviewed the patient's other test results and agree with the interpretation    Procedures    Most Recent Labs:  None          Assessment & Plan  Orthostasis  Previously orthostatic.  Dizziness and dyspnea resolved with decrease in lisinopril/HCTZ  Previous cardiac workup low risk.  Carotid Dopplers normal.  No further workup indicated         Essential " hypertension, benign  Controlled.  Goal less than 130/80.    Continue lisinopril HCTZ and diltiazem  Continue nightly compliance with CPAP        Paroxysmal SVT (supraventricular tachycardia)  Stable and asymptomatic from this standpoint  Continue diltiazem          Mixed hyperlipidemia  LDL reasonably controlled for primary prevention. CAC   Continue statin                   Plan   Preventative Cardiology:   Tobacco Cessation: N/A  Obstructive Sleep Apnea Screening: Completed    Advanced Care Planning  ACP discussion was held with the patient during this visit. Patient does not have an advance directive, information provided.           Follow Up:   Return in about 1 year (around 5/7/2026).    Thank you for allowing me to participate in the care of your patient. Please do not hesitate to contact me with additional questions or concerns.     Electronically signed by Jacob Edouard MD, 05/07/25, 4:25 PM EDT.

## 2025-05-07 NOTE — ASSESSMENT & PLAN NOTE
Controlled.  Goal less than 130/80.    Continue lisinopril HCTZ and diltiazem  Continue nightly compliance with CPAP

## 2025-08-27 ENCOUNTER — OFFICE VISIT (OUTPATIENT)
Dept: ORTHOPEDIC SURGERY | Facility: CLINIC | Age: 60
End: 2025-08-27
Payer: COMMERCIAL

## 2025-08-27 VITALS
WEIGHT: 250 LBS | DIASTOLIC BLOOD PRESSURE: 82 MMHG | SYSTOLIC BLOOD PRESSURE: 136 MMHG | HEIGHT: 70 IN | BODY MASS INDEX: 35.79 KG/M2

## 2025-08-27 DIAGNOSIS — Z09 SURGERY FOLLOW-UP: Primary | ICD-10-CM

## 2025-08-27 RX ORDER — GABAPENTIN 300 MG/1
300 CAPSULE ORAL 2 TIMES DAILY
Qty: 60 CAPSULE | Refills: 0 | Status: SHIPPED | OUTPATIENT
Start: 2025-08-27

## (undated) DEVICE — MORSELIZING BONE GRAFT HARVESTER, DOOR, 8MM: Brand: BABY GORILLA/GORILLA PLATING SYSTEM

## (undated) DEVICE — UNDERGLV SURG BIOGEL INDICATOR LF PF 7.5

## (undated) DEVICE — COUNTERSINK, 3.5 HEADED: Brand: MONSTER® SCREW SYSTEM

## (undated) DEVICE — DRP C/ARMOR

## (undated) DEVICE — UNDERCAST PADDING: Brand: DEROYAL

## (undated) DEVICE — MTP SPIN GUARD™ FEMALE REAMER, 21MM: Brand: BABY GORILLA®/GORILLA® PLATING SYSTEM

## (undated) DEVICE — DRSNG PAD ABD 8X10IN STRL

## (undated) DEVICE — DRILL,  2.3 X 120MM, CANNULATED, AO: Brand: MONSTER SCREW SYSTEM

## (undated) DEVICE — GLV SURG BIOGEL LTX PF 8

## (undated) DEVICE — PAD,NON-ADHERENT,3X8,STERILE,LF,1/PK: Brand: MEDLINE

## (undated) DEVICE — SHEET,DRAPE,40X58,STERILE: Brand: MEDLINE

## (undated) DEVICE — TOWEL,OR,DSP,ST,BLUE,STD,4/PK,20PK/CS: Brand: MEDLINE

## (undated) DEVICE — BLANKT WARM UPPR/BDY ARM/OUT 57X196CM

## (undated) DEVICE — PATIENT RETURN ELECTRODE, SINGLE-USE, CONTACT QUALITY MONITORING, ADULT, WITH 9FT CORD, FOR PATIENTS WEIGING OVER 33LBS. (15KG): Brand: MEGADYNE

## (undated) DEVICE — GOWN,SIRUS,NONRNF,SETINSLV,2XL,18/CS: Brand: MEDLINE

## (undated) DEVICE — BNDG ELAS CO-FLEX SLF ADHR 6IN 5YD LF STRL

## (undated) DEVICE — PK EXTREM LOWR 10

## (undated) DEVICE — MORSELIZING BONE GRAFT HARVESTER, AO, 8MM: Brand: BABY GORILLA®/GORILLA® PLATING SYSTEM

## (undated) DEVICE — CLTH CLENS READYCLEANSE PERI CARE PK/5

## (undated) DEVICE — MTP SPIN GUARD™ MALE REAMER, 23MM: Brand: BABY GORILLA®/GORILLA® PLATING SYSTEM

## (undated) DEVICE — GLV SURG SENSICARE PI ORTHO SZ7.5 LF STRL

## (undated) DEVICE — DRILL, 2.0 X 110MM, SOLID, MEASURING, AO: Brand: BABY GORILLA®/GORILLA® PLATING SYSTEM

## (undated) DEVICE — MTP SPIN GUARD™ MALE REAMER, 21MM: Brand: BABY GORILLA®/GORILLA® PLATING SYSTEM

## (undated) DEVICE — STCKNT SPECIALIST ORTH COTN 4IN 25YD

## (undated) DEVICE — BONE FENESTRATION PERFORATOR: Brand: BABY GORILLA®/GORILLA® PLATING SYSTEM

## (undated) DEVICE — SYR LL TP 10ML STRL

## (undated) DEVICE — INTENDED USE FOR SURGICAL MARKING ON INTACT SKIN, ALSO PROVIDES A PERMANENT METHOD OF IDENTIFYING OBJECTS IN THE OPERATING ROOM: Brand: WRITESITE® REGULAR TIP SKIN MARKER

## (undated) DEVICE — INTENDED FOR TISSUE SEPARATION, AND OTHER PROCEDURES THAT REQUIRE A SHARP SURGICAL BLADE TO PUNCTURE OR CUT.: Brand: BARD-PARKER ® SAFETYLOCK CARBON RIB-BACK BLADES

## (undated) DEVICE — PAD CAST SOF ROL NS 6IN

## (undated) DEVICE — CONTAINER,SPECIMEN,OR STERILE,4OZ: Brand: MEDLINE

## (undated) DEVICE — BNDG ELAS CO-FLEX SLF ADHR 4IN5YD LF STRL

## (undated) DEVICE — STERILE ULTRASOUND GEL, SAFEWRAP: Brand: ECOVUE

## (undated) DEVICE — SPLNT PLSTR ORTHO 5IN

## (undated) DEVICE — PAD CAST SOF ROL NS 4IN

## (undated) DEVICE — MTP SPIN GUARD™ FEMALE REAMER, 23MM: Brand: BABY GORILLA®/GORILLA® PLATING SYSTEM

## (undated) DEVICE — COVER,MAYO STAND,XL,STERILE: Brand: MEDLINE

## (undated) DEVICE — OLIVE WIRE, SMOOTH, 1.4MM
Type: IMPLANTABLE DEVICE | Site: FOOT | Status: NON-FUNCTIONAL
Brand: BABY GORILLA/GORILLA PLATING SYSTEM
Removed: 2024-12-03

## (undated) DEVICE — TRAP FLD MINIVAC MEGADYNE 100ML

## (undated) DEVICE — K-WIRE, SINGLE ENDED TROCAR TIP, SMOOTH, 1.2 X 150MM
Type: IMPLANTABLE DEVICE | Site: FOOT | Status: NON-FUNCTIONAL
Brand: MONSTER SCREW SYSTEM
Removed: 2024-12-03

## (undated) DEVICE — 6 BLANK STERILE LABELS 3/8 X 1 1/2: Brand: CENTURION

## (undated) DEVICE — PRECISION THIN, OFFSET (5.5 X 0.38 X 25.0MM)

## (undated) DEVICE — PAD,ARMBOARD,CONV,FOAM,2X8X20",12PR/CS: Brand: MEDLINE

## (undated) DEVICE — P28, K-WIRE, 1.6 X 150 MM, SINGLE TROCAR, SMOOTH, SS
Type: IMPLANTABLE DEVICE | Site: FOOT | Status: NON-FUNCTIONAL
Brand: MULTI SYSTEM
Removed: 2024-12-03

## (undated) DEVICE — MAX-CORE® DISPOSABLE CORE BIOPSY INSTRUMENT, 18G X 25CM: Brand: MAX-CORE

## (undated) DEVICE — PENCL SMOKE/EVAC MEGADYNE TELESCP 10FT

## (undated) DEVICE — APPL CHLORAPREP TINTED 26ML TEAL

## (undated) DEVICE — DRSNG GZ CURAD XEROFORM PETROLTM OVERWRP 1X8IN STRL

## (undated) DEVICE — K-WIRE, SINGLE ENDED TROCAR TIP, SMOOTH, 2.3 X 150MM
Type: IMPLANTABLE DEVICE | Site: FOOT | Status: NON-FUNCTIONAL
Brand: MULTI SYSTEM
Removed: 2024-12-03